# Patient Record
Sex: FEMALE | Race: WHITE | Employment: UNEMPLOYED | URBAN - METROPOLITAN AREA
[De-identification: names, ages, dates, MRNs, and addresses within clinical notes are randomized per-mention and may not be internally consistent; named-entity substitution may affect disease eponyms.]

---

## 2017-01-31 PROBLEM — G89.29 CHRONIC PAIN: Chronic | Status: ACTIVE | Noted: 2017-01-31

## 2017-01-31 PROBLEM — M48.061 NEURAL FORAMINAL STENOSIS OF LUMBAR SPINE: Chronic | Status: ACTIVE | Noted: 2017-01-31

## 2017-01-31 PROBLEM — M96.1 LUMBAR POST-LAMINECTOMY SYNDROME: Chronic | Status: ACTIVE | Noted: 2017-01-31

## 2018-02-16 PROBLEM — Z90.89 S/P PARATHYROIDECTOMY: Status: ACTIVE | Noted: 2018-02-16

## 2018-02-16 PROBLEM — N18.30 STAGE 3 CHRONIC KIDNEY DISEASE (HCC): Status: ACTIVE | Noted: 2018-02-16

## 2018-02-16 PROBLEM — G47.00 INSOMNIA: Status: ACTIVE | Noted: 2018-02-16

## 2018-02-16 PROBLEM — E89.2 S/P PARATHYROIDECTOMY (HCC): Status: ACTIVE | Noted: 2018-02-16

## 2018-02-16 PROBLEM — Z98.890 S/P PARATHYROIDECTOMY: Status: ACTIVE | Noted: 2018-02-16

## 2018-04-12 ENCOUNTER — HOSPITAL ENCOUNTER (OUTPATIENT)
Age: 53
Discharge: HOME | End: 2018-04-14
Payer: COMMERCIAL

## 2018-04-12 PROCEDURE — G0480 DRUG TEST DEF 1-7 CLASSES: HCPCS

## 2018-04-12 PROCEDURE — 80307 DRUG TEST PRSMV CHEM ANLYZR: CPT

## 2018-04-13 LAB
AMPHETAMINE SCREEN, URINE: NOT DETECTED
BARBITURATE SCREEN URINE: NOT DETECTED
BENZODIAZEPINE SCREEN, URINE: NOT DETECTED
CANNABINOID SCREEN URINE: NOT DETECTED
COCAINE METABOLITE SCREEN URINE: NOT DETECTED
METHADONE SCREEN, URINE: NOT DETECTED
OPIATE SCREEN URINE: NOT DETECTED
PHENCYCLIDINE SCREEN URINE: NOT DETECTED
PROPOXYPHENE SCREEN: NOT DETECTED

## 2018-04-16 LAB
6AM URINE: <10 NG/ML
CODEINE, URINE: <20 NG/ML
HYDROCODONE, URINE: <20 NG/ML
HYDROMORPHONE, URINE: <20 NG/ML
MORPHINE URINE: <20 NG/ML
NORHYDROCODONE, URINE: <20 NG/ML
NOROXYCODONE, URINE: <20 NG/ML
NOROXYMORPHONE, URINE: <20 NG/ML
O-DESMETHYLTRAMADOL,UR: POSITIVE
OXYCODONE, URINE CONFIRMATION: <20 NG/ML
OXYMORPHONE, URINE: <20 NG/ML
TRAMADOL, URINE: >5000 NG/ML

## 2018-04-30 ENCOUNTER — HOSPITAL ENCOUNTER (OUTPATIENT)
Age: 53
Discharge: HOME | End: 2018-04-30
Payer: COMMERCIAL

## 2018-04-30 LAB
CALCIUM IONIZED: 1.32 MMOL/L (ref 1.15–1.33)
CALCIUM SERPL-MCNC: 9.5 MG/DL (ref 8.6–10.2)
PARATHYROID HORMONE INTACT: 60 PG/ML (ref 15–65)

## 2018-04-30 PROCEDURE — 36415 COLL VENOUS BLD VENIPUNCTURE: CPT

## 2018-04-30 PROCEDURE — 82330 ASSAY OF CALCIUM: CPT

## 2018-04-30 PROCEDURE — 83970 ASSAY OF PARATHORMONE: CPT

## 2018-04-30 PROCEDURE — 82310 ASSAY OF CALCIUM: CPT

## 2018-06-19 ENCOUNTER — OFFICE VISIT (OUTPATIENT)
Dept: FAMILY MEDICINE CLINIC | Age: 53
End: 2018-06-19
Payer: COMMERCIAL

## 2018-06-19 ENCOUNTER — HOSPITAL ENCOUNTER (OUTPATIENT)
Dept: GENERAL RADIOLOGY | Age: 53
Discharge: HOME | End: 2018-06-21
Payer: COMMERCIAL

## 2018-06-19 ENCOUNTER — HOSPITAL ENCOUNTER (OUTPATIENT)
Age: 53
Discharge: HOME | End: 2018-06-21
Payer: COMMERCIAL

## 2018-06-19 VITALS
HEIGHT: 61 IN | DIASTOLIC BLOOD PRESSURE: 78 MMHG | HEART RATE: 64 BPM | OXYGEN SATURATION: 99 % | SYSTOLIC BLOOD PRESSURE: 122 MMHG | BODY MASS INDEX: 25.86 KG/M2 | WEIGHT: 137 LBS

## 2018-06-19 DIAGNOSIS — S16.1XXA STRAIN OF CERVICAL PORTION OF LEFT TRAPEZIUS MUSCLE: ICD-10-CM

## 2018-06-19 DIAGNOSIS — S69.92XA INJURY OF LEFT MIDDLE FINGER, INITIAL ENCOUNTER: Primary | ICD-10-CM

## 2018-06-19 PROCEDURE — G8417 CALC BMI ABV UP PARAM F/U: HCPCS | Performed by: FAMILY MEDICINE

## 2018-06-19 PROCEDURE — 73000 X-RAY EXAM OF COLLAR BONE: CPT

## 2018-06-19 PROCEDURE — G8427 DOCREV CUR MEDS BY ELIG CLIN: HCPCS | Performed by: FAMILY MEDICINE

## 2018-06-19 PROCEDURE — 3017F COLORECTAL CA SCREEN DOC REV: CPT | Performed by: FAMILY MEDICINE

## 2018-06-19 PROCEDURE — 99214 OFFICE O/P EST MOD 30 MIN: CPT | Performed by: FAMILY MEDICINE

## 2018-06-19 PROCEDURE — 4004F PT TOBACCO SCREEN RCVD TLK: CPT | Performed by: FAMILY MEDICINE

## 2018-06-19 RX ORDER — TRAMADOL HYDROCHLORIDE 50 MG/1
TABLET ORAL
Refills: 5 | COMMUNITY
Start: 2018-05-23 | End: 2019-02-19 | Stop reason: ALTCHOICE

## 2018-06-19 RX ORDER — TIZANIDINE 4 MG/1
4 TABLET ORAL 3 TIMES DAILY
Qty: 30 TABLET | Refills: 0 | Status: SHIPPED | OUTPATIENT
Start: 2018-06-19 | End: 2018-08-16 | Stop reason: SDUPTHER

## 2018-06-19 RX ORDER — GABAPENTIN 600 MG/1
TABLET ORAL
Refills: 2 | COMMUNITY
Start: 2018-03-19 | End: 2018-09-21 | Stop reason: SDUPTHER

## 2018-06-20 ASSESSMENT — ENCOUNTER SYMPTOMS
SORE THROAT: 0
DIARRHEA: 0
ABDOMINAL PAIN: 0
EYE PAIN: 0
SHORTNESS OF BREATH: 0
CONSTIPATION: 0
BACK PAIN: 1
COUGH: 0
SINUS PRESSURE: 0

## 2018-07-20 ENCOUNTER — TELEPHONE (OUTPATIENT)
Dept: ORTHOPEDIC SURGERY | Age: 53
End: 2018-07-20

## 2018-07-20 DIAGNOSIS — R52 PAIN: Primary | ICD-10-CM

## 2018-07-24 ENCOUNTER — OFFICE VISIT (OUTPATIENT)
Dept: ORTHOPEDIC SURGERY | Age: 53
End: 2018-07-24
Payer: COMMERCIAL

## 2018-07-24 VITALS
DIASTOLIC BLOOD PRESSURE: 78 MMHG | SYSTOLIC BLOOD PRESSURE: 116 MMHG | BODY MASS INDEX: 25.52 KG/M2 | WEIGHT: 130 LBS | HEART RATE: 61 BPM | TEMPERATURE: 98 F | RESPIRATION RATE: 18 BRPM | HEIGHT: 60 IN

## 2018-07-24 DIAGNOSIS — S63.413S: Primary | ICD-10-CM

## 2018-07-24 PROCEDURE — 3017F COLORECTAL CA SCREEN DOC REV: CPT | Performed by: ORTHOPAEDIC SURGERY

## 2018-07-24 PROCEDURE — G8417 CALC BMI ABV UP PARAM F/U: HCPCS | Performed by: ORTHOPAEDIC SURGERY

## 2018-07-24 PROCEDURE — 99202 OFFICE O/P NEW SF 15 MIN: CPT | Performed by: ORTHOPAEDIC SURGERY

## 2018-07-24 PROCEDURE — 4004F PT TOBACCO SCREEN RCVD TLK: CPT | Performed by: ORTHOPAEDIC SURGERY

## 2018-07-24 PROCEDURE — G8427 DOCREV CUR MEDS BY ELIG CLIN: HCPCS | Performed by: ORTHOPAEDIC SURGERY

## 2018-07-24 NOTE — PROGRESS NOTES
Department of Orthopedic Surgery  [facility] Shameka Frances MD  History and Physical      CHIEF COMPLAINT:  Left middle finger injury    HISTORY OF PRESENT ILLNESS:                The patient is a 47 y.o. female who presents with Left middle finger injury. Back in December nearly 7 months ago she reports a windowpane came down on her left middle finger. She had immediate pain and deformity. Self treated this. She felt that it may have been dislocated or fractured and tried to self reduce it. She reports she pulled on it and manipulated it. However she still had persistent pain over the radial aspect of the PIP joint and the left middle finger. She is not had any x-rays. She is not had any other treatments. No splinting or bracing and no therapy. No other injuries reported. She is left-hand dominant. The finger does bother her. She is concerned that things are not improving seven months post injury. Past Medical History:        Diagnosis Date    Chronic back pain     Chronic kidney disease      Past Surgical History:        Procedure Laterality Date    CARPAL TUNNEL RELEASE      two carpal tunnel surgeries    CHOLECYSTECTOMY      GALLBLADDER SURGERY      LUMBAR FUSION  2/1/2013    PLIF  L5 - S1    NOSE SURGERY      four nose surgeries    PARATHYROIDECTOMY Right 02/09/2018    ROTATOR CUFF REPAIR      SPINE SURGERY       Current Medications:   No current facility-administered medications for this visit. Allergies:  Pcn [penicillins]    Social History:   TOBACCO:   reports that she has been smoking Cigarettes. She has been smoking about 0.50 packs per day. She has never used smokeless tobacco.  ETOH:   reports that she does not drink alcohol. DRUGS:   reports that she does not use drugs.   ACTIVITIES OF DAILY LIVING:    OCCUPATION:    Family History:   Family History   Problem Relation Age of Onset    Heart Disease Mother     Arthritis Mother     Diabetes Mother     High Blood Pressure Mother     Kidney Disease Mother     Heart Disease Father     High Blood Pressure Father     Arthritis Father     Stroke Father     Diabetes Sister     Arthritis Maternal Uncle     Diabetes Maternal Uncle     Kidney Disease Maternal Uncle     Arthritis Maternal Grandmother        REVIEW OF SYSTEMS:  CONSTITUTIONAL:  negative  EYES:  negative  HEENT:  negative  RESPIRATORY:  negative  CARDIOVASCULAR:  negative  GASTROINTESTINAL:  negative  GENITOURINARY:  CKD  INTEGUMENT/BREAST:  negative  HEMATOLOGIC/LYMPHATIC:  negative  ALLERGIC/IMMUNOLOGIC:  negative  ENDOCRINE:  negative  MUSCULOSKELETAL:  Back pain  NEUROLOGICAL:  negative  BEHAVIOR/PSYCH:  negative    PHYSICAL EXAM:    VITALS:  /78 (Site: Left Arm, Position: Sitting)   Pulse 61   Temp 98 °F (36.7 °C) (Oral)   Resp 18   Ht 5' (1.524 m)   Wt 130 lb (59 kg)   LMP 04/15/2015   BMI 25.39 kg/m²   CONSTITUTIONAL:  awake, alert, cooperative, no apparent distress, and appears stated age  EYES:  Lids and lashes normal, pupils equal, round and reactive to light, extra ocular muscles intact, sclera clear, conjunctiva normal  ENT:  Normocephalic, without obvious abnormality, atraumatic, sinuses nontender on palpation, external ears without lesions, oral pharynx with moist mucus membranes, tonsils without erythema or exudates, gums normal and good dentition. NECK:  Supple, symmetrical, trachea midline, no adenopathy, thyroid symmetric, not enlarged and no tenderness, skin normal  NEUROLOGIC:  Awake, alert, oriented to name, place and time. Cranial nerves II-XII are grossly intact. Motor is 5 out of 5 bilaterally. Sensory is intact.  gait is normal.  MUSCULOSKELETAL:    Left upper extremity: Nontender about the shoulder elbow and wrist region. Negative Tinel's at cubital and carpal tunnels. Negative tenderness or triggering over the thumb index middle ring or small finger A1 pulleys. Radial, ulnar, median nerves are grossly intact to light touch.  Motor testing 5/5 strength extremity. Middle finger with swelling over the radial aspect of PIP joint. Positive tenderness palpation of the radial collateral ligament. Full MCP PIP and DIP joint extension. Full composite flexion to the middle finger and PIP joints. Radial collateral ligament stress testing stable in both full extension and 35° of flexion. No instability appreciated. FDP, FDS, and extensor function is intact. Brisk capillary refill to the digit. DATA:    CBC:   Lab Results   Component Value Date    WBC 5.8 01/17/2018    RBC 5.19 01/17/2018    HGB 13.8 01/17/2018    HCT 43.1 01/17/2018    MCV 83.0 01/17/2018    MCH 26.6 01/17/2018    MCHC 32.0 01/17/2018    RDW 13.8 01/17/2018     01/17/2018    MPV 13.6 01/17/2018     PT/INR:    Lab Results   Component Value Date    PROTIME 10.5 04/14/2015    PROTIME 11.9 12/01/2010    INR 1.0 04/14/2015       Radiology Review:  X-rays left hand focused on the middle finger AP, lateral, obliques were obtained today in the office: Negative for any acute fracture dislocations. The middle finger PIP joint is well reduced and symmetric. Soft tissues within normal limits. Impression and in office x-rays:Left hand and middle finger x-rays negative for any acute fractures dislocations    IMPRESSION:  · Left middle finger radial collateral ligament injury high degree sprain without instability seven months post injury    PLAN:  Today's findings were explained to the patient. X-rays were reviewed with her. Options were explained. She reports the finger is bothersome to her. I recommended giving it more time. If symptoms persist discussed surgical intervention to explore the joint with debridement and radial collateral ligament repair as needed. She reports her symptoms are not severe enough to proceed with surgery at this time.  She may follow up at her discretion

## 2018-08-14 ENCOUNTER — HOSPITAL ENCOUNTER (OUTPATIENT)
Age: 53
Discharge: HOME | End: 2018-08-14
Payer: COMMERCIAL

## 2018-08-14 LAB
ALBUMIN SERPL-MCNC: 4.3 G/DL (ref 3.5–5.2)
ALP BLD-CCNC: 59 U/L (ref 35–104)
ALT SERPL-CCNC: 10 U/L (ref 0–32)
ANION GAP SERPL CALCULATED.3IONS-SCNC: 10 MMOL/L (ref 7–16)
AST SERPL-CCNC: 14 U/L (ref 0–31)
BASOPHILS ABSOLUTE: 0.07 E9/L (ref 0–0.2)
BASOPHILS RELATIVE PERCENT: 1.3 % (ref 0–2)
BILIRUB SERPL-MCNC: 0.7 MG/DL (ref 0–1.2)
BILIRUBIN URINE: NEGATIVE
BLOOD, URINE: NEGATIVE
BUN BLDV-MCNC: 17 MG/DL (ref 6–20)
CALCIUM SERPL-MCNC: 9.4 MG/DL (ref 8.6–10.2)
CHLORIDE BLD-SCNC: 106 MMOL/L (ref 98–107)
CLARITY: CLEAR
CO2: 27 MMOL/L (ref 22–29)
COLOR: YELLOW
CREAT SERPL-MCNC: 1.3 MG/DL (ref 0.5–1)
EOSINOPHILS ABSOLUTE: 0.39 E9/L (ref 0.05–0.5)
EOSINOPHILS RELATIVE PERCENT: 7.1 % (ref 0–6)
GFR AFRICAN AMERICAN: 52
GFR NON-AFRICAN AMERICAN: 43 ML/MIN/1.73
GLUCOSE BLD-MCNC: 107 MG/DL (ref 74–109)
GLUCOSE URINE: NEGATIVE MG/DL
HCT VFR BLD CALC: 42.8 % (ref 34–48)
HEMOGLOBIN: 14.5 G/DL (ref 11.5–15.5)
IMMATURE GRANULOCYTES #: 0.01 E9/L
IMMATURE GRANULOCYTES %: 0.2 % (ref 0–5)
KETONES, URINE: NEGATIVE MG/DL
LEUKOCYTE ESTERASE, URINE: NEGATIVE
LYMPHOCYTES ABSOLUTE: 1.69 E9/L (ref 1.5–4)
LYMPHOCYTES RELATIVE PERCENT: 30.8 % (ref 20–42)
MCH RBC QN AUTO: 28.4 PG (ref 26–35)
MCHC RBC AUTO-ENTMCNC: 33.9 % (ref 32–34.5)
MCV RBC AUTO: 83.9 FL (ref 80–99.9)
MONOCYTES ABSOLUTE: 0.41 E9/L (ref 0.1–0.95)
MONOCYTES RELATIVE PERCENT: 7.5 % (ref 2–12)
NEUTROPHILS ABSOLUTE: 2.92 E9/L (ref 1.8–7.3)
NEUTROPHILS RELATIVE PERCENT: 53.1 % (ref 43–80)
NITRITE, URINE: NEGATIVE
PDW BLD-RTO: 12.7 FL (ref 11.5–15)
PH UA: 6 (ref 5–9)
PLATELET # BLD: 143 E9/L (ref 130–450)
PMV BLD AUTO: 11.8 FL (ref 7–12)
POTASSIUM SERPL-SCNC: 4 MMOL/L (ref 3.5–5)
PROTEIN PROTEIN: 7 MG/DL (ref 0–12)
PROTEIN UA: NEGATIVE MG/DL
RBC # BLD: 5.1 E12/L (ref 3.5–5.5)
SODIUM BLD-SCNC: 143 MMOL/L (ref 132–146)
SPECIFIC GRAVITY UA: 1.02 (ref 1–1.03)
TOTAL PROTEIN: 7.1 G/DL (ref 6.4–8.3)
UROBILINOGEN, URINE: 0.2 E.U./DL
WBC # BLD: 5.5 E9/L (ref 4.5–11.5)

## 2018-08-14 PROCEDURE — 80053 COMPREHEN METABOLIC PANEL: CPT

## 2018-08-14 PROCEDURE — 82570 ASSAY OF URINE CREATININE: CPT

## 2018-08-14 PROCEDURE — 85025 COMPLETE CBC W/AUTO DIFF WBC: CPT

## 2018-08-14 PROCEDURE — 36415 COLL VENOUS BLD VENIPUNCTURE: CPT

## 2018-08-14 PROCEDURE — 84156 ASSAY OF PROTEIN URINE: CPT

## 2018-08-14 PROCEDURE — 81003 URINALYSIS AUTO W/O SCOPE: CPT

## 2018-08-14 PROCEDURE — 82044 UR ALBUMIN SEMIQUANTITATIVE: CPT

## 2018-08-15 LAB
CREATININE URINE: 135 MG/DL (ref 29–226)
MICROALBUMIN UR-MCNC: <12 MG/L
MICROALBUMIN/CREAT UR-RTO: ABNORMAL (ref 0–30)

## 2018-08-16 ENCOUNTER — OFFICE VISIT (OUTPATIENT)
Dept: FAMILY MEDICINE CLINIC | Age: 53
End: 2018-08-16
Payer: COMMERCIAL

## 2018-08-16 ENCOUNTER — HOSPITAL ENCOUNTER (OUTPATIENT)
Age: 53
Discharge: HOME | End: 2018-08-18
Payer: COMMERCIAL

## 2018-08-16 VITALS
OXYGEN SATURATION: 99 % | WEIGHT: 137 LBS | HEIGHT: 61 IN | RESPIRATION RATE: 18 BRPM | DIASTOLIC BLOOD PRESSURE: 79 MMHG | BODY MASS INDEX: 25.86 KG/M2 | SYSTOLIC BLOOD PRESSURE: 113 MMHG | HEART RATE: 61 BPM

## 2018-08-16 DIAGNOSIS — M48.061 NEURAL FORAMINAL STENOSIS OF LUMBAR SPINE: Chronic | ICD-10-CM

## 2018-08-16 DIAGNOSIS — S16.1XXA STRAIN OF CERVICAL PORTION OF LEFT TRAPEZIUS MUSCLE: Primary | ICD-10-CM

## 2018-08-16 DIAGNOSIS — M51.36 BULGING LUMBAR DISC: Chronic | ICD-10-CM

## 2018-08-16 DIAGNOSIS — M96.1 LUMBAR POST-LAMINECTOMY SYNDROME: Chronic | ICD-10-CM

## 2018-08-16 DIAGNOSIS — Z12.31 SCREENING MAMMOGRAM, ENCOUNTER FOR: ICD-10-CM

## 2018-08-16 DIAGNOSIS — Z12.11 SCREENING FOR COLON CANCER: ICD-10-CM

## 2018-08-16 DIAGNOSIS — E78.2 MIXED HYPERLIPIDEMIA: ICD-10-CM

## 2018-08-16 LAB
CHOLESTEROL, TOTAL: 204 MG/DL (ref 0–199)
HDLC SERPL-MCNC: 45 MG/DL
LDL CHOLESTEROL CALCULATED: 136 MG/DL (ref 0–99)
TRIGL SERPL-MCNC: 113 MG/DL (ref 0–149)
VLDLC SERPL CALC-MCNC: 23 MG/DL

## 2018-08-16 PROCEDURE — 4004F PT TOBACCO SCREEN RCVD TLK: CPT | Performed by: FAMILY MEDICINE

## 2018-08-16 PROCEDURE — G8417 CALC BMI ABV UP PARAM F/U: HCPCS | Performed by: FAMILY MEDICINE

## 2018-08-16 PROCEDURE — G8427 DOCREV CUR MEDS BY ELIG CLIN: HCPCS | Performed by: FAMILY MEDICINE

## 2018-08-16 PROCEDURE — 99214 OFFICE O/P EST MOD 30 MIN: CPT | Performed by: FAMILY MEDICINE

## 2018-08-16 PROCEDURE — 80061 LIPID PANEL: CPT

## 2018-08-16 PROCEDURE — 3017F COLORECTAL CA SCREEN DOC REV: CPT | Performed by: FAMILY MEDICINE

## 2018-08-16 RX ORDER — TIZANIDINE 4 MG/1
4 TABLET ORAL 3 TIMES DAILY PRN
Qty: 60 TABLET | Refills: 0 | Status: SHIPPED | OUTPATIENT
Start: 2018-08-16 | End: 2018-10-09 | Stop reason: ALTCHOICE

## 2018-08-16 RX ORDER — OXCARBAZEPINE 150 MG/1
TABLET, FILM COATED ORAL
Qty: 60 TABLET | Refills: 2 | COMMUNITY
Start: 2018-08-16 | End: 2018-10-09 | Stop reason: ALTCHOICE

## 2018-08-16 NOTE — PROGRESS NOTES
Olivia Salcedo  : 1965    Chief Complaint:     Chief Complaint   Patient presents with    Annual Exam       HPI  Olivia Salcedo 47 y.o. presents for   Chief Complaint   Patient presents with    Annual Exam     sprain trapezius/bulging lumbar disc/lumbar postlaminectomy/stenosis of lumbar spine  Patient follows with a different specialist for her pain. She does follow with Dr. Acacia Goldberg who does give her tramadol. However she also follows with a pain specialist located in [place]. She is on multiple different medications for her pain. I did advise her that I think it would be best if she got all her medications from one person. She does wish to eliminate some of her medications and consolidate. She continues to complain of pain though this is somewhat controlled with her current therapy. Lipidemia  She is not on medication currently. She does watch her diet. Her risk score is listed as below. The 10-year ASCVD risk score (Jose Jaime, et al., 2013) is: 3.8%    Values used to calculate the score:      Age: 47 years      Sex: Female      Is Non- : No      Diabetic: No      Tobacco smoker: Yes      Systolic Blood Pressure: 810 mmHg      Is BP treated: No      HDL Cholesterol: 45 mg/dL      Total Cholesterol: 204 mg/dL      All questions were answered to patients satisfaction.     Past Medical History:   Diagnosis Date    Chronic back pain     Chronic kidney disease        Past Surgical History:   Procedure Laterality Date    CARPAL TUNNEL RELEASE      two carpal tunnel surgeries    CHOLECYSTECTOMY      GALLBLADDER SURGERY      LUMBAR FUSION  2013    PLIF  L5 - S1    NOSE SURGERY      four nose surgeries    PARATHYROIDECTOMY Right 2018    ROTATOR CUFF REPAIR      SPINE SURGERY         Social History     Social History    Marital status:      Spouse name: N/A    Number of children: N/A    Years of education: N/A     Social History Main Topics diarrhea. Genitourinary: Negative for dysuria and urgency. Musculoskeletal: Positive for back pain. Negative for myalgias. Finger issue, left shoulder pain   Skin: Negative for rash. Allergic/Immunologic: Negative for food allergies. Neurological: Negative for light-headedness and headaches. Hematological: Does not bruise/bleed easily. Psychiatric/Behavioral: Negative for behavioral problems and sleep disturbance. PHYSICAL EXAM  /79 (Site: Right Arm)   Pulse 61   Resp 18   Ht 5' 1\" (1.549 m)   Wt 137 lb (62.1 kg)   LMP 04/15/2015   SpO2 99%   BMI 25.89 kg/m²   Physical Exam   Constitutional: She is oriented to person, place, and time. She appears well-developed and well-nourished. HENT:   Head: Normocephalic and atraumatic. Right Ear: External ear normal.   Left Ear: External ear normal.   Nose: Nose normal.   Mouth/Throat: Oropharynx is clear and moist.   Eyes: Pupils are equal, round, and reactive to light. Conjunctivae and EOM are normal.   Neck: Normal range of motion. Neck supple. No thyromegaly present. Cardiovascular: Normal rate, regular rhythm and normal heart sounds. Exam reveals no gallop and no friction rub. No murmur heard. Pulmonary/Chest: Effort normal and breath sounds normal. She has no wheezes. Abdominal: Soft. She exhibits no mass. There is no tenderness. There is no rebound and no guarding. Musculoskeletal: Normal range of motion. She exhibits no edema or tenderness. Lymphadenopathy:     She has no cervical adenopathy. Neurological: She is alert and oriented to person, place, and time. She has normal reflexes. Skin: Skin is warm and dry. No rash noted. Incision on anterior neck is clean dry and intact and slightly elevated. Hematoma present. Psychiatric: She has a normal mood and affect. Her behavior is normal.   Nursing note and vitals reviewed. Laboratory:   All laboratory and radiology results have been personally reviewed reviewed today and counseled as appropriate    Call or go to ED immediately if symptoms worsen or persist.  Future Appointments  Date Time Provider Jennifer Cyn   10/9/2018 10:00 AM MD LIOR Hernandez   2/19/2019 10:00 AM DO Rosas Matson [facility]     Or sooner if necessary. Educational materials and/or home exercises printed for patient's review and were included in patient instructions on his/her After Visit Summary and given to patient at the end of visit.       Counseled regarding above diagnosis, including possible risks and complications,  especially if left uncontrolled.     Counseled regarding the possible side effects, risks, benefits and alternatives to treatment; patient and/or guardian verbalizes understanding, agrees, feels comfortable with and wishes to proceed with above treatment plan.     Advised patient to call with any new medication issues, and read all Rx info from pharmacy to assure aware of all possible risks and side effects of medication before taking.     Reviewed age and gender appropriate health screening exams and vaccinations. Advised patient regarding importance of keeping up with recommended health maintenance and to schedule as soon as possible if overdue, as this is important in assessing for undiagnosed pathology, especially cancer, as well as protecting against potentially harmful/life threatening disease.          Patient and/or guardian verbalizes understanding and agrees with above counseling, assessment and plan.     All questions answered. Farooq Valenzuela, [address], DO  8/16/18    NOTE: This report was transcribed using voice recognition software.  Every effort was made to ensure accuracy; however, inadvertent computerized transcription errors may be present

## 2018-08-17 ASSESSMENT — ENCOUNTER SYMPTOMS
SORE THROAT: 0
COUGH: 0
EYE PAIN: 0
SHORTNESS OF BREATH: 0
BACK PAIN: 1
DIARRHEA: 0
ABDOMINAL PAIN: 0
SINUS PRESSURE: 0
CONSTIPATION: 0

## 2018-08-30 ENCOUNTER — HOSPITAL ENCOUNTER (OUTPATIENT)
Dept: GENERAL RADIOLOGY | Age: 53
Discharge: HOME | End: 2018-09-01
Payer: COMMERCIAL

## 2018-08-30 DIAGNOSIS — Z12.31 SCREENING MAMMOGRAM, ENCOUNTER FOR: ICD-10-CM

## 2018-08-30 PROCEDURE — 77067 SCR MAMMO BI INCL CAD: CPT

## 2019-02-19 ENCOUNTER — OFFICE VISIT (OUTPATIENT)
Dept: FAMILY MEDICINE CLINIC | Age: 54
End: 2019-02-19
Payer: COMMERCIAL

## 2019-02-19 VITALS
HEIGHT: 60 IN | DIASTOLIC BLOOD PRESSURE: 70 MMHG | BODY MASS INDEX: 28.27 KG/M2 | TEMPERATURE: 97.4 F | OXYGEN SATURATION: 98 % | HEART RATE: 68 BPM | SYSTOLIC BLOOD PRESSURE: 110 MMHG | WEIGHT: 144 LBS

## 2019-02-19 DIAGNOSIS — Z12.11 SCREENING FOR COLON CANCER: ICD-10-CM

## 2019-02-19 DIAGNOSIS — G47.00 INSOMNIA, UNSPECIFIED TYPE: ICD-10-CM

## 2019-02-19 DIAGNOSIS — E78.5 HYPERLIPIDEMIA, UNSPECIFIED HYPERLIPIDEMIA TYPE: ICD-10-CM

## 2019-02-19 DIAGNOSIS — M96.1 LUMBAR POST-LAMINECTOMY SYNDROME: Chronic | ICD-10-CM

## 2019-02-19 DIAGNOSIS — N18.30 STAGE 3 CHRONIC KIDNEY DISEASE (HCC): Primary | ICD-10-CM

## 2019-02-19 PROCEDURE — G8427 DOCREV CUR MEDS BY ELIG CLIN: HCPCS | Performed by: FAMILY MEDICINE

## 2019-02-19 PROCEDURE — 3017F COLORECTAL CA SCREEN DOC REV: CPT | Performed by: FAMILY MEDICINE

## 2019-02-19 PROCEDURE — 4004F PT TOBACCO SCREEN RCVD TLK: CPT | Performed by: FAMILY MEDICINE

## 2019-02-19 PROCEDURE — 99214 OFFICE O/P EST MOD 30 MIN: CPT | Performed by: FAMILY MEDICINE

## 2019-02-19 PROCEDURE — G8484 FLU IMMUNIZE NO ADMIN: HCPCS | Performed by: FAMILY MEDICINE

## 2019-02-19 PROCEDURE — G8417 CALC BMI ABV UP PARAM F/U: HCPCS | Performed by: FAMILY MEDICINE

## 2019-02-19 RX ORDER — METHADONE HYDROCHLORIDE 5 MG/1
TABLET ORAL
Refills: 0 | COMMUNITY
Start: 2018-11-30 | End: 2020-01-30 | Stop reason: ALTCHOICE

## 2019-02-19 RX ORDER — TRAZODONE HYDROCHLORIDE 150 MG/1
TABLET ORAL
Qty: 90 TABLET | Refills: 1 | Status: SHIPPED | OUTPATIENT
Start: 2019-02-19 | End: 2019-08-14 | Stop reason: SDUPTHER

## 2019-02-19 RX ORDER — GABAPENTIN 600 MG/1
TABLET ORAL
Qty: 360 TABLET | Refills: 1 | Status: SHIPPED | OUTPATIENT
Start: 2019-02-19 | End: 2019-08-14 | Stop reason: SDUPTHER

## 2019-02-19 ASSESSMENT — ENCOUNTER SYMPTOMS
DIARRHEA: 0
EYE PAIN: 0
COUGH: 0
SINUS PRESSURE: 0
SHORTNESS OF BREATH: 0
SORE THROAT: 0
CONSTIPATION: 0
ABDOMINAL PAIN: 0
BACK PAIN: 0

## 2019-02-19 ASSESSMENT — PATIENT HEALTH QUESTIONNAIRE - PHQ9
1. LITTLE INTEREST OR PLEASURE IN DOING THINGS: 0
SUM OF ALL RESPONSES TO PHQ9 QUESTIONS 1 & 2: 0
2. FEELING DOWN, DEPRESSED OR HOPELESS: 0
SUM OF ALL RESPONSES TO PHQ QUESTIONS 1-9: 0
SUM OF ALL RESPONSES TO PHQ QUESTIONS 1-9: 0

## 2019-03-05 ENCOUNTER — TELEPHONE (OUTPATIENT)
Dept: FAMILY MEDICINE CLINIC | Age: 54
End: 2019-03-05

## 2019-03-05 DIAGNOSIS — Z12.11 SCREENING FOR COLON CANCER: ICD-10-CM

## 2019-03-05 LAB
CONTROL: POSITIVE
HEMOCCULT STL QL: NEGATIVE

## 2019-03-05 PROCEDURE — 82274 ASSAY TEST FOR BLOOD FECAL: CPT | Performed by: FAMILY MEDICINE

## 2019-06-12 ENCOUNTER — HOSPITAL ENCOUNTER (OUTPATIENT)
Age: 54
Discharge: HOME | End: 2019-06-12
Payer: COMMERCIAL

## 2019-06-12 LAB
ALBUMIN SERPL-MCNC: 4.5 G/DL (ref 3.5–5.2)
ALP BLD-CCNC: 65 U/L (ref 35–104)
ALT SERPL-CCNC: 9 U/L (ref 0–32)
ANION GAP SERPL CALCULATED.3IONS-SCNC: 9 MMOL/L (ref 7–16)
AST SERPL-CCNC: 16 U/L (ref 0–31)
BILIRUB SERPL-MCNC: 0.6 MG/DL (ref 0–1.2)
BUN BLDV-MCNC: 15 MG/DL (ref 6–20)
CALCIUM SERPL-MCNC: 9.7 MG/DL (ref 8.6–10.2)
CHLORIDE BLD-SCNC: 105 MMOL/L (ref 98–107)
CO2: 28 MMOL/L (ref 22–29)
CREAT SERPL-MCNC: 1.3 MG/DL (ref 0.5–1)
GFR AFRICAN AMERICAN: 52
GFR NON-AFRICAN AMERICAN: 43 ML/MIN/1.73
GLUCOSE FASTING: 101 MG/DL (ref 74–99)
POTASSIUM SERPL-SCNC: 4.2 MMOL/L (ref 3.5–5)
SODIUM BLD-SCNC: 142 MMOL/L (ref 132–146)
TOTAL PROTEIN: 7.3 G/DL (ref 6.4–8.3)

## 2019-06-12 PROCEDURE — 80053 COMPREHEN METABOLIC PANEL: CPT

## 2019-06-12 PROCEDURE — 36415 COLL VENOUS BLD VENIPUNCTURE: CPT

## 2019-08-14 ENCOUNTER — OFFICE VISIT (OUTPATIENT)
Dept: FAMILY MEDICINE CLINIC | Age: 54
End: 2019-08-14
Payer: COMMERCIAL

## 2019-08-14 VITALS
BODY MASS INDEX: 27.41 KG/M2 | HEART RATE: 60 BPM | DIASTOLIC BLOOD PRESSURE: 71 MMHG | OXYGEN SATURATION: 98 % | SYSTOLIC BLOOD PRESSURE: 113 MMHG | RESPIRATION RATE: 18 BRPM | HEIGHT: 60 IN | TEMPERATURE: 98.3 F | WEIGHT: 139.6 LBS

## 2019-08-14 DIAGNOSIS — E78.5 HYPERLIPIDEMIA, UNSPECIFIED HYPERLIPIDEMIA TYPE: ICD-10-CM

## 2019-08-14 DIAGNOSIS — Z12.31 SCREENING MAMMOGRAM, ENCOUNTER FOR: ICD-10-CM

## 2019-08-14 DIAGNOSIS — M48.061 NEURAL FORAMINAL STENOSIS OF LUMBAR SPINE: ICD-10-CM

## 2019-08-14 DIAGNOSIS — N18.30 STAGE 3 CHRONIC KIDNEY DISEASE (HCC): Primary | ICD-10-CM

## 2019-08-14 DIAGNOSIS — G47.00 INSOMNIA, UNSPECIFIED TYPE: ICD-10-CM

## 2019-08-14 PROCEDURE — G8427 DOCREV CUR MEDS BY ELIG CLIN: HCPCS | Performed by: FAMILY MEDICINE

## 2019-08-14 PROCEDURE — 4004F PT TOBACCO SCREEN RCVD TLK: CPT | Performed by: FAMILY MEDICINE

## 2019-08-14 PROCEDURE — G8417 CALC BMI ABV UP PARAM F/U: HCPCS | Performed by: FAMILY MEDICINE

## 2019-08-14 PROCEDURE — 3017F COLORECTAL CA SCREEN DOC REV: CPT | Performed by: FAMILY MEDICINE

## 2019-08-14 PROCEDURE — 99214 OFFICE O/P EST MOD 30 MIN: CPT | Performed by: FAMILY MEDICINE

## 2019-08-14 RX ORDER — TRAZODONE HYDROCHLORIDE 150 MG/1
TABLET ORAL
Qty: 90 TABLET | Refills: 1 | Status: SHIPPED
Start: 2019-08-14 | End: 2020-02-14 | Stop reason: SDUPTHER

## 2019-08-14 RX ORDER — GABAPENTIN 600 MG/1
TABLET ORAL
Qty: 180 TABLET | Refills: 1 | Status: SHIPPED
Start: 2019-08-14 | End: 2020-02-14 | Stop reason: SDUPTHER

## 2019-08-14 ASSESSMENT — ENCOUNTER SYMPTOMS
DIARRHEA: 0
SORE THROAT: 0
SHORTNESS OF BREATH: 0
COUGH: 0
SINUS PRESSURE: 0
EYE PAIN: 0
CONSTIPATION: 0
ABDOMINAL PAIN: 0
BACK PAIN: 0

## 2019-08-14 NOTE — PROGRESS NOTES
DO Rosas Frances [facility]     Or sooner if necessary. Educational materials and/or homeexercises printed for patient's review and were included in patient instructions on his/her After Visit Summary and given to patient at the end of visit.       Counseled regarding above diagnosis, including possible risks and complications,  especially if left uncontrolled.     Counseled regarding the possible side effects, risks, benefits and alternatives to treatment; patient and/or guardian verbalizes understanding, agrees,feels comfortable with and wishes to proceed with above treatment plan.     Advised patient to call [facility] new medication issues, and read all Rx info from pharmacy to assure aware of all possible risks and side effects of medication before taking.     Reviewed age and gender appropriate health screening exams and vaccinations. Advised patient regarding importance of keeping up with recommended health maintenance and toschedule as soon as possible if overdue, as this is important in assessing for undiagnosed pathology, especially cancer, as well as protecting against potentially harmful/life threatening disease.          Patient and/or guardian verbalizes understanding and agrees with above counseling, assessment and plan.     All questions answered. Yamini Villasenor DO  8/14/19    NOTE: This report was transcribed using voice recognition software.  Every effort was made to ensure accuracy; however, inadvertent computerized transcription errors may be present

## 2019-09-30 ENCOUNTER — HOSPITAL ENCOUNTER (OUTPATIENT)
Age: 54
Discharge: HOME | End: 2019-09-30
Payer: COMMERCIAL

## 2019-09-30 LAB
ANION GAP SERPL CALCULATED.3IONS-SCNC: 8 MMOL/L (ref 7–16)
BACTERIA: ABNORMAL /HPF
BILIRUBIN URINE: NEGATIVE
BLOOD, URINE: NEGATIVE
BUN BLDV-MCNC: 18 MG/DL (ref 6–20)
CALCIUM SERPL-MCNC: 9.5 MG/DL (ref 8.6–10.2)
CHLORIDE BLD-SCNC: 107 MMOL/L (ref 98–107)
CLARITY: CLEAR
CO2: 29 MMOL/L (ref 22–29)
COLOR: YELLOW
CREAT SERPL-MCNC: 1.3 MG/DL (ref 0.5–1)
CREATININE URINE: 115 MG/DL (ref 29–226)
EPITHELIAL CELLS, UA: ABNORMAL /HPF
GFR AFRICAN AMERICAN: 52
GFR NON-AFRICAN AMERICAN: 43 ML/MIN/1.73
GLUCOSE BLD-MCNC: 79 MG/DL (ref 74–99)
GLUCOSE URINE: NEGATIVE MG/DL
KETONES, URINE: NEGATIVE MG/DL
LEUKOCYTE ESTERASE, URINE: ABNORMAL
MICROALBUMIN UR-MCNC: <12 MG/L
MICROALBUMIN/CREAT UR-RTO: ABNORMAL (ref 0–30)
NITRITE, URINE: NEGATIVE
PH UA: 5.5 (ref 5–9)
POTASSIUM SERPL-SCNC: 4.2 MMOL/L (ref 3.5–5)
PROTEIN PROTEIN: 6 MG/DL (ref 0–12)
PROTEIN UA: NEGATIVE MG/DL
RBC UA: ABNORMAL /HPF (ref 0–2)
SODIUM BLD-SCNC: 144 MMOL/L (ref 132–146)
SPECIFIC GRAVITY UA: 1.02 (ref 1–1.03)
UROBILINOGEN, URINE: 0.2 E.U./DL
WBC UA: ABNORMAL /HPF (ref 0–5)

## 2019-09-30 PROCEDURE — 81001 URINALYSIS AUTO W/SCOPE: CPT

## 2019-09-30 PROCEDURE — 82570 ASSAY OF URINE CREATININE: CPT

## 2019-09-30 PROCEDURE — 36415 COLL VENOUS BLD VENIPUNCTURE: CPT

## 2019-09-30 PROCEDURE — 84156 ASSAY OF PROTEIN URINE: CPT

## 2019-09-30 PROCEDURE — 80048 BASIC METABOLIC PNL TOTAL CA: CPT

## 2019-09-30 PROCEDURE — 82044 UR ALBUMIN SEMIQUANTITATIVE: CPT

## 2019-11-14 ENCOUNTER — OFFICE VISIT (OUTPATIENT)
Dept: FAMILY MEDICINE CLINIC | Age: 54
End: 2019-11-14
Payer: COMMERCIAL

## 2019-11-14 VITALS
RESPIRATION RATE: 20 BRPM | DIASTOLIC BLOOD PRESSURE: 80 MMHG | HEART RATE: 79 BPM | BODY MASS INDEX: 27.29 KG/M2 | WEIGHT: 139 LBS | TEMPERATURE: 98.4 F | HEIGHT: 60 IN | SYSTOLIC BLOOD PRESSURE: 114 MMHG

## 2019-11-14 DIAGNOSIS — M25.511 CHRONIC RIGHT SHOULDER PAIN: Primary | ICD-10-CM

## 2019-11-14 DIAGNOSIS — G89.29 CHRONIC RIGHT SHOULDER PAIN: Primary | ICD-10-CM

## 2019-11-14 PROCEDURE — G8417 CALC BMI ABV UP PARAM F/U: HCPCS | Performed by: FAMILY MEDICINE

## 2019-11-14 PROCEDURE — 4004F PT TOBACCO SCREEN RCVD TLK: CPT | Performed by: FAMILY MEDICINE

## 2019-11-14 PROCEDURE — G8427 DOCREV CUR MEDS BY ELIG CLIN: HCPCS | Performed by: FAMILY MEDICINE

## 2019-11-14 PROCEDURE — G8484 FLU IMMUNIZE NO ADMIN: HCPCS | Performed by: FAMILY MEDICINE

## 2019-11-14 PROCEDURE — 3017F COLORECTAL CA SCREEN DOC REV: CPT | Performed by: FAMILY MEDICINE

## 2019-11-14 PROCEDURE — 99213 OFFICE O/P EST LOW 20 MIN: CPT | Performed by: FAMILY MEDICINE

## 2019-11-15 ENCOUNTER — HOSPITAL ENCOUNTER (OUTPATIENT)
Age: 54
Discharge: HOME | End: 2019-11-17
Payer: COMMERCIAL

## 2019-11-15 ENCOUNTER — HOSPITAL ENCOUNTER (OUTPATIENT)
Dept: GENERAL RADIOLOGY | Age: 54
Discharge: HOME | End: 2019-11-17
Payer: COMMERCIAL

## 2019-11-15 DIAGNOSIS — G89.29 CHRONIC RIGHT SHOULDER PAIN: ICD-10-CM

## 2019-11-15 DIAGNOSIS — M25.511 CHRONIC RIGHT SHOULDER PAIN: ICD-10-CM

## 2019-11-15 PROCEDURE — 73030 X-RAY EXAM OF SHOULDER: CPT

## 2019-11-15 ASSESSMENT — ENCOUNTER SYMPTOMS
ABDOMINAL PAIN: 0
SINUS PRESSURE: 0
CONSTIPATION: 0
SORE THROAT: 0
SHORTNESS OF BREATH: 0
COUGH: 0
EYE PAIN: 0
DIARRHEA: 0
BACK PAIN: 0

## 2019-11-27 ENCOUNTER — OFFICE VISIT (OUTPATIENT)
Dept: FAMILY MEDICINE CLINIC | Age: 54
End: 2019-11-27
Payer: COMMERCIAL

## 2019-11-27 VITALS
DIASTOLIC BLOOD PRESSURE: 83 MMHG | HEART RATE: 70 BPM | BODY MASS INDEX: 27.34 KG/M2 | SYSTOLIC BLOOD PRESSURE: 129 MMHG | RESPIRATION RATE: 16 BRPM | WEIGHT: 140 LBS | OXYGEN SATURATION: 100 %

## 2019-11-27 DIAGNOSIS — M19.011 OSTEOARTHRITIS OF RIGHT SHOULDER, UNSPECIFIED OSTEOARTHRITIS TYPE: Primary | ICD-10-CM

## 2019-11-27 PROCEDURE — G8484 FLU IMMUNIZE NO ADMIN: HCPCS | Performed by: FAMILY MEDICINE

## 2019-11-27 PROCEDURE — 3017F COLORECTAL CA SCREEN DOC REV: CPT | Performed by: FAMILY MEDICINE

## 2019-11-27 PROCEDURE — G8417 CALC BMI ABV UP PARAM F/U: HCPCS | Performed by: FAMILY MEDICINE

## 2019-11-27 PROCEDURE — G8427 DOCREV CUR MEDS BY ELIG CLIN: HCPCS | Performed by: FAMILY MEDICINE

## 2019-11-27 PROCEDURE — 4004F PT TOBACCO SCREEN RCVD TLK: CPT | Performed by: FAMILY MEDICINE

## 2019-11-27 PROCEDURE — 99213 OFFICE O/P EST LOW 20 MIN: CPT | Performed by: FAMILY MEDICINE

## 2019-12-03 ASSESSMENT — ENCOUNTER SYMPTOMS
DIARRHEA: 0
BACK PAIN: 0
SHORTNESS OF BREATH: 0
COUGH: 0
SORE THROAT: 0
ABDOMINAL PAIN: 0
SINUS PRESSURE: 0
CONSTIPATION: 0
EYE PAIN: 0

## 2020-01-07 ENCOUNTER — OFFICE VISIT (OUTPATIENT)
Dept: ORTHOPEDIC SURGERY | Age: 55
End: 2020-01-07
Payer: COMMERCIAL

## 2020-01-07 VITALS — HEIGHT: 60 IN | WEIGHT: 130 LBS | BODY MASS INDEX: 25.52 KG/M2

## 2020-01-07 PROCEDURE — G8427 DOCREV CUR MEDS BY ELIG CLIN: HCPCS | Performed by: ORTHOPAEDIC SURGERY

## 2020-01-07 PROCEDURE — 4004F PT TOBACCO SCREEN RCVD TLK: CPT | Performed by: ORTHOPAEDIC SURGERY

## 2020-01-07 PROCEDURE — G8417 CALC BMI ABV UP PARAM F/U: HCPCS | Performed by: ORTHOPAEDIC SURGERY

## 2020-01-07 PROCEDURE — 3017F COLORECTAL CA SCREEN DOC REV: CPT | Performed by: ORTHOPAEDIC SURGERY

## 2020-01-07 PROCEDURE — 99213 OFFICE O/P EST LOW 20 MIN: CPT | Performed by: ORTHOPAEDIC SURGERY

## 2020-01-07 PROCEDURE — G8484 FLU IMMUNIZE NO ADMIN: HCPCS | Performed by: ORTHOPAEDIC SURGERY

## 2020-01-07 NOTE — PROGRESS NOTES
 Number of children: Not on file    Years of education: Not on file    Highest education level: Not on file   Occupational History    Not on file   Social Needs    Financial resource strain: Not on file    Food insecurity:     Worry: Not on file     Inability: Not on file    Transportation needs:     Medical: Not on file     Non-medical: Not on file   Tobacco Use    Smoking status: Current Every Day Smoker     Packs/day: 0.50     Years: 10.00     Pack years: 5.00     Types: Cigarettes    Smokeless tobacco: Never Used    Tobacco comment: quit smoking  11/2012   Substance and Sexual Activity    Alcohol use: No    Drug use: No    Sexual activity: Not on file   Lifestyle    Physical activity:     Days per week: Not on file     Minutes per session: Not on file    Stress: Not on file   Relationships    Social connections:     Talks on phone: Not on file     Gets together: Not on file     Attends Holiness service: Not on file     Active member of club or organization: Not on file     Attends meetings of clubs or organizations: Not on file     Relationship status: Not on file    Intimate partner violence:     Fear of current or ex partner: Not on file     Emotionally abused: Not on file     Physically abused: Not on file     Forced sexual activity: Not on file   Other Topics Concern    Not on file   Social History Narrative    Not on file     Family History   Problem Relation Age of Onset    Heart Disease Mother     Arthritis Mother     Diabetes Mother     High Blood Pressure Mother     Kidney Disease Mother     Heart Disease Father     High Blood Pressure Father     Arthritis Father     Stroke Father     Diabetes Sister     Arthritis Maternal Uncle     Diabetes Maternal Uncle     Kidney Disease Maternal Uncle     Arthritis Maternal Grandmother        REVIEW OF SYSTEMS:     General/Constitution:  (-)weight loss, (-)fever, (-)chills, (-)weakness.    Skin: (-) rash,(-) psoriasis,(-) eczema, (-)skin cancer. Musculoskeletal: (-) fractures,  (-) dislocations,(-) collagen vascular disease, (-) fibromyalgia, (-) multiple sclerosis, (-) muscular dystrophy, (-) RSD,(-) joint pain (-)swelling, (-) joint pain,swelling. Neurologic: (-) epilepsy, (-)seizures,(-) brain tumor,(-) TIA, (-)stroke, (-)headaches, (-)Parkinson disease,(-) memory loss, (-) LOC. Cardiovascular: (-) Chest pain, (-) swelling in legs/feet, (-) SOB, (-) cramping in legs/feet with walking. Respiratory: (-) SOB, (-) Coughing, (-) night sweats. GI: (-) nausea, (-) vomiting, (-) diarrhea, (-) blood in stool, (-) gastric ulcer. Psychiatric: (-) Depression, (-) Anxiety, (-) bipolar disease, (-) Alzheimer's Disease  Allergic/Immunologic: (-) allergies latex, (-) allergies metal, (-) skin sensitivity. Hematlogic: (-) anemia, (-) blood transfusion, (-) DVT/PE, (-) Clotting disorders      Subjective:    Constitution:  Ht 5' (1.524 m)   Wt 130 lb (59 kg)   LMP 04/15/2015   BMI 25.39 kg/m²     Psycihatric:  The patient is alert and oriented x 3, appears to be stated age and in no distress. Respiratory:  Respiratory effort is not labored. Patient is not gasping. Palpation of the chest reveals no tactile fremitus. Skin:  Upon inspection: the skin appears warm, dry and intact. There is  a previous scar over the affected area. There is not any cellulitis, lymphedema or cutaneous lesions noted in the lower extremities. Upon palpation there is no induration noted. Neurologic:  Motor exam of the upper extremities show: The reflexes in biceps/triceps/brachioradialis are equal and symmetric. Sensory exam C5-T1 are normal bilaterally. Cardiovascular: The vascular exam is normal and is well perfused to distal extremities. There are 2+ radial pulses bilaterally, and motor and sensation is intact to median, ulnar, and radial, musclocutaneus, and axillary nerve distribution and grossly symmetric bilaterally.   There is cap refill noted less than two seconds in all digits. There is not edema of the bilateral upper extremities. There is not varicosities noted in the distal extremities. Lymph:  Upon palpation,  there is no lymphadenopathy noted in bilateral upper extremities. Musculoskeletal:  Gait: normal; examination of the nails and digits reveal no cyanosis or clubbing. Cervical Exam:  On physical exam, Lily Saldana is well-developed, well-nourished, oriented to person, place and time. her gait is normal.  On evaluation of hercervical spine, She has full range of motion of the cervical spine without pain. There is no cervical tenderness to palpation. Shoulder Exam:  On evaluation of her bilaterally upper extremities, her right shoulder has no deformity. There is tenderness upon palpation of the lateral shoulder. There is not evidence of scapular dyskinesis. There is not muscle atrophy in shoulder girdle. The range of motion for the Right Shoulder is 140/30/t10 and for the Left shoulder is 150/40/t8. Right shoulder Motor strength is 5-/5 in the supraspinatus, 5/5 internal rotation and 5-/5 in external rotation, and Left shoulder motor strength 5/5 in supraspinatus, 5/5 in internal rotation, 5/5 in external rotation. Right shoulder:  positive Impingement , positive Whitley ,negative  Speeds,negative  Apprehension ,negative Dean Load Shift, negative Binta manuver, negative Cross arm test.     Left shoulder:  negative Impingement , negative Whitley ,negative  Speeds,negative  Apprehension ,negative Dean Load Shift, negative Binta manuver, negative Cross arm test.     XRAY:    1. AC joint arthritis and osseous fragment below the clavicle. This is   indeterminate, possibly from a previous injury or loose body. MRI:  n/a    Radiographic findings reviewed with patient    Impression:   Encounter Diagnosis   Name Primary?     Complete tear of right rotator cuff, unspecified whether traumatic Yes

## 2020-01-14 ENCOUNTER — HOSPITAL ENCOUNTER (OUTPATIENT)
Dept: MRI IMAGING | Age: 55
Discharge: HOME | End: 2020-01-16
Payer: COMMERCIAL

## 2020-01-14 PROCEDURE — 73221 MRI JOINT UPR EXTREM W/O DYE: CPT

## 2020-01-30 ENCOUNTER — OFFICE VISIT (OUTPATIENT)
Dept: ORTHOPEDIC SURGERY | Age: 55
End: 2020-01-30
Payer: COMMERCIAL

## 2020-01-30 VITALS — WEIGHT: 135 LBS | HEIGHT: 60 IN | BODY MASS INDEX: 26.5 KG/M2

## 2020-01-30 PROBLEM — M75.42 SHOULDER IMPINGEMENT, LEFT: Status: ACTIVE | Noted: 2020-01-30

## 2020-01-30 PROBLEM — M25.811 SHOULDER IMPINGEMENT, RIGHT: Status: ACTIVE | Noted: 2020-01-30

## 2020-01-30 PROBLEM — M19.011 ARTHRITIS OF RIGHT ACROMIOCLAVICULAR JOINT: Status: ACTIVE | Noted: 2020-01-30

## 2020-01-30 PROBLEM — M75.41 SHOULDER IMPINGEMENT, RIGHT: Status: ACTIVE | Noted: 2020-01-30

## 2020-01-30 PROBLEM — M25.812 SHOULDER IMPINGEMENT, LEFT: Status: ACTIVE | Noted: 2020-01-30

## 2020-01-30 PROCEDURE — G8417 CALC BMI ABV UP PARAM F/U: HCPCS | Performed by: ORTHOPAEDIC SURGERY

## 2020-01-30 PROCEDURE — 99214 OFFICE O/P EST MOD 30 MIN: CPT | Performed by: ORTHOPAEDIC SURGERY

## 2020-01-30 PROCEDURE — 3017F COLORECTAL CA SCREEN DOC REV: CPT | Performed by: ORTHOPAEDIC SURGERY

## 2020-01-30 PROCEDURE — G8427 DOCREV CUR MEDS BY ELIG CLIN: HCPCS | Performed by: ORTHOPAEDIC SURGERY

## 2020-01-30 PROCEDURE — 4004F PT TOBACCO SCREEN RCVD TLK: CPT | Performed by: ORTHOPAEDIC SURGERY

## 2020-01-30 PROCEDURE — G8484 FLU IMMUNIZE NO ADMIN: HCPCS | Performed by: ORTHOPAEDIC SURGERY

## 2020-01-30 NOTE — PROGRESS NOTES
Worry: Not on file     Inability: Not on file    Transportation needs:     Medical: Not on file     Non-medical: Not on file   Tobacco Use    Smoking status: Current Every Day Smoker     Packs/day: 0.50     Years: 10.00     Pack years: 5.00     Types: Cigarettes    Smokeless tobacco: Never Used    Tobacco comment: quit smoking  11/2012   Substance and Sexual Activity    Alcohol use: No    Drug use: No    Sexual activity: Not on file   Lifestyle    Physical activity:     Days per week: Not on file     Minutes per session: Not on file    Stress: Not on file   Relationships    Social connections:     Talks on phone: Not on file     Gets together: Not on file     Attends Am[employer] service: Not on file     Active member of club or organization: Not on file     Attends meetings of clubs or organizations: Not on file     Relationship status: Not on file    Intimate partner violence:     Fear of current or ex partner: Not on file     Emotionally abused: Not on file     Physically abused: Not on file     Forced sexual activity: Not on file   Other Topics Concern    Not on file   Social History Narrative    Not on file     Family History   Problem Relation Age of Onset    Heart Disease Mother     Arthritis Mother     Diabetes Mother     High Blood Pressure Mother     Kidney Disease Mother     Heart Disease Father     High Blood Pressure Father     Arthritis Father     Stroke Father     Diabetes Sister     Arthritis Maternal Uncle     Diabetes Maternal Uncle     Kidney Disease Maternal Uncle     Arthritis Maternal Grandmother        REVIEW OF SYSTEMS:     General/Constitution:  (-)weight loss, (-)fever, (-)chills, (-)weakness. Skin: (-) rash,(-) psoriasis,(-) eczema, (-)skin cancer.    Musculoskeletal: (-) fractures,  (-) dislocations,(-) collagen vascular disease, (-) fibromyalgia, (-) multiple sclerosis, (-) muscular dystrophy, (-) RSD,(-) joint pain (-)swelling, (-) joint

## 2020-02-14 ENCOUNTER — OFFICE VISIT (OUTPATIENT)
Dept: FAMILY MEDICINE CLINIC | Age: 55
End: 2020-02-14
Payer: COMMERCIAL

## 2020-02-14 ENCOUNTER — HOSPITAL ENCOUNTER (OUTPATIENT)
Age: 55
Discharge: HOME | End: 2020-02-16
Payer: COMMERCIAL

## 2020-02-14 ENCOUNTER — TELEPHONE (OUTPATIENT)
Dept: FAMILY MEDICINE CLINIC | Age: 55
End: 2020-02-14

## 2020-02-14 VITALS
HEART RATE: 68 BPM | HEIGHT: 60 IN | DIASTOLIC BLOOD PRESSURE: 78 MMHG | TEMPERATURE: 97.6 F | SYSTOLIC BLOOD PRESSURE: 120 MMHG | WEIGHT: 140 LBS | OXYGEN SATURATION: 98 % | RESPIRATION RATE: 16 BRPM | BODY MASS INDEX: 27.48 KG/M2

## 2020-02-14 PROBLEM — E89.2 POSTPROCEDURAL HYPOPARATHYROIDISM (HCC): Status: ACTIVE | Noted: 2020-02-14

## 2020-02-14 PROBLEM — N18.4 CKD (CHRONIC KIDNEY DISEASE) STAGE 4, GFR 15-29 ML/MIN (HCC): Status: ACTIVE | Noted: 2020-02-14

## 2020-02-14 LAB
ANION GAP SERPL CALCULATED.3IONS-SCNC: 15 MMOL/L (ref 7–16)
BASOPHILS ABSOLUTE: 0.08 E9/L (ref 0–0.2)
BASOPHILS RELATIVE PERCENT: 1.5 % (ref 0–2)
BUN BLDV-MCNC: 16 MG/DL (ref 6–20)
CALCIUM SERPL-MCNC: 9.4 MG/DL (ref 8.6–10.2)
CHLORIDE BLD-SCNC: 103 MMOL/L (ref 98–107)
CHOLESTEROL, TOTAL: 197 MG/DL (ref 0–199)
CO2: 24 MMOL/L (ref 22–29)
CREAT SERPL-MCNC: 1.3 MG/DL (ref 0.5–1)
EOSINOPHILS ABSOLUTE: 0.28 E9/L (ref 0.05–0.5)
EOSINOPHILS RELATIVE PERCENT: 5.4 % (ref 0–6)
GFR AFRICAN AMERICAN: 52
GFR NON-AFRICAN AMERICAN: 43 ML/MIN/1.73
GLUCOSE BLD-MCNC: 111 MG/DL (ref 74–99)
HCT VFR BLD CALC: 47.5 % (ref 34–48)
HDLC SERPL-MCNC: 54 MG/DL
HEMOGLOBIN: 14.9 G/DL (ref 11.5–15.5)
IMMATURE GRANULOCYTES #: 0.01 E9/L
IMMATURE GRANULOCYTES %: 0.2 % (ref 0–5)
LDL CHOLESTEROL CALCULATED: 127 MG/DL (ref 0–99)
LYMPHOCYTES ABSOLUTE: 1.31 E9/L (ref 1.5–4)
LYMPHOCYTES RELATIVE PERCENT: 25.1 % (ref 20–42)
MCH RBC QN AUTO: 27.3 PG (ref 26–35)
MCHC RBC AUTO-ENTMCNC: 31.4 % (ref 32–34.5)
MCV RBC AUTO: 87.2 FL (ref 80–99.9)
MONOCYTES ABSOLUTE: 0.38 E9/L (ref 0.1–0.95)
MONOCYTES RELATIVE PERCENT: 7.3 % (ref 2–12)
NEUTROPHILS ABSOLUTE: 3.16 E9/L (ref 1.8–7.3)
NEUTROPHILS RELATIVE PERCENT: 60.5 % (ref 43–80)
PDW BLD-RTO: 12.7 FL (ref 11.5–15)
PLATELET # BLD: 147 E9/L (ref 130–450)
PMV BLD AUTO: 12.7 FL (ref 7–12)
POTASSIUM SERPL-SCNC: 4.5 MMOL/L (ref 3.5–5)
RBC # BLD: 5.45 E12/L (ref 3.5–5.5)
SODIUM BLD-SCNC: 142 MMOL/L (ref 132–146)
TRIGL SERPL-MCNC: 81 MG/DL (ref 0–149)
VLDLC SERPL CALC-MCNC: 16 MG/DL
WBC # BLD: 5.2 E9/L (ref 4.5–11.5)

## 2020-02-14 PROCEDURE — G8427 DOCREV CUR MEDS BY ELIG CLIN: HCPCS | Performed by: FAMILY MEDICINE

## 2020-02-14 PROCEDURE — 80048 BASIC METABOLIC PNL TOTAL CA: CPT

## 2020-02-14 PROCEDURE — 99214 OFFICE O/P EST MOD 30 MIN: CPT | Performed by: FAMILY MEDICINE

## 2020-02-14 PROCEDURE — G8484 FLU IMMUNIZE NO ADMIN: HCPCS | Performed by: FAMILY MEDICINE

## 2020-02-14 PROCEDURE — 85025 COMPLETE CBC W/AUTO DIFF WBC: CPT

## 2020-02-14 PROCEDURE — 93000 ELECTROCARDIOGRAM COMPLETE: CPT | Performed by: FAMILY MEDICINE

## 2020-02-14 PROCEDURE — G8417 CALC BMI ABV UP PARAM F/U: HCPCS | Performed by: FAMILY MEDICINE

## 2020-02-14 PROCEDURE — 4004F PT TOBACCO SCREEN RCVD TLK: CPT | Performed by: FAMILY MEDICINE

## 2020-02-14 PROCEDURE — 80061 LIPID PANEL: CPT

## 2020-02-14 PROCEDURE — 3017F COLORECTAL CA SCREEN DOC REV: CPT | Performed by: FAMILY MEDICINE

## 2020-02-14 RX ORDER — GABAPENTIN 600 MG/1
TABLET ORAL
Qty: 180 TABLET | Refills: 1 | Status: SHIPPED
Start: 2020-02-14 | End: 2020-08-20 | Stop reason: SDUPTHER

## 2020-02-14 RX ORDER — TRAZODONE HYDROCHLORIDE 150 MG/1
TABLET ORAL
Qty: 90 TABLET | Refills: 1 | Status: SHIPPED
Start: 2020-02-14 | End: 2020-08-04

## 2020-02-14 ASSESSMENT — PATIENT HEALTH QUESTIONNAIRE - PHQ9
2. FEELING DOWN, DEPRESSED OR HOPELESS: 0
SUM OF ALL RESPONSES TO PHQ QUESTIONS 1-9: 0
1. LITTLE INTEREST OR PLEASURE IN DOING THINGS: 0
SUM OF ALL RESPONSES TO PHQ QUESTIONS 1-9: 0
SUM OF ALL RESPONSES TO PHQ9 QUESTIONS 1 & 2: 0

## 2020-02-14 ASSESSMENT — ENCOUNTER SYMPTOMS
SORE THROAT: 0
SINUS PRESSURE: 0
COUGH: 0
CONSTIPATION: 0
BACK PAIN: 0
EYE PAIN: 0
SHORTNESS OF BREATH: 0
ABDOMINAL PAIN: 0
DIARRHEA: 0

## 2020-02-14 NOTE — PROGRESS NOTES
Emotionally abused: None     Physically abused: None     Forced sexual activity: None   Other Topics Concern    None   Social History Narrative    None       Family History   Problem Relation Age of Onset    Heart Disease Mother     Arthritis Mother     Diabetes Mother     High Blood Pressure Mother     Kidney Disease Mother     Heart Disease Father     High Blood Pressure Father     Arthritis Father     Stroke Father     Diabetes Sister     Arthritis Maternal Uncle     Diabetes Maternal Uncle     Kidney Disease Maternal Uncle     Arthritis Maternal Grandmother           Current Outpatient Medications   Medication Sig Dispense Refill    traZODone (DESYREL) 150 MG tablet TAKE 1 TABLET BY MOUTH EVERY NIGHT 90 tablet 1    gabapentin (NEURONTIN) 600 MG tablet TAKE 1 TABLET BY MOUTH 2 TIMES DAILY AS NEEDED FOR PAIN 180 tablet 1     No current facility-administered medications for this visit. Allergies   Allergen Reactions    Pcn [Penicillins]     Penicillin G        Health Maintenance Due   Topic Date Due    Pneumococcal 0-64 years Vaccine (1 of 1 - PPSV23) 12/29/1972    Cervical cancer screen  12/29/1987    Shingles Vaccine (1 of 2) 12/29/2016    Flu vaccine (1) 09/01/2019    Colon Cancer Screen FIT/FOBT  03/05/2020           REVIEW OF SYSTEMS  Review of Systems    PHYSICAL EXAM  /78 (Site: Left Upper Arm, Position: Sitting, Cuff Size: Medium Adult)   Pulse 68   Temp 97.6 °F (36.4 °C)   Resp 16   Ht 5' (1.524 m)   Wt 140 lb (63.5 kg)   LMP 04/15/2015   SpO2 98%   BMI 27.34 kg/m²   Physical Exam         Laboratory:   All laboratory and radiology results have been personally reviewed by myself    Lab Results   Component Value Date     09/30/2019    K 4.2 09/30/2019     09/30/2019    CO2 29 09/30/2019    BUN 18 09/30/2019    CREATININE 1.3 09/30/2019    PROT 7.3 06/12/2019    LABALBU 4.5 06/12/2019    CALCIUM 9.5 09/30/2019    GFRAA 52 09/30/2019    LABGLOM 43

## 2020-02-14 NOTE — PROGRESS NOTES
SUBJECTIVE:  Jacob Paez is a 49 y.o. female who presents for a PRE-OPERATIVE PHYSICAL at the request of Dr. Jimenez Calderon in anticipation of right shoulder arthroscopy which is scheduled on 2/21/20. We discussed the following issues:     1) Denies problems with anesthesia or bleeding. An EKG performed in the office todayrevealed:  EKG shows normal sinus rhythm and no changes compared to previous from 2018. No acute STT changes     I have personally reviewed the nursing note and triage note for this patient. I also reviewed the visit navigator for clinical datarelevant to this visit. Past medical history, social and surgical history, family history, medications and allergies all reviewed and updated in the chart today. Race and ethnicity documented in EHR verified with patient,see demographics for further details.     FUNCTIONAL CAPACITY (Bolded line indicates patient's functional capacity level):  1-3        Watching television  Eating, dressing,cooking, using the toilet  Walking one or two blocks on level ground at 2-3 miles per hour  Doing light housework (ex dusting)    4-10        Climbing a flight of stairs     Walking on level ground at 4miles per hour  Running a short distance  Doing heavy chores around the house (ex scrubbing floors, lifting furniture)  Playing moderately strenuous sports (ex golf, dance, bowling)    >10    Playingstrenuous sports (ex tennis, basketball)    taken from AFP Volume 85, Number 3, p 241    RISK OF MAJOR CARDIAC COMPLICATIONS FOLLOWING NONCARDIAC SURGERY (bolded applies tothis patient)    Assign one point for each of the following six risk factors:  1 point high risk surgical procedure:   (intraperitoneal, intrathoracic, suprainguinal vascular)   1 point history of MI   history of positive stress test  current chest pain due to myocardial ischemia  current nitrate treatment  Q waves   1 point history of CHF  history of pulmonary edema  history of paroxysmal nocturnal dyspnea  current BL rales  current S3 gallop  current CXR with pulmonary congestion   1 point history of cerebrovascular disease (CVA or TIA)   1 point preoperative tx with insulin   1 point preoperative creatinine >2     Score  Risk Index class Risk of major cardiac complications (%)  0  I   0.4  1  II   0.9  2  III   6.6  3 or more IV   11.0    taken from AFP Volume 85, Number3, p 241      SURGICAL RISK CATEGORY (Bolded line indicates patient's category):  High   Cardiac risk >5% Aortic or other major vascular surgery        Peripheral vascular surgery  Intermediate  Cardiac risk 1-5% Carotid enterectomy        Head and neck surgery        Intraperitoneal or intrathoracic surgery        Orthopedic surgery        Prostate surgery  Low   Cardiac risk <1% Superficial procedures        Breast surgery        Cataract surgery        Endoscopic procedures        Most ambulatory surgeries    taken from  AFP Volume 85,Number 3, p 241    ROS   Review of Systems   Constitutional: Negative for fatigue and fever. HENT: Negative for ear pain, sinus pressure, sneezing and sore throat. Eyes: Negative for pain. Respiratory: Negative for cough and shortness of breath. Cardiovascular: Negative for chest pain and leg swelling. Gastrointestinal: Negative for abdominal pain, constipation and diarrhea. Genitourinary: Negative for dysuria and urgency. Musculoskeletal: Negative for back pain and myalgias. Right shoulder pain   Skin: Negative for rash. Allergic/Immunologic: Negative for food allergies. Neurological: Negative for light-headedness and headaches. Hematological: Does not bruise/bleed easily. Psychiatric/Behavioral: Negative for behavioral problems and sleep disturbance.        PAST MEDICAL HISTORY:  Patient Active Problem List   Diagnosis    Displacement of lumbar intervertebral disc without myelopathy    Spinal stenosis, lumbar region, without neurogenic claudication    S/P lumbar fusion Diabetes Maternal Uncle     Kidney Disease Maternal Uncle     Arthritis Maternal Grandmother        IMMUNIZATIONS:    There is no immunization history on file for this patient. OBJECTIVE/PYSICAL EXAMINATION:  /78 (Site: Left Upper Arm, Position: Sitting, Cuff Size: Medium Adult)   Pulse 68   Temp 97.6 °F (36.4 °C)   Resp 16   Ht 5' (1.524 m)   Wt 140 lb (63.5 kg)   LMP 04/15/2015   SpO2 98%   BMI 27.34 kg/m²    Physical Exam  Vitals signs and nursing note reviewed. Constitutional:       Appearance: She is well-developed. HENT:      Head: Normocephalic and atraumatic. Right Ear: External ear normal.      Left Ear: External ear normal.      Nose: Nose normal.   Eyes:      Conjunctiva/sclera: Conjunctivae normal.   Neck:      Musculoskeletal: Normal range of motion and neck supple. Thyroid: No thyromegaly. Cardiovascular:      Rate and Rhythm: Normal rate and regular rhythm. Heart sounds: Normal heart sounds. No murmur. No friction rub. No gallop. Pulmonary:      Effort: Pulmonary effort is normal.      Breath sounds: Normal breath sounds. No wheezing. Abdominal:      Palpations: Abdomen is soft. There is no mass. Tenderness: There is no abdominal tenderness. There is no guarding or rebound. Musculoskeletal: Normal range of motion. General: No tenderness. Lymphadenopathy:      Cervical: No cervical adenopathy. Skin:     General: Skin is warm and dry. Findings: No rash. Neurological:      Mental Status: She is alert and oriented to person, place, and time. Deep Tendon Reflexes: Reflexes are normal and symmetric. Psychiatric:         Behavior: Behavior normal.         ASSESSMENT/PLAN:     Diagnosis Orders   1. Pre-op evaluation  EKG 12 Lead    BASIC METABOLIC PANEL    CBC Auto Differential    EKG 12 Lead   2. CKD (chronic kidney disease) stage 4, GFR 15-29 ml/min ([facility])     3.  Postprocedural hypoparathyroidism ([address]       Patient is at low risk for intermediate risk surgery. Labs to be completed. HM reviewed today and updated as appropriate  Call or go to ED immediately if symptoms worsen or persist.  Future Appointments   Date Time Provider Jennifer Addison   3/6/2020 10:45 AM [facility]     Or sooner if necessary. Counseled regarding above diagnosis, including possible risks and complications,  especially if left uncontrolled. Counseled regarding the possible side effects, risks, benefits and alternatives to treatment; patient and/or guardian verbalizes understanding. Advised patient to call with any new medication issues. All questions answered.     Yamini Villasenor DO  2/14/2020

## 2020-02-19 ENCOUNTER — ANESTHESIA EVENT (OUTPATIENT)
Dept: OPERATING ROOM | Age: 55
End: 2020-02-19
Payer: COMMERCIAL

## 2020-02-20 ASSESSMENT — LIFESTYLE VARIABLES: SMOKING_STATUS: 1

## 2020-02-20 NOTE — ANESTHESIA PRE PROCEDURE
Department of Anesthesiology  Preprocedure Note       Name:  Daiana Wetzel   Age:  49 y.o.  :  1965                                          MRN:  [id number]         Date:  2020      Surgeon: Mis Ambriz): Keiry Cuba DO    Procedure: RIGHT SHOULDER ARTHROSCOPY. SUBACROMIAL DECOMPRESSION WITH VILMA PROCEDURE (Right )    Medications prior to admission:   Prior to Admission medications    Medication Sig Start Date End Date Taking? Authorizing Provider   gabapentin (NEURONTIN) 600 MG tablet TAKE 1 TABLET BY MOUTH 2 TIMES DAILY AS NEEDED FOR PAIN 20  Asha Joy DO   traZODone (DESYREL) 150 MG tablet TAKE 1 TABLET BY MOUTH EVERY NIGHT 20   Asha Joy DO       Current medications:    No current facility-administered medications for this encounter. Current Outpatient Medications   Medication Sig Dispense Refill    gabapentin (NEURONTIN) 600 MG tablet TAKE 1 TABLET BY MOUTH 2 TIMES DAILY AS NEEDED FOR PAIN 180 tablet 1    traZODone (DESYREL) 150 MG tablet TAKE 1 TABLET BY MOUTH EVERY NIGHT 90 tablet 1       Allergies:     Allergies   Allergen Reactions    Pcn [Penicillins]     Penicillin G        Problem List:    Patient Active Problem List   Diagnosis Code    Displacement of lumbar intervertebral disc without myelopathy M51.26    Spinal stenosis, lumbar region, without neurogenic claudication M48.061    S/P lumbar fusion (PLIF) L5-S1 Z98.1    Bilateral leg pain M79.604, M79.605    Bulging lumbar disc L4-5 M51.26    Chronic back pain M54.9, G89.29    Chronic pain G89.29    Lumbar post-laminectomy syndrome M96.1    Neural foraminal stenosis of lumbar spine M99.83    S/P parathyroidectomy Z98.890    Insomnia G47.00    Stage 3 chronic kidney disease (HCC) N18.3    Shoulder impingement, left M75.42    Arthritis of right acromioclavicular joint M19.011    Shoulder impingement, right M75.41    CKD (chronic kidney disease) stage 4, GFR 15-29 ml/min ([facility]) BILITOT 0.6 06/12/2019    ALKPHOS 65 06/12/2019    AST 16 06/12/2019    ALT 9 06/12/2019       POC Tests: No results for input(s): POCGLU, POCNA, POCK, POCCL, POCBUN, POCHEMO, POCHCT in the last 72 hours. Coags:   Lab Results   Component Value Date    PROTIME 10.5 04/14/2015    PROTIME 11.9 12/01/2010    INR 1.0 04/14/2015    APTT 29.3 12/01/2010       HCG (If Applicable):   Lab Results   Component Value Date    PREGTESTUR NEGATIVE 04/30/2014        ABGs: No results found for: PHART, PO2ART, FAO0MTL, NEP4KHV, BEART, F5HGFSOQ     Type & Screen (If Applicable):  No results found for: LABABO, [address]    Anesthesia Evaluation  Patient summary reviewed and Nursing notes reviewed  Airway: Mallampati: II  TM distance: >3 FB   Neck ROM: full  Mouth opening: > = 3 FB Dental: normal exam         Pulmonary: breath sounds clear to auscultation  (+) current smoker (5 pk yrs)                           Cardiovascular:Negative CV ROS          ECG reviewed  Rhythm: regular  Rate: normal                 ROS comment: EKG=NSR     Neuro/Psych:   (+) neuromuscular disease (lumbar fusion):,             GI/Hepatic/Renal:   (+) renal disease: CRI,           Endo/Other:    (+) : arthritis:., .                  ROS comment: parathyroidectomy Abdominal:       Abdomen: soft. Vascular:                                      Anesthesia Plan      general and regional     ASA 3     (Possible ISB)  Induction: intravenous. BIS  MIPS: Postoperative opioids intended and Prophylactic antiemetics administered. Plan discussed with CRNA. Alejandro Webster MD   2/20/2020    Pt seen questions answered H&P reviewed accepts interscalene block for post op pain and general anesthetic. Emily Palmer M.D 02/21/2020.

## 2020-02-21 ENCOUNTER — ANESTHESIA (OUTPATIENT)
Dept: OPERATING ROOM | Age: 55
End: 2020-02-21
Payer: COMMERCIAL

## 2020-02-21 ENCOUNTER — HOSPITAL ENCOUNTER (OUTPATIENT)
Age: 55
Setting detail: OUTPATIENT SURGERY
Discharge: HOME | End: 2020-02-21
Attending: ORTHOPAEDIC SURGERY | Admitting: ORTHOPAEDIC SURGERY
Payer: COMMERCIAL

## 2020-02-21 VITALS
BODY MASS INDEX: 27.48 KG/M2 | HEIGHT: 60 IN | HEART RATE: 76 BPM | TEMPERATURE: 97 F | WEIGHT: 140 LBS | DIASTOLIC BLOOD PRESSURE: 86 MMHG | OXYGEN SATURATION: 96 % | RESPIRATION RATE: 16 BRPM | SYSTOLIC BLOOD PRESSURE: 128 MMHG

## 2020-02-21 VITALS — SYSTOLIC BLOOD PRESSURE: 94 MMHG | OXYGEN SATURATION: 99 % | DIASTOLIC BLOOD PRESSURE: 59 MMHG | TEMPERATURE: 96.8 F

## 2020-02-21 PROBLEM — M75.41 IMPINGEMENT SYNDROME OF RIGHT SHOULDER: Status: ACTIVE | Noted: 2020-02-21

## 2020-02-21 PROBLEM — S43.431A TEAR OF RIGHT GLENOID LABRUM: Status: ACTIVE | Noted: 2020-02-21

## 2020-02-21 PROBLEM — S46.011A TRAUMATIC COMPLETE TEAR OF RIGHT ROTATOR CUFF: Status: ACTIVE | Noted: 2020-02-21

## 2020-02-21 PROCEDURE — C1713 ANCHOR/SCREW BN/BN,TIS/BN: HCPCS | Performed by: ORTHOPAEDIC SURGERY

## 2020-02-21 PROCEDURE — 76942 ECHO GUIDE FOR BIOPSY: CPT | Performed by: ANESTHESIOLOGY

## 2020-02-21 PROCEDURE — 2500000003 HC RX 250 WO HCPCS: Performed by: NURSE ANESTHETIST, CERTIFIED REGISTERED

## 2020-02-21 PROCEDURE — 7100000001 HC PACU RECOVERY - ADDTL 15 MIN: Performed by: ORTHOPAEDIC SURGERY

## 2020-02-21 PROCEDURE — 3700000000 HC ANESTHESIA ATTENDED CARE: Performed by: ORTHOPAEDIC SURGERY

## 2020-02-21 PROCEDURE — 7100000011 HC PHASE II RECOVERY - ADDTL 15 MIN: Performed by: ORTHOPAEDIC SURGERY

## 2020-02-21 PROCEDURE — 29823 SHO ARTHRS SRG XTNSV DBRDMT: CPT | Performed by: ORTHOPAEDIC SURGERY

## 2020-02-21 PROCEDURE — 6360000002 HC RX W HCPCS: Performed by: NURSE ANESTHETIST, CERTIFIED REGISTERED

## 2020-02-21 PROCEDURE — 29827 SHO ARTHRS SRG RT8TR CUF RPR: CPT | Performed by: ORTHOPAEDIC SURGERY

## 2020-02-21 PROCEDURE — 6360000002 HC RX W HCPCS: Performed by: ORTHOPAEDIC SURGERY

## 2020-02-21 PROCEDURE — 3600000014 HC SURGERY LEVEL 4 ADDTL 15MIN: Performed by: ORTHOPAEDIC SURGERY

## 2020-02-21 PROCEDURE — 6360000002 HC RX W HCPCS: Performed by: ANESTHESIOLOGY

## 2020-02-21 PROCEDURE — 2580000003 HC RX 258: Performed by: ANESTHESIOLOGY

## 2020-02-21 PROCEDURE — 2500000003 HC RX 250 WO HCPCS: Performed by: NURSE PRACTITIONER

## 2020-02-21 PROCEDURE — 3600000004 HC SURGERY LEVEL 4 BASE: Performed by: ORTHOPAEDIC SURGERY

## 2020-02-21 PROCEDURE — 3700000001 HC ADD 15 MINUTES (ANESTHESIA): Performed by: ORTHOPAEDIC SURGERY

## 2020-02-21 PROCEDURE — 2580000003 HC RX 258: Performed by: ORTHOPAEDIC SURGERY

## 2020-02-21 PROCEDURE — 7100000000 HC PACU RECOVERY - FIRST 15 MIN: Performed by: ORTHOPAEDIC SURGERY

## 2020-02-21 PROCEDURE — 2709999900 HC NON-CHARGEABLE SUPPLY: Performed by: ORTHOPAEDIC SURGERY

## 2020-02-21 PROCEDURE — 29826 SHO ARTHRS SRG DECOMPRESSION: CPT | Performed by: ORTHOPAEDIC SURGERY

## 2020-02-21 PROCEDURE — 2720000010 HC SURG SUPPLY STERILE: Performed by: ORTHOPAEDIC SURGERY

## 2020-02-21 PROCEDURE — 7100000010 HC PHASE II RECOVERY - FIRST 15 MIN: Performed by: ORTHOPAEDIC SURGERY

## 2020-02-21 DEVICE — ANCHOR SUT L24.5MM DIA4.75MM BIOCOMPOSITE SELF PUNCHING: Type: IMPLANTABLE DEVICE | Status: FUNCTIONAL

## 2020-02-21 RX ORDER — ONDANSETRON 2 MG/ML
INJECTION INTRAMUSCULAR; INTRAVENOUS PRN
Status: DISCONTINUED | OUTPATIENT
Start: 2020-02-21 | End: 2020-02-21 | Stop reason: SDUPTHER

## 2020-02-21 RX ORDER — DEXAMETHASONE SODIUM PHOSPHATE 10 MG/ML
INJECTION, SOLUTION INTRAMUSCULAR; INTRAVENOUS PRN
Status: DISCONTINUED | OUTPATIENT
Start: 2020-02-21 | End: 2020-02-21 | Stop reason: SDUPTHER

## 2020-02-21 RX ORDER — PROPOFOL 10 MG/ML
INJECTION, EMULSION INTRAVENOUS PRN
Status: DISCONTINUED | OUTPATIENT
Start: 2020-02-21 | End: 2020-02-21 | Stop reason: SDUPTHER

## 2020-02-21 RX ORDER — SODIUM CHLORIDE 9 MG/ML
INJECTION, SOLUTION INTRAVENOUS CONTINUOUS
Status: DISCONTINUED | OUTPATIENT
Start: 2020-02-21 | End: 2020-02-21 | Stop reason: HOSPADM

## 2020-02-21 RX ORDER — FENTANYL CITRATE 50 UG/ML
INJECTION, SOLUTION INTRAMUSCULAR; INTRAVENOUS PRN
Status: DISCONTINUED | OUTPATIENT
Start: 2020-02-21 | End: 2020-02-21 | Stop reason: SDUPTHER

## 2020-02-21 RX ORDER — ROPIVACAINE HYDROCHLORIDE 5 MG/ML
INJECTION, SOLUTION EPIDURAL; INFILTRATION; PERINEURAL PRN
Status: DISCONTINUED | OUTPATIENT
Start: 2020-02-21 | End: 2020-02-21 | Stop reason: SDUPTHER

## 2020-02-21 RX ORDER — GLYCOPYRROLATE 1 MG/5 ML
SYRINGE (ML) INTRAVENOUS PRN
Status: DISCONTINUED | OUTPATIENT
Start: 2020-02-21 | End: 2020-02-21 | Stop reason: SDUPTHER

## 2020-02-21 RX ORDER — LIDOCAINE HYDROCHLORIDE 20 MG/ML
INJECTION, SOLUTION INTRAVENOUS PRN
Status: DISCONTINUED | OUTPATIENT
Start: 2020-02-21 | End: 2020-02-21 | Stop reason: SDUPTHER

## 2020-02-21 RX ORDER — CLINDAMYCIN PHOSPHATE 900 MG/50ML
900 INJECTION INTRAVENOUS ONCE
Status: COMPLETED | OUTPATIENT
Start: 2020-02-21 | End: 2020-02-21

## 2020-02-21 RX ORDER — MIDAZOLAM HYDROCHLORIDE 1 MG/ML
INJECTION INTRAMUSCULAR; INTRAVENOUS PRN
Status: DISCONTINUED | OUTPATIENT
Start: 2020-02-21 | End: 2020-02-21 | Stop reason: SDUPTHER

## 2020-02-21 RX ORDER — NEOSTIGMINE METHYLSULFATE 1 MG/ML
INJECTION, SOLUTION INTRAVENOUS PRN
Status: DISCONTINUED | OUTPATIENT
Start: 2020-02-21 | End: 2020-02-21 | Stop reason: SDUPTHER

## 2020-02-21 RX ORDER — ROCURONIUM BROMIDE 10 MG/ML
INJECTION, SOLUTION INTRAVENOUS PRN
Status: DISCONTINUED | OUTPATIENT
Start: 2020-02-21 | End: 2020-02-21 | Stop reason: SDUPTHER

## 2020-02-21 RX ORDER — ROPIVACAINE HYDROCHLORIDE 5 MG/ML
INJECTION, SOLUTION EPIDURAL; INFILTRATION; PERINEURAL
Status: DISCONTINUED | OUTPATIENT
Start: 2020-02-21 | End: 2020-02-21 | Stop reason: SDUPTHER

## 2020-02-21 RX ORDER — ROPIVACAINE HYDROCHLORIDE 5 MG/ML
30 INJECTION, SOLUTION EPIDURAL; INFILTRATION; PERINEURAL ONCE
Status: CANCELLED | OUTPATIENT
Start: 2020-02-21 | End: 2020-02-21

## 2020-02-21 RX ORDER — CLINDAMYCIN HYDROCHLORIDE 300 MG/1
300 CAPSULE ORAL 3 TIMES DAILY
Qty: 10 CAPSULE | Refills: 0 | Status: SHIPPED | OUTPATIENT
Start: 2020-02-21 | End: 2020-02-25

## 2020-02-21 RX ORDER — SODIUM CHLORIDE, SODIUM LACTATE, POTASSIUM CHLORIDE, CALCIUM CHLORIDE 600; 310; 30; 20 MG/100ML; MG/100ML; MG/100ML; MG/100ML
INJECTION, SOLUTION INTRAVENOUS CONTINUOUS
Status: DISCONTINUED | OUTPATIENT
Start: 2020-02-21 | End: 2020-02-21 | Stop reason: HOSPADM

## 2020-02-21 RX ORDER — OXYCODONE AND ACETAMINOPHEN 7.5; 325 MG/1; MG/1
1 TABLET ORAL EVERY 6 HOURS PRN
Qty: 28 TABLET | Refills: 0 | Status: SHIPPED | OUTPATIENT
Start: 2020-02-21 | End: 2020-03-24 | Stop reason: SDUPTHER

## 2020-02-21 RX ADMIN — Medication 3 MG: at 13:08

## 2020-02-21 RX ADMIN — SODIUM CHLORIDE, POTASSIUM CHLORIDE, SODIUM LACTATE AND CALCIUM CHLORIDE: 600; 310; 30; 20 INJECTION, SOLUTION INTRAVENOUS at 10:34

## 2020-02-21 RX ADMIN — SODIUM CHLORIDE: 9 INJECTION, SOLUTION INTRAVENOUS at 11:46

## 2020-02-21 RX ADMIN — Medication 0.6 MG: at 13:08

## 2020-02-21 RX ADMIN — ONDANSETRON HYDROCHLORIDE 4 MG: 2 INJECTION, SOLUTION INTRAMUSCULAR; INTRAVENOUS at 13:00

## 2020-02-21 RX ADMIN — DEXAMETHASONE SODIUM PHOSPHATE 10 MG: 10 INJECTION, SOLUTION INTRAMUSCULAR; INTRAVENOUS at 12:10

## 2020-02-21 RX ADMIN — ROPIVACAINE HYDROCHLORIDE 30 ML: 5 INJECTION, SOLUTION EPIDURAL; INFILTRATION; PERINEURAL at 13:25

## 2020-02-21 RX ADMIN — LIDOCAINE HYDROCHLORIDE 60 MG: 20 INJECTION, SOLUTION INTRAVENOUS at 12:00

## 2020-02-21 RX ADMIN — Medication 30 MG: at 12:00

## 2020-02-21 RX ADMIN — MIDAZOLAM 2 MG: 1 INJECTION INTRAMUSCULAR; INTRAVENOUS at 11:46

## 2020-02-21 RX ADMIN — FENTANYL CITRATE 100 MCG: 50 INJECTION, SOLUTION INTRAMUSCULAR; INTRAVENOUS at 11:46

## 2020-02-21 RX ADMIN — PROPOFOL 200 MG: 10 INJECTION, EMULSION INTRAVENOUS at 12:00

## 2020-02-21 RX ADMIN — ROPIVACAINE HYDROCHLORIDE 30 ML: 5 INJECTION, SOLUTION EPIDURAL; INFILTRATION; PERINEURAL at 11:55

## 2020-02-21 RX ADMIN — CLINDAMYCIN PHOSPHATE 900 MG: 18 INJECTION, SOLUTION INTRAVENOUS at 11:46

## 2020-02-21 ASSESSMENT — PAIN SCALES - GENERAL
PAINLEVEL_OUTOF10: 0

## 2020-02-21 ASSESSMENT — PAIN DESCRIPTION - DESCRIPTORS: DESCRIPTORS: ACHING;DISCOMFORT

## 2020-02-21 ASSESSMENT — PAIN - FUNCTIONAL ASSESSMENT: PAIN_FUNCTIONAL_ASSESSMENT: 0-10

## 2020-02-21 NOTE — H&P
Updated H&P    Chief Complaint   Patient presents with    Follow-up       follow up from right shoulder pain, patient is here for MRi results         Jacob Paez is a 49y.o. year old   female who is seen today  for evaluation of right shoulder pain. She reports the pain has been ongoing for the past 5 months. She does not recall a specific injury which started the pain. .   She reports the pain is worse with activity, better with rest.  The patient does have mechanical symptoms. Shedoes have night pain. She denies a feeling of instability. The prior treatments have been PT topical creams/patches. The patient   has not responded to the treatment. The patient is left hand dominant. The patient is not working. .              Chief Complaint   Patient presents with    Follow-up       follow up from right shoulder pain, patient is here for MRi results      Past Medical History        Past Medical History:   Diagnosis Date    Chronic back pain      Chronic kidney disease           Past Surgical History         Past Surgical History:   Procedure Laterality Date    CARPAL TUNNEL RELEASE         two carpal tunnel surgeries    CHOLECYSTECTOMY        GALLBLADDER SURGERY        LUMBAR FUSION   2/1/2013     PLIF  L5 - S1    NOSE SURGERY         four nose surgeries    PARATHYROIDECTOMY Right 02/09/2018    ROTATOR CUFF REPAIR        SPINE SURGERY               Current Medication      Current Outpatient Medications:     traZODone (DESYREL) 150 MG tablet, TAKE 1 TABLET BY MOUTH EVERY NIGHT, Disp: 90 tablet, Rfl: 1    gabapentin (NEURONTIN) 600 MG tablet, TAKE 1 TABLET BY MOUTH 2 TIMES DAILY AS NEEDED FOR PAIN, Disp: 180 tablet, Rfl: 1          Allergies   Allergen Reactions    Pcn [Penicillins]      Penicillin G        Social History               Socioeconomic History    Marital status:         Spouse name: Not on file    Number of children: Not on file    Years of education: Not on file psoriasis,(-) eczema, (-)skin cancer. Musculoskeletal: (-) fractures,  (-) dislocations,(-) collagen vascular disease, (-) fibromyalgia, (-) multiple sclerosis, (-) muscular dystrophy, (-) RSD,(-) joint pain (-)swelling, (-) joint pain,swelling. Neurologic: (-) epilepsy, (-)seizures,(-) brain tumor,(-) TIA, (-)stroke, (-)headaches, (-)Parkinson disease,(-) memory loss, (-) LOC. Cardiovascular: (-) Chest pain, (-) swelling in legs/feet, (-) SOB, (-) cramping in legs/feet with walking. Respiratory: (-) SOB, (-) Coughing, (-) night sweats. GI: (-) nausea, (-) vomiting, (-) diarrhea, (-) blood in stool, (-) gastric ulcer. Psychiatric: (-) Depression, (-) Anxiety, (-) bipolar disease, (-) Alzheimer's Disease  Allergic/Immunologic: (-) allergies latex, (-) allergies metal, (-) skin sensitivity. Hematlogic: (-) anemia, (-) blood transfusion, (-) DVT/PE, (-) Clotting disorders        Subjective:     Vital signs are stable.  In general, patient is awake, alert and oriented X3, in no apparent distress.  Examination of HENT reveals normocephalic, atraumatic.  PERRLA/EOMI sclera are white.  Conjunctivae are clear.  TM's are intact.  Pharynx is pink and moist.  Uvula and tongue are midline.  Heart: Positive S1 and positive S2 with regular rate and rhythm.  Lungs: Clear to auscultation bilaterally without rales, rhonchi or wheezes.  Abdomen: soft, nontender.  Positive bowel sounds.  No organomegaly.  No guarding or rigidity.        Constitution:  /84   Pulse 70   Temp 98 °F (36.7 °C) (Skin)   Resp 16   Ht 5' (1.524 m)   Wt 140 lb (63.5 kg)   LMP 04/15/2015   SpO2 99%   BMI 27.34 kg/m²        Psycihatric:  The patient is alert and oriented x 3, appears to be stated age and in no distress.       Respiratory:  Respiratory effort is not labored. Patient is not gasping. Palpation of the chest reveals no tactile fremitus.     Skin:  Upon inspection: the skin appears warm, dry and intact.   There is  a previous and benefits of the shoulder arthroscopy with the patient. The risks include but are not limited to: infection, injuries to blood vessels and nerves, non relief of symptoms, intraoperative fracture, need for further operative intervention, blood loss, arthrofibrosis of shoulder, DVT/PE, MI and death. The patient understands these risks and wishes to proceed with surgery. I will perform a Right shoulder arthroscopy, SAD and debridement with rajeev procedure 2/21/2020.

## 2020-02-21 NOTE — ANESTHESIA POSTPROCEDURE EVALUATION
Department of Anesthesiology  Postprocedure Note    Patient: Bam Alcala  MRN: [id number]  YOB: 1965  Date of evaluation: 2/21/2020  Time:  2:30 PM     Procedure Summary     Date:  02/21/20 Room / Location:  [address] [place] [address] 02 / [address] [facility]    Anesthesia Start:  2968 Anesthesia Stop:  5266    Procedure:  RIGHT SHOULDER ARTHROSCOPY. SUBACROMIAL DECOMPRESSION, LABRAL AND BICEP  DEBRIDEMENT, ROTATOR CUFF REPAIR (Right ) Diagnosis:  (RIGHT SHOULDER IMPINGEMENT)    Surgeon:  Jim Scruggs DO Responsible Provider:  Ender Wolfe MD    Anesthesia Type:  general, regional ASA Status:  3          Anesthesia Type: general, regional    Omar Phase I: Omar Score: 10    Omar Phase II: Omar Score: 10    Last vitals: Reviewed and per EMR flowsheets.        Anesthesia Post Evaluation    Patient location during evaluation: PACU  Patient participation: complete - patient participated  Level of consciousness: awake  Pain score: 3  Airway patency: patent  Nausea & Vomiting: no nausea  Complications: no  Cardiovascular status: blood pressure returned to baseline  Respiratory status: acceptable  Hydration status: euvolemic

## 2020-02-21 NOTE — OP NOTE
removing all bursal tissue from the subacromial space. Using an [employer]thro[employer]   electrode, I outlined the acromial borders. I then carried out an anterior inferior   Acromioplasty, using a 5.0 barrel salena, smoothing the hooked acromion to a flat type 1 contour. I then prepared the footprint of the greater tuberosity for implantation of the tendon down to nice bleeding bone. Tendon was debrided and cleaned up. The cuff tear measured about 1 cm in dimension and was full-thickness. I then choose to peform a single row repair. I then went to repair the rotator cuff, I placed a inverted horizontal mattress within the cuff   Tear approximately 17 mm from edge of tendon. I grabbed the stitch out through the superior lateral portal where I made the anchor portal trough. And placed 1 lateral row anchor lateral to the greater tuberosity. This was impacted into position. The cuff was reapproximated to its bed, had great fixation on   the cuff by pulling tension on the stitches and screwed the anchor into position. Thus reapproximating the tendon to its  bed. The cuff was nicely approximated to its bed and had no abnormalities. I did thoroughly irrigate the wound upon closure, I checked the cuff for stability using a hook probe. The tendon repair was snug and well approximated to its footprint. I then removed all fluid from the shoulder joint and closed the incision with 4-0 Prolene in a horizontal mattress-type fashion. Sterile dressing was placed on the wound. Patient placed in a splint. The Patient tolerated the procedure well, woke up in the   recovery room without difficulty.

## 2020-02-21 NOTE — ANESTHESIA PROCEDURE NOTES
Peripheral Block    Patient location during procedure: OR  Start time: 2/21/2020 11:50 AM  End time: 2/21/2020 11:54 AM  Staffing  Anesthesiologist: Chula London MD  Performed: anesthesiologist   Preanesthetic Checklist  Completed: patient identified, site marked, surgical consent, pre-op evaluation, timeout performed, IV checked, risks and benefits discussed, monitors and equipment checked, anesthesia consent given, oxygen available and patient being monitored  Peripheral Block  Patient position: supine  Prep: ChloraPrep  Patient monitoring: cardiac monitor, continuous pulse ox, frequent blood pressure checks and IV access  Block type: Brachial plexus  Laterality: right  Injection technique: single-shot  Procedures: ultrasound guided  Interscalene  Provider prep: mask and sterile gloves  Needle  Needle type: combined needle/nerve stimulator   Needle gauge: 21 G  Needle length: 8 cm  Needle localization: ultrasound guidance  Test dose: negative  Assessment  Injection assessment: negative aspiration for heme and local visualized surrounding nerve on ultrasound  Paresthesia pain: immediately resolved  Slow fractionated injection: yes  Hemodynamics: stable  Reason for block: post-op pain management and at surgeon's request

## 2020-03-06 ENCOUNTER — OFFICE VISIT (OUTPATIENT)
Dept: ORTHOPEDIC SURGERY | Age: 55
End: 2020-03-06

## 2020-03-06 VITALS — TEMPERATURE: 98 F | WEIGHT: 135 LBS | HEIGHT: 60 IN | BODY MASS INDEX: 26.5 KG/M2

## 2020-03-06 PROCEDURE — 99024 POSTOP FOLLOW-UP VISIT: CPT | Performed by: NURSE PRACTITIONER

## 2020-03-06 NOTE — PROGRESS NOTES
Robert Tafoya is here for follow-up after right shoulder arthroscopy. Findings at surgery: rtc tear, impingement, partial bicep tendon tear. Pain is controlled with current analgesics. Medication(s) being used: percocet 7.5 mg at night. The patient denies fever, wound drainage, increasing redness, pus, increasing pain, increasing swelling. Post op problems reported: none. She is ambulating sling. Physical exam:  The wounds are clean, dry, no drainage. She has intact motor and sensory functions, grossly intact in the median, ulnar, radial, musculocutaneous, and axillary nerve distributions. She   has bounding pulses in the wrist.  Capillary refill is less than 2 seconds in all digits. Surgical pictures from the surgery were reveiwed with the patient     Impression:   Encounter Diagnoses   Name Primary?  Shoulder impingement, right Yes    Complete tear of right rotator cuff, unspecified whether traumatic          Plan:    I will remove the surgical sutures. The patient will now D/C the abduction pillow. She  will begin range of motion at the elbow, wrist and hand. She will continue to use analgesics to control the pain and will continue with sling immobilization. I will see them back in 4 weeks for repeat evaluation.

## 2020-03-24 ENCOUNTER — TELEPHONE (OUTPATIENT)
Dept: ORTHOPEDIC SURGERY | Age: 55
End: 2020-03-24

## 2020-03-24 RX ORDER — OXYCODONE AND ACETAMINOPHEN 7.5; 325 MG/1; MG/1
1 TABLET ORAL EVERY 6 HOURS PRN
Qty: 28 TABLET | Refills: 0 | Status: SHIPPED | OUTPATIENT
Start: 2020-03-24 | End: 2020-03-31

## 2020-05-28 ENCOUNTER — OFFICE VISIT (OUTPATIENT)
Dept: ORTHOPEDIC SURGERY | Age: 55
End: 2020-05-28
Payer: COMMERCIAL

## 2020-05-28 VITALS — TEMPERATURE: 98 F | BODY MASS INDEX: 26.5 KG/M2 | HEIGHT: 60 IN | WEIGHT: 135 LBS

## 2020-05-28 PROCEDURE — 99212 OFFICE O/P EST SF 10 MIN: CPT | Performed by: NURSE PRACTITIONER

## 2020-05-28 PROCEDURE — G8417 CALC BMI ABV UP PARAM F/U: HCPCS | Performed by: NURSE PRACTITIONER

## 2020-05-28 PROCEDURE — 4004F PT TOBACCO SCREEN RCVD TLK: CPT | Performed by: NURSE PRACTITIONER

## 2020-05-28 PROCEDURE — 3017F COLORECTAL CA SCREEN DOC REV: CPT | Performed by: NURSE PRACTITIONER

## 2020-05-28 PROCEDURE — G8427 DOCREV CUR MEDS BY ELIG CLIN: HCPCS | Performed by: NURSE PRACTITIONER

## 2020-05-28 NOTE — PROGRESS NOTES
status: Current Every Day Smoker     Packs/day: 0.50     Years: 10.00     Pack years: 5.00     Types: Cigarettes    Smokeless tobacco: Never Used    Tobacco comment: quit smoking  11/2012   Substance and Sexual Activity    Alcohol use: No    Drug use: No    Sexual activity: Not on file   Lifestyle    Physical activity     Days per week: Not on file     Minutes per session: Not on file    Stress: Not on file   Relationships    Social connections     Talks on phone: Not on file     Gets together: Not on file     Attends Evangelical service: Not on file     Active member of club or organization: Not on file     Attends meetings of clubs or organizations: Not on file     Relationship status: Not on file    Intimate partner violence     Fear of current or ex partner: Not on file     Emotionally abused: Not on file     Physically abused: Not on file     Forced sexual activity: Not on file   Other Topics Concern    Not on file   Social History Narrative    Not on file     Family History   Problem Relation Age of Onset    Heart Disease Mother     Arthritis Mother     Diabetes Mother     High Blood Pressure Mother     Kidney Disease Mother     Heart Disease Father     High Blood Pressure Father     Arthritis Father     Stroke Father     Diabetes Sister     Arthritis Maternal Uncle     Diabetes Maternal Uncle     Kidney Disease Maternal Uncle     Arthritis Maternal Grandmother        REVIEW OF SYSTEMS:     General/Constitution:  (-)weight loss, (-)fever, (-)chills, (-)weakness. Skin: (-) rash,(-) psoriasis,(-) eczema, (-)skin cancer. Musculoskeletal: (-) fractures,  (-) dislocations,(-) collagen vascular disease, (-) fibromyalgia, (-) multiple sclerosis, (-) muscular dystrophy, (-) RSD,(-) joint pain (-)swelling, (-) joint pain,swelling. Neurologic: (-) epilepsy, (-)seizures,(-) brain tumor,(-) TIA, (-)stroke, (-)headaches, (-)Parkinson disease,(-) memory loss, (-) LOC.   Cardiovascular: (-) Chest pain, (-) swelling in legs/feet, (-) SOB, (-) cramping in legs/feet with walking. Respiratory: (-) SOB, (-) Coughing, (-) night sweats. GI: (-) nausea, (-) vomiting, (-) diarrhea, (-) blood in stool, (-) gastric ulcer. Psychiatric: (-) Depression, (-) Anxiety, (-) bipolar disease, (-) Alzheimer's Disease  Allergic/Immunologic: (-) allergies latex, (-) allergies metal, (-) skin sensitivity. Hematlogic: (-) anemia, (-) blood transfusion, (-) DVT/PE, (-) Clotting disorders    SUBJECTIVE:    Constitution:  The patient is alert and oriented x 3, appears to be stated age and in no distress. Temp 98 °F (36.7 °C)   Ht 5' (1.524 m)   Wt 135 lb (61.2 kg)   LMP 04/15/2015   BMI 26.37 kg/m²       Skin:  Upon inspection: the skin appears warm, dry and intact. There is  a previous scar over the affected area. There is not any cellulitis, lymphedema or cutaneous lesions noted in the lower extremities. Upon palpation there is no induration noted. Neurologic:  Gait: normal;  Motor exam of the upper extremities show: The reflexes in biceps/triceps/brachioradialis are equal and symmetric. Sensory exam C5-T1 are normal bilaterally. Cardiovascular: The vascular exam is normal and is well perfused to distal extremities. There are 2+ radial pulses bilaterally, and motor and sensation is intact to median, ulnar, and radial, musclocutaneus, and axillary nerve distribution and grossly symmetric bilaterally. There is cap refill noted less than two seconds in all digits. There is not edema of the bilateral upper extremities. There is not varicosities noted in the distal extremities. Lymph:  Upon palpation,  there is no lymphadenopathy noted in bilateral upper extremities. Musculoskeletal:  Gait: normal; examination of the nails and digits reveal no cyanosis or clubbing. Cervical Exam:  On physical exam, Elkin Poplar is well-developed, well-nourished, oriented to person, place and time.   her gait is

## 2020-08-04 RX ORDER — TRAZODONE HYDROCHLORIDE 150 MG/1
TABLET ORAL
Qty: 90 TABLET | Refills: 0 | Status: SHIPPED
Start: 2020-08-04 | End: 2020-10-30

## 2020-08-20 ENCOUNTER — OFFICE VISIT (OUTPATIENT)
Dept: FAMILY MEDICINE CLINIC | Age: 55
End: 2020-08-20
Payer: COMMERCIAL

## 2020-08-20 ENCOUNTER — TELEPHONE (OUTPATIENT)
Dept: FAMILY MEDICINE CLINIC | Age: 55
End: 2020-08-20

## 2020-08-20 VITALS
TEMPERATURE: 98.1 F | SYSTOLIC BLOOD PRESSURE: 108 MMHG | WEIGHT: 149 LBS | HEART RATE: 65 BPM | RESPIRATION RATE: 16 BRPM | OXYGEN SATURATION: 93 % | BODY MASS INDEX: 29.25 KG/M2 | DIASTOLIC BLOOD PRESSURE: 72 MMHG | HEIGHT: 60 IN

## 2020-08-20 PROCEDURE — G8427 DOCREV CUR MEDS BY ELIG CLIN: HCPCS | Performed by: FAMILY MEDICINE

## 2020-08-20 PROCEDURE — G8417 CALC BMI ABV UP PARAM F/U: HCPCS | Performed by: FAMILY MEDICINE

## 2020-08-20 PROCEDURE — 99214 OFFICE O/P EST MOD 30 MIN: CPT | Performed by: FAMILY MEDICINE

## 2020-08-20 PROCEDURE — 3017F COLORECTAL CA SCREEN DOC REV: CPT | Performed by: FAMILY MEDICINE

## 2020-08-20 PROCEDURE — 4004F PT TOBACCO SCREEN RCVD TLK: CPT | Performed by: FAMILY MEDICINE

## 2020-08-20 RX ORDER — GABAPENTIN 600 MG/1
TABLET ORAL
Qty: 180 TABLET | Refills: 1 | Status: SHIPPED
Start: 2020-08-20 | End: 2020-08-20 | Stop reason: SDUPTHER

## 2020-08-20 RX ORDER — GABAPENTIN 600 MG/1
TABLET ORAL
Qty: 180 TABLET | Refills: 1 | Status: SHIPPED
Start: 2020-08-20 | End: 2021-02-11 | Stop reason: SDUPTHER

## 2020-08-20 ASSESSMENT — ENCOUNTER SYMPTOMS
BACK PAIN: 0
ABDOMINAL PAIN: 0
EYE PAIN: 0
DIARRHEA: 0
SINUS PRESSURE: 0
COUGH: 0
SORE THROAT: 0
CONSTIPATION: 0
SHORTNESS OF BREATH: 0

## 2020-08-20 NOTE — PROGRESS NOTES
Italo Yi  : 1965    Chief Complaint:     Chief Complaint   Patient presents with    Pain     refill for gabapent for neuropathy       HPI  Italo Yi 49 y.o. presents for   Chief Complaint   Patient presents with    Pain     refill for gabapent for neuropathy     Patient presents for follow-up today. She states that 2 weeks ago she had an episode of chest pain that lasted for 3 days. She refuses to go to the ED and she did take aspirin which seemed to help the pain. She has never had any issues with her heart before. She states that she has not had any more episodes of chest pain. This comes and goes. It is not with exertion. Sometimes she has a chest pain when she is sitting. There is a family history of heart issues however. She continues to follow with nephrology for CKD. Does need a referral for a mammogram.  Her pain is under good control. She has been sleeping well at night. All questions were answered to patients satisfaction. Past Medical History:   Diagnosis Date    Chronic back pain     Chronic kidney disease     stage 3       Past Surgical History:   Procedure Laterality Date    CARPAL TUNNEL RELEASE      two carpal tunnel surgeries    CHOLECYSTECTOMY      GALLBLADDER SURGERY      LUMBAR FUSION  2013    PLIF  L5 - S1    NOSE SURGERY      four nose surgeries    PARATHYROIDECTOMY Right 2018    ROTATOR CUFF REPAIR      SHOULDER ARTHROSCOPY Right 2020    SHOULDER ARTHROSCOPY Right 2020    RIGHT SHOULDER ARTHROSCOPY. SUBACROMIAL DECOMPRESSION, LABRAL AND BICEP  DEBRIDEMENT, ROTATOR CUFF REPAIR performed by Murphy Olea DO at [address] History     Socioeconomic History    Marital status:       Spouse name: None    Number of children: None    Years of education: None    Highest education level: None   Occupational History    None   Social Needs    Financial resource strain: None  Food insecurity     Worry: None     Inability: None    Transportation needs     Medical: None     Non-medical: None   Tobacco Use    Smoking status: Current Every Day Smoker     Packs/day: 0.50     Years: 10.00     Pack years: 5.00     Types: Cigarettes    Smokeless tobacco: Never Used    Tobacco comment: quit smoking  11/2012   Substance and Sexual Activity    Alcohol use: No    Drug use: No    Sexual activity: None   Lifestyle    Physical activity     Days per week: None     Minutes per session: None    Stress: None   Relationships    Social connections     Talks on phone: None     Gets together: None     Attends Spiritism service: None     Active member of club or organization: None     Attends meetings of clubs or organizations: None     Relationship status: None    Intimate partner violence     Fear of current or ex partner: None     Emotionally abused: None     Physically abused: None     Forced sexual activity: None   Other Topics Concern    None   Social History Narrative    None       Family History   Problem Relation Age of Onset    Heart Disease Mother     Arthritis Mother     Diabetes Mother     High Blood Pressure Mother     Kidney Disease Mother     Heart Disease Father     High Blood Pressure Father     Arthritis Father     Stroke Father     Diabetes Sister     Arthritis Maternal Uncle     Diabetes Maternal Uncle     Kidney Disease Maternal Uncle     Arthritis Maternal Grandmother           Current Outpatient Medications   Medication Sig Dispense Refill    gabapentin (NEURONTIN) 600 MG tablet TAKE 1 TABLET BY MOUTH 2 TIMES DAILY AS NEEDED FOR PAIN 180 tablet 1    traZODone (DESYREL) 150 MG tablet TAKE ONE TABLET BY MOUTH EVERY NIGHT 90 tablet 0     No current facility-administered medications for this visit.         Allergies   Allergen Reactions    Penicillin G        Health Maintenance Due   Topic Date Due    Pneumococcal 0-64 years Vaccine (1 of 3 - PCV13) 12/29/1972    Cervical cancer screen  12/29/1987    Shingles Vaccine (1 of 2) 12/29/2016    Colon Cancer Screen FIT/FOBT  03/05/2020    Breast cancer screen  08/30/2020           REVIEW OF SYSTEMS  Review of Systems   Constitutional: Negative for fatigue and fever. HENT: Negative for ear pain, sinus pressure, sneezing and sore throat. Eyes: Negative for pain. Respiratory: Negative for cough and shortness of breath. Cardiovascular: Positive for chest pain. Negative for leg swelling. Gastrointestinal: Negative for abdominal pain, constipation and diarrhea. Genitourinary: Negative for dysuria and urgency. Musculoskeletal: Positive for arthralgias. Negative for back pain and myalgias. Skin: Negative for rash. Allergic/Immunologic: Negative for food allergies. Neurological: Negative for light-headedness and headaches. Hematological: Does not bruise/bleed easily. Psychiatric/Behavioral: Negative for behavioral problems and sleep disturbance. PHYSICAL EXAM  /72   Pulse 65   Temp 98.1 °F (36.7 °C) (Temporal)   Resp 16   Ht 5' (1.524 m)   Wt 149 lb (67.6 kg)   LMP 04/15/2015   SpO2 93%   BMI 29.10 kg/m²   Physical Exam  Vitals signs and nursing note reviewed. Constitutional:       Appearance: She is well-developed. HENT:      Head: Normocephalic and atraumatic. Right Ear: External ear normal.      Left Ear: External ear normal.      Nose: Nose normal.   Eyes:      Conjunctiva/sclera: Conjunctivae normal.   Neck:      Musculoskeletal: Normal range of motion and neck supple. Thyroid: No thyromegaly. Cardiovascular:      Rate and Rhythm: Normal rate and regular rhythm. Heart sounds: Normal heart sounds. No murmur. No friction rub. No gallop. Pulmonary:      Effort: Pulmonary effort is normal.      Breath sounds: Normal breath sounds. No wheezing. Abdominal:      Palpations: Abdomen is soft. There is no mass. Tenderness:  There is no abdominal tenderness. There is no guarding or rebound. Musculoskeletal: Normal range of motion. General: No tenderness. Lymphadenopathy:      Cervical: No cervical adenopathy. Skin:     General: Skin is warm and dry. Findings: No rash. Neurological:      Mental Status: She is alert and oriented to person, place, and time. Deep Tendon Reflexes: Reflexes are normal and symmetric. Psychiatric:         Behavior: Behavior normal.              Laboratory: All laboratory and radiology results have been personally reviewed by myself    Lab Results   Component Value Date     02/14/2020    K 4.5 02/14/2020     02/14/2020    CO2 24 02/14/2020    BUN 16 02/14/2020    CREATININE 1.3 02/14/2020    PROT 7.3 06/12/2019    LABALBU 4.5 06/12/2019    CALCIUM 9.4 02/14/2020    GFRAA 52 02/14/2020    LABGLOM 43 02/14/2020    GLUCOSE 111 02/14/2020    AST 16 06/12/2019    ALT 9 06/12/2019    ALKPHOS 65 06/12/2019    BILITOT 0.6 06/12/2019    VITD25 38 05/09/2017    CHOL 197 02/14/2020    TRIG 81 02/14/2020    HDL 54 02/14/2020    LDLCALC 127 02/14/2020        Lab Results   Component Value Date    CHOL 197 02/14/2020    CHOL 204 (H) 08/16/2018    CHOL 240 (H) 10/16/2017     Lab Results   Component Value Date    TRIG 81 02/14/2020    TRIG 113 08/16/2018    TRIG 125 10/16/2017     Lab Results   Component Value Date    HDL 54 02/14/2020    HDL 45 08/16/2018    HDL 50 10/16/2017     Lab Results   Component Value Date    LDLCALC 127 (H) 02/14/2020    LDLCALC 136 (H) 08/16/2018    LDLCALC 165 (H) 10/16/2017       No results found for: LABA1C  Lab Results   Component Value Date    GLUF 101 (H) 06/12/2019    LABMICR <12.0 09/30/2019    LDLCALC 127 (H) 02/14/2020    CREATININE 1.3 (H) 02/14/2020       ASSESSMENT/PLAN:     Diagnosis Orders   1. CKD (chronic kidney disease) stage 4, GFR 15-29 ml/min ([facility])     2. Osteoarthritis of right shoulder, unspecified osteoarthritis type     3.  Hyperlipidemia, unspecified hyperlipidemia type     4. Insomnia, unspecified type     5. Neural foraminal stenosis of lumbar spine     6. Chest pain, unspecified type  Cardiac Stress Test Exercise - Treadmill   7. Screening for colon cancer  Cologuard (For External Results Only)   8. Screening for malignant neoplasm of breast  PHILL DIGITAL SCREEN BILATERAL PER PROTOCOL     She does not wish for an EKG at this time. EKG reviewed from February and was within normal limits. Will order stress test for further evaluation. Did highly recommend if chest pain develops again recommend ED. She understands. Mammogram ordered as well. Gabapentin refilled. She thinks she did the Cologuard but she is not sure will contact company    Problem list reviewed andsimplified/updated  HM reviewed today and counseled as appropriate    Call or go to ED immediately if symptoms worsen or persist.  No future appointments. Or sooner if necessary. Educational materials and/or homeexercises printed for patient's review and were included in patient instructions on his/her After Visit Summary and given to patient at the end of visit.       Counseled regarding above diagnosis, including possible risks and complications,  especially if left uncontrolled.     Counseled regarding the possible side effects, risks, benefits and alternatives to treatment; patient and/or guardian verbalizes understanding, agrees,feels comfortable with and wishes to proceed with above treatment plan.     Advised patient to call Siobhan Mccracken new medication issues, and read all Rx info from pharmacy to assure aware of all possible risks and side effects of medication before taking.     Reviewed age and gender appropriate health screening exams and vaccinations.   Advised patient regarding importance of keeping up with recommended health maintenance and toschedule as soon as possible if overdue, as this is important in assessing for undiagnosed pathology, especially cancer, as well as protecting against potentially harmful/life threatening disease.          Patient and/or guardian verbalizes understanding and agrees with above counseling, assessment and plan.     All questions answered. Yamini 5, DO  8/20/20    NOTE: This report was transcribed using voice recognition software.  Every effort was made to ensure accuracy; however, inadvertent computerized transcription errors may be present

## 2020-08-20 NOTE — TELEPHONE ENCOUNTER
Pharmacy called they need verification on directions on Gabapentin sent over today for the pt please call [phone]

## 2020-09-17 ENCOUNTER — HOSPITAL ENCOUNTER (OUTPATIENT)
Age: 55
Discharge: HOME | End: 2020-09-17
Payer: COMMERCIAL

## 2020-09-17 LAB
ALBUMIN SERPL-MCNC: 4.5 G/DL (ref 3.5–5.2)
ALP BLD-CCNC: 70 U/L (ref 35–104)
ALT SERPL-CCNC: 18 U/L (ref 0–32)
ANION GAP SERPL CALCULATED.3IONS-SCNC: 10 MMOL/L (ref 7–16)
AST SERPL-CCNC: 23 U/L (ref 0–31)
BILIRUB SERPL-MCNC: 0.6 MG/DL (ref 0–1.2)
BILIRUBIN URINE: NEGATIVE
BLOOD, URINE: NEGATIVE
BUN BLDV-MCNC: 17 MG/DL (ref 6–20)
CALCIUM SERPL-MCNC: 9.8 MG/DL (ref 8.6–10.2)
CHLORIDE BLD-SCNC: 105 MMOL/L (ref 98–107)
CLARITY: CLEAR
CO2: 26 MMOL/L (ref 22–29)
COLOR: YELLOW
CREAT SERPL-MCNC: 1.3 MG/DL (ref 0.5–1)
CREATININE URINE: 81 MG/DL (ref 29–226)
CREATININE URINE: 81 MG/DL (ref 29–226)
GFR AFRICAN AMERICAN: 52
GFR NON-AFRICAN AMERICAN: 43 ML/MIN/1.73
GLUCOSE BLD-MCNC: 127 MG/DL (ref 74–99)
GLUCOSE URINE: NEGATIVE MG/DL
HCT VFR BLD CALC: 46.1 % (ref 34–48)
HEMOGLOBIN: 15.5 G/DL (ref 11.5–15.5)
KETONES, URINE: NEGATIVE MG/DL
LEUKOCYTE ESTERASE, URINE: NEGATIVE
MCH RBC QN AUTO: 29.3 PG (ref 26–35)
MCHC RBC AUTO-ENTMCNC: 33.6 % (ref 32–34.5)
MCV RBC AUTO: 87.1 FL (ref 80–99.9)
MICROALBUMIN UR-MCNC: <12 MG/L
MICROALBUMIN/CREAT UR-RTO: ABNORMAL (ref 0–30)
NITRITE, URINE: NEGATIVE
PARATHYROID HORMONE INTACT: 48 PG/ML (ref 15–65)
PDW BLD-RTO: 12.5 FL (ref 11.5–15)
PH UA: 6 (ref 5–9)
PHOSPHORUS: 3.5 MG/DL (ref 2.5–4.5)
PLATELET # BLD: 142 E9/L (ref 130–450)
PMV BLD AUTO: 11.5 FL (ref 7–12)
POTASSIUM SERPL-SCNC: 5 MMOL/L (ref 3.5–5)
PROTEIN PROTEIN: 4 MG/DL (ref 0–12)
PROTEIN UA: NEGATIVE MG/DL
PROTEIN/CREAT RATIO: 0
PROTEIN/CREAT RATIO: 0 (ref 0–0.2)
RBC # BLD: 5.29 E12/L (ref 3.5–5.5)
SODIUM BLD-SCNC: 141 MMOL/L (ref 132–146)
SPECIFIC GRAVITY UA: 1.02 (ref 1–1.03)
TOTAL PROTEIN: 7.6 G/DL (ref 6.4–8.3)
UROBILINOGEN, URINE: 0.2 E.U./DL
VITAMIN D 25-HYDROXY: 53 NG/ML (ref 30–100)
WBC # BLD: 4.7 E9/L (ref 4.5–11.5)

## 2020-09-17 PROCEDURE — 82044 UR ALBUMIN SEMIQUANTITATIVE: CPT

## 2020-09-17 PROCEDURE — 36415 COLL VENOUS BLD VENIPUNCTURE: CPT

## 2020-09-17 PROCEDURE — 81003 URINALYSIS AUTO W/O SCOPE: CPT

## 2020-09-17 PROCEDURE — 80053 COMPREHEN METABOLIC PANEL: CPT

## 2020-09-17 PROCEDURE — 85027 COMPLETE CBC AUTOMATED: CPT

## 2020-09-17 PROCEDURE — 84156 ASSAY OF PROTEIN URINE: CPT

## 2020-09-17 PROCEDURE — 84100 ASSAY OF PHOSPHORUS: CPT

## 2020-09-17 PROCEDURE — 82306 VITAMIN D 25 HYDROXY: CPT

## 2020-09-17 PROCEDURE — 82570 ASSAY OF URINE CREATININE: CPT

## 2020-09-17 PROCEDURE — 83970 ASSAY OF PARATHORMONE: CPT

## 2020-10-20 ENCOUNTER — TELEPHONE (OUTPATIENT)
Dept: FAMILY MEDICINE CLINIC | Age: 55
End: 2020-10-20

## 2020-10-20 NOTE — TELEPHONE ENCOUNTER
Melburn Felty from [address] called in regards to a Stress Test that is scheduled for patient on Thursday that is needing a new dx code I 25.9 will not work. Please send a new order for a regular Stress  Test with a new Dx .  Fax to [phone]

## 2020-10-20 NOTE — TELEPHONE ENCOUNTER
Mahsa Ragland from [facility] calling and asking for dx code on stress test be changed. The one on order is not covered.  Please fax new referral to [phone]

## 2020-10-22 ENCOUNTER — HOSPITAL ENCOUNTER (OUTPATIENT)
Dept: CARDIOLOGY | Age: 55
Discharge: HOME | End: 2020-10-22
Payer: COMMERCIAL

## 2020-10-22 VITALS
SYSTOLIC BLOOD PRESSURE: 114 MMHG | DIASTOLIC BLOOD PRESSURE: 60 MMHG | HEIGHT: 60 IN | WEIGHT: 149 LBS | TEMPERATURE: 96.9 F | HEART RATE: 83 BPM | BODY MASS INDEX: 29.25 KG/M2

## 2020-10-22 PROCEDURE — 93017 CV STRESS TEST TRACING ONLY: CPT

## 2020-10-22 NOTE — PROCEDURES
[address] [facility] - [address]  [phone]    Exercise Stress Study (non-nuclear)      Name: [facility] Account Number:  [de-identified]      :  1965          Sex: female         Date of Study:  10/22/2020    Height: 5' (152.4 cm)          Weight: 149 lb (67.6 kg)    Ordering Provider: Rekha Kan DO          PCP: Rekha Kan DO    Cardiologist: none              Interpreting Physician: Sylvie Rodriguez MD    Indication:   Detecting the presence and location of coronary artery disease    Clinical History:   Patient has no known history of coronary artery disease. Resting ECG:    NSR 83 bpm.  Normal axis/intervals. No ST/T changes. Exercise: The patient exercised using a Clarence protocol, completing 9:21 minutes and reaching an estimated work load of 87.9 metabolic equivalents (METS). Resting HR was 83. Peak exercise heart rate was 136 (81% of maximum predicted heart rate for age). Baseline /60. Peak exercise /74. The blood pressure response to exercise was normal      Exercise was terminated due to patient request, low back pain. The patient experienced no chest pain with exercise. Exercise ECG:   The patient demonstrated rare PVC during exercise. With exercise, there were no ST segment changes of significance at the heart rate achieved. Pittman treadmill score was +9.5 implying low risk. Impression:    1. Exercise EKG was negative for ischemia at submaximal workload (81% MPHR). 2. The patient experienced no chest pain with exercise. 3. Pittman treadmill score was +9.5 implying low risk. 4. Exercise capacity was above average. 5. Low risk general exercise treadmill test with the caveat of not achieving 85% MPHR. Thank you for sending your patient to this [employer].     Electronically signed by Sylvie Rodriguez MD on

## 2020-10-30 RX ORDER — TRAZODONE HYDROCHLORIDE 150 MG/1
TABLET ORAL
Qty: 90 TABLET | Refills: 0 | Status: SHIPPED
Start: 2020-10-30 | End: 2021-01-29 | Stop reason: SDUPTHER

## 2021-01-29 RX ORDER — TRAZODONE HYDROCHLORIDE 150 MG/1
TABLET ORAL
Qty: 90 TABLET | Refills: 1 | Status: SHIPPED
Start: 2021-01-29 | End: 2021-07-22 | Stop reason: SDUPTHER

## 2021-02-11 ENCOUNTER — OFFICE VISIT (OUTPATIENT)
Dept: FAMILY MEDICINE CLINIC | Age: 56
End: 2021-02-11
Payer: COMMERCIAL

## 2021-02-11 VITALS
TEMPERATURE: 97.7 F | DIASTOLIC BLOOD PRESSURE: 62 MMHG | WEIGHT: 158.8 LBS | RESPIRATION RATE: 18 BRPM | SYSTOLIC BLOOD PRESSURE: 94 MMHG | HEART RATE: 76 BPM | HEIGHT: 60 IN | BODY MASS INDEX: 31.18 KG/M2 | OXYGEN SATURATION: 96 %

## 2021-02-11 DIAGNOSIS — Z12.31 ENCOUNTER FOR SCREENING MAMMOGRAM FOR MALIGNANT NEOPLASM OF BREAST: ICD-10-CM

## 2021-02-11 DIAGNOSIS — N18.4 CKD (CHRONIC KIDNEY DISEASE) STAGE 4, GFR 15-29 ML/MIN (HCC): ICD-10-CM

## 2021-02-11 DIAGNOSIS — E78.5 HYPERLIPIDEMIA, UNSPECIFIED HYPERLIPIDEMIA TYPE: ICD-10-CM

## 2021-02-11 DIAGNOSIS — E89.2 POSTPROCEDURAL HYPOPARATHYROIDISM (HCC): ICD-10-CM

## 2021-02-11 DIAGNOSIS — M79.602 LEFT ARM PAIN: Primary | ICD-10-CM

## 2021-02-11 DIAGNOSIS — Z12.31 ENCOUNTER FOR MAMMOGRAM TO ESTABLISH BASELINE MAMMOGRAM: ICD-10-CM

## 2021-02-11 PROCEDURE — G8417 CALC BMI ABV UP PARAM F/U: HCPCS | Performed by: FAMILY MEDICINE

## 2021-02-11 PROCEDURE — 3017F COLORECTAL CA SCREEN DOC REV: CPT | Performed by: FAMILY MEDICINE

## 2021-02-11 PROCEDURE — 99214 OFFICE O/P EST MOD 30 MIN: CPT | Performed by: FAMILY MEDICINE

## 2021-02-11 PROCEDURE — G8427 DOCREV CUR MEDS BY ELIG CLIN: HCPCS | Performed by: FAMILY MEDICINE

## 2021-02-11 PROCEDURE — 4004F PT TOBACCO SCREEN RCVD TLK: CPT | Performed by: FAMILY MEDICINE

## 2021-02-11 PROCEDURE — G8484 FLU IMMUNIZE NO ADMIN: HCPCS | Performed by: FAMILY MEDICINE

## 2021-02-11 RX ORDER — GABAPENTIN 600 MG/1
TABLET ORAL
Qty: 180 TABLET | Refills: 1 | Status: SHIPPED
Start: 2021-02-11 | End: 2021-07-22 | Stop reason: SDUPTHER

## 2021-02-11 ASSESSMENT — ENCOUNTER SYMPTOMS
CONSTIPATION: 0
DIARRHEA: 0
SHORTNESS OF BREATH: 0
SINUS PRESSURE: 0
EYE PAIN: 0
COUGH: 0
BACK PAIN: 0
ABDOMINAL PAIN: 0
SORE THROAT: 0

## 2021-02-11 ASSESSMENT — PATIENT HEALTH QUESTIONNAIRE - PHQ9
SUM OF ALL RESPONSES TO PHQ QUESTIONS 1-9: 0
SUM OF ALL RESPONSES TO PHQ QUESTIONS 1-9: 0

## 2021-02-11 NOTE — PROGRESS NOTES
Rimma Thomas  : 1965    Chief Complaint:     Chief Complaint   Patient presents with    Arm Pain     left bicept pain [id number]       HPI  Rimma Thomas 48 y.o. presents for   Chief Complaint   Patient presents with    Arm Pain     left bicept pain [id number]     Patient presents for left arm pain that has been going on for the past 2 months. Patient states she fell and developed the pain. She states she is able to have good range of motion however the pain is mainly located in her lower bicep into her elbow. She has no pain on her shoulder. She denies any weakness or numbness or tingling. She does follow with nephrology for CKD. She does have a history of hyperlipidemia not currently on medication. Gabapentin is controlling her neuropathy for the most part. She is due for a mammogram    All questions were answered to patients satisfaction. Past Medical History:   Diagnosis Date    Chronic back pain     Chronic kidney disease     stage 3       Past Surgical History:   Procedure Laterality Date    CARPAL TUNNEL RELEASE      two carpal tunnel surgeries    CHOLECYSTECTOMY      GALLBLADDER SURGERY      LUMBAR FUSION  2013    PLIF  L5 - S1    NOSE SURGERY      four nose surgeries    PARATHYROIDECTOMY Right 2018    ROTATOR CUFF REPAIR      SHOULDER ARTHROSCOPY Right 2020    SHOULDER ARTHROSCOPY Right 2020    RIGHT SHOULDER ARTHROSCOPY. SUBACROMIAL DECOMPRESSION, LABRAL AND BICEP  DEBRIDEMENT, ROTATOR CUFF REPAIR performed by Tim Newell DO at [address] History     Socioeconomic History    Marital status:       Spouse name: None    Number of children: None    Years of education: None    Highest education level: None   Occupational History    None   Social Needs    Financial resource strain: None    Food insecurity     Worry: None     Inability: None    Transportation needs     Medical: None Non-medical: None   Tobacco Use    Smoking status: Current Every Day Smoker     Packs/day: 0.50     Years: 10.00     Pack years: 5.00     Types: Cigarettes    Smokeless tobacco: Never Used    Tobacco comment: quit smoking  11/2012   Substance and Sexual Activity    Alcohol use: No    Drug use: No    Sexual activity: None   Lifestyle    Physical activity     Days per week: None     Minutes per session: None    Stress: None   Relationships    Social connections     Talks on phone: None     Gets together: None     Attends Shinto service: None     Active member of club or organization: None     Attends meetings of clubs or organizations: None     Relationship status: None    Intimate partner violence     Fear of current or ex partner: None     Emotionally abused: None     Physically abused: None     Forced sexual activity: None   Other Topics Concern    None   Social History Narrative    None       Family History   Problem Relation Age of Onset    Heart Disease Mother     Arthritis Mother     Diabetes Mother     High Blood Pressure Mother     Kidney Disease Mother     Heart Disease Father     High Blood Pressure Father     Arthritis Father     Stroke Father     Diabetes Sister     Arthritis Maternal Uncle     Diabetes Maternal Uncle     Kidney Disease Maternal Uncle     Arthritis Maternal Grandmother           Current Outpatient Medications   Medication Sig Dispense Refill    gabapentin (NEURONTIN) 600 MG tablet TAKE 1 TABLET BY MOUTH 2 TIMES DAILY AS NEEDED FOR PAIN 180 tablet 1    traZODone (DESYREL) 150 MG tablet Take one tab po nightly 90 tablet 1     No current facility-administered medications for this visit.         Allergies   Allergen Reactions    Penicillin G Hives and Nausea And Vomiting       Health Maintenance Due   Topic Date Due    Pneumococcal 0-64 years Vaccine (1 of 3 - PCV13) 12/29/1972    Cervical cancer screen  12/29/1987    Shingles Vaccine (1 of 2) 12/29/2016  Breast cancer screen  08/30/2020    Flu vaccine (1) 09/01/2020           REVIEW OF SYSTEMS  Review of Systems   Constitutional: Negative for fatigue and fever. HENT: Negative for ear pain, sinus pressure, sneezing and sore throat. Eyes: Negative for pain. Respiratory: Negative for cough and shortness of breath. Cardiovascular: Negative for chest pain and leg swelling. Gastrointestinal: Negative for abdominal pain, constipation and diarrhea. Genitourinary: Negative for dysuria and urgency. Musculoskeletal: Positive for arthralgias. Negative for back pain and myalgias. Left arm pain   Skin: Negative for rash. Allergic/Immunologic: Negative for food allergies. Neurological: Negative for light-headedness and headaches. Hematological: Does not bruise/bleed easily. Psychiatric/Behavioral: Negative for behavioral problems and sleep disturbance. PHYSICAL EXAM  BP 94/62   Pulse 76   Temp 97.7 °F (36.5 °C)   Resp 18   Ht 5' (1.524 m)   Wt 158 lb 12.8 oz (72 kg)   LMP 04/15/2015   SpO2 96%   BMI 31.01 kg/m²   Physical Exam  Vitals signs and nursing note reviewed. Constitutional:       Appearance: She is well-developed. HENT:      Head: Normocephalic and atraumatic. Right Ear: External ear normal.      Left Ear: External ear normal.      Nose: Nose normal.   Eyes:      Conjunctiva/sclera: Conjunctivae normal.   Neck:      Musculoskeletal: Normal range of motion and neck supple. Thyroid: No thyromegaly. Cardiovascular:      Rate and Rhythm: Normal rate and regular rhythm. Heart sounds: Normal heart sounds. No murmur. Pulmonary:      Effort: Pulmonary effort is normal.      Breath sounds: Normal breath sounds. No wheezing. Abdominal:      Palpations: Abdomen is soft. Tenderness: There is no abdominal tenderness. Musculoskeletal: Normal range of motion. General: Tenderness (Lower bicep into the left elbow. Range of motion is intact. Neer's is positive. Speed negative) present. Lymphadenopathy:      Cervical: No cervical adenopathy. Skin:     General: Skin is warm and dry. Findings: No rash. Neurological:      General: No focal deficit present. Mental Status: She is alert and oriented to person, place, and time. Mental status is at baseline. Deep Tendon Reflexes: Reflexes are normal and symmetric. Psychiatric:         Behavior: Behavior normal.              Laboratory: All laboratory and radiology results have been personally reviewed by myself    Lab Results   Component Value Date     09/17/2020    K 5.0 09/17/2020     09/17/2020    CO2 26 09/17/2020    BUN 17 09/17/2020    CREATININE 1.3 09/17/2020    PROT 7.6 09/17/2020    LABALBU 4.5 09/17/2020    CALCIUM 9.8 09/17/2020    GFRAA 52 09/17/2020    LABGLOM 43 09/17/2020    GLUCOSE 127 09/17/2020    AST 23 09/17/2020    ALT 18 09/17/2020    ALKPHOS 70 09/17/2020    BILITOT 0.6 09/17/2020    VITD25 53 09/17/2020    CHOL 197 02/14/2020    TRIG 81 02/14/2020    HDL 54 02/14/2020    LDLCALC 127 02/14/2020        Lab Results   Component Value Date    CHOL 197 02/14/2020    CHOL 204 (H) 08/16/2018    CHOL 240 (H) 10/16/2017     Lab Results   Component Value Date    TRIG 81 02/14/2020    TRIG 113 08/16/2018    TRIG 125 10/16/2017     Lab Results   Component Value Date    HDL 54 02/14/2020    HDL 45 08/16/2018    HDL 50 10/16/2017     Lab Results   Component Value Date    LDLCALC 127 (H) 02/14/2020    LDLCALC 136 (H) 08/16/2018    LDLCALC 165 (H) 10/16/2017       No results found for: LABA1C  Lab Results   Component Value Date    GLUF 101 (H) 06/12/2019    LABMICR <12.0 09/17/2020    LDLCALC 127 (H) 02/14/2020    CREATININE 1.3 (H) 09/17/2020       ASSESSMENT/PLAN:     Diagnosis Orders   1.  Left arm pain  [address], Sanaz Perry, DO, Orthopaedics and Sports Medicine, [facility] Reviewed age and gender appropriate health screening exams and vaccinations. Advised patient regarding importance of keeping up with recommended health maintenance and toschedule as soon as possible if overdue, as this is important in assessing for undiagnosed pathology, especially cancer, as well as protecting against potentially harmful/life threatening disease.          Patient and/or guardian verbalizes understanding and agrees with above counseling, assessment and plan.     All questions answered. Yamini 5, DO  2/11/21    NOTE: This report was transcribed using voice recognition software.  Every effort was made to ensure accuracy; however, inadvertent computerized transcription errors may be present

## 2021-02-23 ENCOUNTER — OFFICE VISIT (OUTPATIENT)
Dept: ORTHOPEDIC SURGERY | Age: 56
End: 2021-02-23
Payer: COMMERCIAL

## 2021-02-23 VITALS — WEIGHT: 158 LBS | BODY MASS INDEX: 31.02 KG/M2 | HEIGHT: 60 IN

## 2021-02-23 DIAGNOSIS — S46.212A BICEPS MUSCLE STRAIN, LEFT, INITIAL ENCOUNTER: Primary | ICD-10-CM

## 2021-02-23 PROCEDURE — 4004F PT TOBACCO SCREEN RCVD TLK: CPT | Performed by: ORTHOPAEDIC SURGERY

## 2021-02-23 PROCEDURE — 3017F COLORECTAL CA SCREEN DOC REV: CPT | Performed by: ORTHOPAEDIC SURGERY

## 2021-02-23 PROCEDURE — G8417 CALC BMI ABV UP PARAM F/U: HCPCS | Performed by: ORTHOPAEDIC SURGERY

## 2021-02-23 PROCEDURE — G8484 FLU IMMUNIZE NO ADMIN: HCPCS | Performed by: ORTHOPAEDIC SURGERY

## 2021-02-23 PROCEDURE — G8427 DOCREV CUR MEDS BY ELIG CLIN: HCPCS | Performed by: ORTHOPAEDIC SURGERY

## 2021-02-23 PROCEDURE — 99214 OFFICE O/P EST MOD 30 MIN: CPT | Performed by: ORTHOPAEDIC SURGERY

## 2021-02-23 NOTE — PROGRESS NOTES
children: Not on file    Years of education: Not on file    Highest education level: Not on file   Occupational History    Not on file   Social Needs    Financial resource strain: Not on file    Food insecurity     Worry: Not on file     Inability: Not on file    Transportation needs     Medical: Not on file     Non-medical: Not on file   Tobacco Use    Smoking status: Current Every Day Smoker     Packs/day: 0.50     Years: 10.00     Pack years: 5.00     Types: Cigarettes    Smokeless tobacco: Never Used    Tobacco comment: quit smoking  11/2012   Substance and Sexual Activity    Alcohol use: No    Drug use: No    Sexual activity: Not on file   Lifestyle    Physical activity     Days per week: Not on file     Minutes per session: Not on file    Stress: Not on file   Relationships    Social connections     Talks on phone: Not on file     Gets together: Not on file     Attends Hinduism service: Not on file     Active member of club or organization: Not on file     Attends meetings of clubs or organizations: Not on file     Relationship status: Not on file    Intimate partner violence     Fear of current or ex partner: Not on file     Emotionally abused: Not on file     Physically abused: Not on file     Forced sexual activity: Not on file   Other Topics Concern    Not on file   Social History Narrative    Not on file     Family History   Problem Relation Age of Onset    Heart Disease Mother     Arthritis Mother     Diabetes Mother     High Blood Pressure Mother     Kidney Disease Mother     Heart Disease Father     High Blood Pressure Father     Arthritis Father     Stroke Father     Diabetes Sister     Arthritis Maternal Uncle     Diabetes Maternal Uncle     Kidney Disease Maternal Uncle     Arthritis Maternal Grandmother        REVIEW OF SYSTEMS:     General/Constitution:  (-)weight loss, (-)fever, (-)chills, (-)weakness. Skin: (-) rash,(-) psoriasis,(-) eczema, (-)skin cancer. Musculoskeletal: (-) fractures,  (-) dislocations,(-) collagen vascular disease, (-) fibromyalgia, (-) multiple sclerosis, (-) muscular dystrophy, (-) RSD,(-) joint pain (-)swelling, (-) joint pain,swelling. Neurologic: (-) epilepsy, (-)seizures,(-) brain tumor,(-) TIA, (-)stroke, (-)headaches, (-)Parkinson disease,(-) memory loss, (-) LOC. Cardiovascular: (-) Chest pain, (-) swelling in legs/feet, (-) SOB, (-) cramping in legs/feet with walking. Respiratory: (-) SOB, (-) Coughing, (-) night sweats. GI: (-) nausea, (-) vomiting, (-) diarrhea, (-) blood in stool, (-) gastric ulcer. Psychiatric: (-) Depression, (-) Anxiety, (-) bipolar disease, (-) Alzheimer's Disease  Allergic/Immunologic: (-) allergies latex, (-) allergies metal, (-) skin sensitivity. Hematlogic: (-) anemia, (-) blood transfusion, (-) DVT/PE, (-) Clotting disorders      Subjective:    Constitution:  Ht 5' (1.524 m)   Wt 158 lb (71.7 kg)   LMP 04/15/2015   BMI 30.86 kg/m²     Psycihatric:  The patient is alert and oriented x 3, appears to be stated age and in no distress. Respiratory:  Respiratory effort is not labored. Patient is not gasping. Palpation of the chest reveals no tactile fremitus. Skin:  Upon inspection: the skin appears warm, dry and intact. There is not a previous scar over the affected area. There is not any cellulitis, lymphedema or cutaneous lesions noted in the lower extremities. Upon palpation there is no induration noted. Neurologic:  Motor exam of the upper extremities show: The reflexes in biceps/triceps/brachioradialis are equal and symmetric. Sensory exam C5-T1 are normal bilaterally. .        Cardiovascular: The vascular exam is normal and is well perfused to distal extremities. There are 2+ radial pulses bilaterally, and motor and sensation is intact to median, ulnar, and radial, musclocutaneus, and axillary nerve distribution and grossly symmetric bilaterally.   There is cap refill noted less than two seconds in all digits. There is not edema of the bilateral upper extremities. There is not varicosities noted in the distal extremities. Lymph:  Upon palpation,  there is no lymphadenopathy noted in bilateral upper extremities. Musculoskeletal:  Gait: normal; examination of the nails and digits reveal no cyanosis or clubbing. Cervical Exam:  On physical exam, Naldo Sanchez is well-developed, well-nourished, oriented to person, place and time. her gait is normal.  On evaluation of hercervical spine, She has full range of motion of the cervical spine without pain. There is no cervical tenderness to palpation. Shoulder Exam:  On evaluation of her bilaterally upper extremities, her left shoulder has no deformity. There is tenderness upon palpation of the bicep muscle. There is not evidence of scapular dyskinesis. There is not muscle atrophy in shoulder girdle. The range of motion for the Right Shoulder is 150/50/t8 and for the Left shoulder is 140/45/t10. Right shoulder Motor strength is 5/5 in the supraspinatus, 5/5 internal rotation and 5/5 in external rotation, and Left shoulder motor strength 5/5 in supraspinatus, 5/5 in internal rotation, 5/5 in external rotation. Right shoulder:  negative Impingement , negative Whitley ,negative  Speeds,negative  Apprehension ,negative Dean Load Shift, negative Binta manuver, negative Cross arm test.     Left shoulder:  negative Impingement , negative Whitley ,negative  Speeds,negative  Apprehension ,negative Dean Load Shift, negative Binta manuver, negative Cross arm test.     XRAY:  n/a    MRI:  n/a    Radiographic findings reviewed with patient    Impression:   Encounter Diagnosis   Name Primary?  Biceps muscle strain, left, initial encounter Yes       Plan: Natural history and expected course discussed. Questions answered. Educational material distributed. Reduction in offending activity. Gentle ROM exercises  RICE therapy. Conservative treatment  Josefina pain cream  No nsaids due to stage 3 kidney disease  Light activity 2-3 weeks  HEP in about 2-3 weeks  FU in 4 weeks

## 2021-03-11 ENCOUNTER — HOSPITAL ENCOUNTER (OUTPATIENT)
Age: 56
Discharge: HOME | End: 2021-03-11
Payer: COMMERCIAL

## 2021-03-11 DIAGNOSIS — E78.5 HYPERLIPIDEMIA, UNSPECIFIED HYPERLIPIDEMIA TYPE: ICD-10-CM

## 2021-03-11 LAB
ANION GAP SERPL CALCULATED.3IONS-SCNC: 9 MMOL/L (ref 7–16)
BUN BLDV-MCNC: 17 MG/DL (ref 6–20)
CALCIUM SERPL-MCNC: 9.9 MG/DL (ref 8.6–10.2)
CHLORIDE BLD-SCNC: 104 MMOL/L (ref 98–107)
CHOLESTEROL, TOTAL: 229 MG/DL (ref 0–199)
CO2: 28 MMOL/L (ref 22–29)
CREAT SERPL-MCNC: 1.4 MG/DL (ref 0.5–1)
GFR AFRICAN AMERICAN: 47
GFR NON-AFRICAN AMERICAN: 39 ML/MIN/1.73
GLUCOSE BLD-MCNC: 105 MG/DL (ref 74–99)
HDLC SERPL-MCNC: 48 MG/DL
LDL CHOLESTEROL CALCULATED: 166 MG/DL (ref 0–99)
POTASSIUM SERPL-SCNC: 4.7 MMOL/L (ref 3.5–5)
SODIUM BLD-SCNC: 141 MMOL/L (ref 132–146)
TRIGL SERPL-MCNC: 75 MG/DL (ref 0–149)
VLDLC SERPL CALC-MCNC: 15 MG/DL

## 2021-03-11 PROCEDURE — 36415 COLL VENOUS BLD VENIPUNCTURE: CPT

## 2021-03-11 PROCEDURE — 80061 LIPID PANEL: CPT

## 2021-03-11 PROCEDURE — 80048 BASIC METABOLIC PNL TOTAL CA: CPT

## 2021-03-18 ENCOUNTER — OFFICE VISIT (OUTPATIENT)
Dept: ORTHOPEDIC SURGERY | Age: 56
End: 2021-03-18
Payer: COMMERCIAL

## 2021-03-18 VITALS — HEIGHT: 60 IN | WEIGHT: 150 LBS | BODY MASS INDEX: 29.45 KG/M2

## 2021-03-18 DIAGNOSIS — M75.22 BICEPS TENDINITIS OF LEFT UPPER EXTREMITY: Primary | ICD-10-CM

## 2021-03-18 PROCEDURE — G8427 DOCREV CUR MEDS BY ELIG CLIN: HCPCS | Performed by: NURSE PRACTITIONER

## 2021-03-18 PROCEDURE — 20550 NJX 1 TENDON SHEATH/LIGAMENT: CPT | Performed by: NURSE PRACTITIONER

## 2021-03-18 PROCEDURE — G8484 FLU IMMUNIZE NO ADMIN: HCPCS | Performed by: NURSE PRACTITIONER

## 2021-03-18 PROCEDURE — 99213 OFFICE O/P EST LOW 20 MIN: CPT | Performed by: NURSE PRACTITIONER

## 2021-03-18 PROCEDURE — 76942 ECHO GUIDE FOR BIOPSY: CPT | Performed by: NURSE PRACTITIONER

## 2021-03-18 PROCEDURE — 3017F COLORECTAL CA SCREEN DOC REV: CPT | Performed by: NURSE PRACTITIONER

## 2021-03-18 PROCEDURE — 4004F PT TOBACCO SCREEN RCVD TLK: CPT | Performed by: NURSE PRACTITIONER

## 2021-03-18 PROCEDURE — G8417 CALC BMI ABV UP PARAM F/U: HCPCS | Performed by: NURSE PRACTITIONER

## 2021-03-18 RX ORDER — TRIAMCINOLONE ACETONIDE 40 MG/ML
40 INJECTION, SUSPENSION INTRA-ARTICULAR; INTRAMUSCULAR ONCE
Status: COMPLETED | OUTPATIENT
Start: 2021-03-18 | End: 2021-03-18

## 2021-03-18 RX ADMIN — TRIAMCINOLONE ACETONIDE 40 MG: 40 INJECTION, SUSPENSION INTRA-ARTICULAR; INTRAMUSCULAR at 09:50

## 2021-03-18 NOTE — PROGRESS NOTES
Chief Complaint   Patient presents with    Follow-up     patient here for left bicep pain. Joel Madsen is a 48y.o. year old   female who is seen today  for follow up of left shoulder pain. She reports the pain has been ongoing for the past 3 months. She has been applying vickie pain cream. She is unable to take nsaids due to her kidneys. She reports she is no better today then she was 4 weeks ago. Chief Complaint   Patient presents with    Follow-up     patient here for left bicep pain. Past Medical History:   Diagnosis Date    Chronic back pain     Chronic kidney disease     stage 3     Past Surgical History:   Procedure Laterality Date    CARPAL TUNNEL RELEASE      two carpal tunnel surgeries    CHOLECYSTECTOMY      GALLBLADDER SURGERY      LUMBAR FUSION  2/1/2013    PLIF  L5 - S1    NOSE SURGERY      four nose surgeries    PARATHYROIDECTOMY Right 02/09/2018    ROTATOR CUFF REPAIR      SHOULDER ARTHROSCOPY Right 02/21/2020    SHOULDER ARTHROSCOPY Right 2/21/2020    RIGHT SHOULDER ARTHROSCOPY. SUBACROMIAL DECOMPRESSION, LABRAL AND BICEP  DEBRIDEMENT, ROTATOR CUFF REPAIR performed by Tammy Hartley DO at [address]         Current Outpatient Medications:     gabapentin (NEURONTIN) 600 MG tablet, TAKE 1 TABLET BY MOUTH 2 TIMES DAILY AS NEEDED FOR PAIN, Disp: 180 tablet, Rfl: 1    traZODone (DESYREL) 150 MG tablet, Take one tab po nightly, Disp: 90 tablet, Rfl: 1  Allergies   Allergen Reactions    Penicillin G Hives and Nausea And Vomiting     Social History     Socioeconomic History    Marital status:       Spouse name: Not on file    Number of children: Not on file    Years of education: Not on file    Highest education level: Not on file   Occupational History    Not on file   Social Needs    Financial resource strain: Not on file    Food insecurity     Worry: Not on file     Inability: Not on file   [place] needs Medical: Not on file     Non-medical: Not on file   Tobacco Use    Smoking status: Current Every Day Smoker     Packs/day: 0.50     Years: 10.00     Pack years: 5.00     Types: Cigarettes    Smokeless tobacco: Never Used    Tobacco comment: quit smoking  11/2012   Substance and Sexual Activity    Alcohol use: No    Drug use: No    Sexual activity: Not on file   Lifestyle    Physical activity     Days per week: Not on file     Minutes per session: Not on file    Stress: Not on file   Relationships    Social connections     Talks on phone: Not on file     Gets together: Not on file     Attends Mosque service: Not on file     Active member of club or organization: Not on file     Attends meetings of clubs or organizations: Not on file     Relationship status: Not on file    Intimate partner violence     Fear of current or ex partner: Not on file     Emotionally abused: Not on file     Physically abused: Not on file     Forced sexual activity: Not on file   Other Topics Concern    Not on file   Social History Narrative    Not on file     Family History   Problem Relation Age of Onset    Heart Disease Mother     Arthritis Mother     Diabetes Mother     High Blood Pressure Mother     Kidney Disease Mother     Heart Disease Father     High Blood Pressure Father     Arthritis Father     Stroke Father     Diabetes Sister     Arthritis Maternal Uncle     Diabetes Maternal Uncle     Kidney Disease Maternal Uncle     Arthritis Maternal Grandmother        REVIEW OF SYSTEMS:     General/Constitution:  (-)weight loss, (-)fever, (-)chills, (-)weakness. Skin: (-) rash,(-) psoriasis,(-) eczema, (-)skin cancer. Musculoskeletal: (-) fractures,  (-) dislocations,(-) collagen vascular disease, (-) fibromyalgia, (-) multiple sclerosis, (-) muscular dystrophy, (-) RSD,(-) joint pain (-)swelling, (-) joint pain,swelling.   Neurologic: (-) epilepsy, (-)seizures,(-) brain tumor,(-) TIA, (-)stroke, (-)headaches, (-)Parkinson disease,(-) memory loss, (-) LOC. Cardiovascular: (-) Chest pain, (-) swelling in legs/feet, (-) SOB, (-) cramping in legs/feet with walking. Respiratory: (-) SOB, (-) Coughing, (-) night sweats. GI: (-) nausea, (-) vomiting, (-) diarrhea, (-) blood in stool, (-) gastric ulcer. Psychiatric: (-) Depression, (-) Anxiety, (-) bipolar disease, (-) Alzheimer's Disease  Allergic/Immunologic: (-) allergies latex, (-) allergies metal, (-) skin sensitivity. Hematlogic: (-) anemia, (-) blood transfusion, (-) DVT/PE, (-) Clotting disorders      Subjective:    Constitution:  Ht 5' (1.524 m)   Wt 150 lb (68 kg)   LMP 04/15/2015   BMI 29.29 kg/m²     Psycihatric:  The patient is alert and oriented x 3, appears to be stated age and in no distress. Respiratory:  Respiratory effort is not labored. Patient is not gasping. Palpation of the chest reveals no tactile fremitus. Skin:  Upon inspection: the skin appears warm, dry and intact. There is not a previous scar over the affected area. There is not any cellulitis, lymphedema or cutaneous lesions noted in the lower extremities. Upon palpation there is no induration noted. Neurologic:  Motor exam of the upper extremities show: The reflexes in biceps/triceps/brachioradialis are equal and symmetric. Sensory exam C5-T1 are normal bilaterally. .        Cardiovascular: The vascular exam is normal and is well perfused to distal extremities. There are 2+ radial pulses bilaterally, and motor and sensation is intact to median, ulnar, and radial, musclocutaneus, and axillary nerve distribution and grossly symmetric bilaterally. There is cap refill noted less than two seconds in all digits. There is not edema of the bilateral upper extremities. There is not varicosities noted in the distal extremities. Lymph:  Upon palpation,  there is no lymphadenopathy noted in bilateral upper extremities.       Musculoskeletal:  Gait: normal; examination of the nails and digits reveal no cyanosis or clubbing. Cervical Exam:  On physical exam, Allison Borrero is well-developed, well-nourished, oriented to person, place and time. her gait is normal.  On evaluation of hercervical spine, She has full range of motion of the cervical spine without pain. There is no cervical tenderness to palpation. Shoulder Exam:  On evaluation of her bilaterally upper extremities, her left shoulder has no deformity. There is tenderness upon palpation of the bicep tendon and anterior shoulder. There is not evidence of scapular dyskinesis. There is not muscle atrophy in shoulder girdle. The range of motion for the Right Shoulder is 150/50/t8 and for the Left shoulder is 140/45/t10. Right shoulder Motor strength is 5/5 in the supraspinatus, 5/5 internal rotation and 5/5 in external rotation, and Left shoulder motor strength 5/5 in supraspinatus, 5/5 in internal rotation, 5/5 in external rotation. Right shoulder:  negative Impingement , negative Whitley ,negative  Speeds,negative  Apprehension ,negative Dean Load Shift, negative Binta manuver, negative Cross arm test.     Left shoulder:  negative Impingement , negative Whitley ,positiveSpeeds,negative  Apprehension ,negative Dean Load Shift, negative Binta manuver, negative Cross arm test.     XRAY:  n/a    MRI:  n/a    Radiographic findings reviewed with patient    Impression:   Encounter Diagnosis   Name Primary?  Biceps tendinitis of left upper extremity Yes       Plan: Natural history and expected course discussed. Questions answered. Educational material distributed. Reduction in offending activity. Gentle ROM exercises  RICE therapy. Conservative treatment  Josefina pain cream  No nsaids due to stage 3 kidney disease  Verbal and written consent was obtained for the injection. I will proceed with a cortisone injection in the Left shoulder using ultrasound guidance for accuracy of injection.  A [employer] ultrasound unit with a variable frequency linear transducer was used for this procedure. Ethyl chloride vapocoolant spray was applied. The ultrasound unit was used to identify the bicep tendon. The skin was preped with alcohol, and using ultrasound guidance and a no touch, aseptic technique, a 25 gauge, 1.5\" needle was inserted, confirmation of needle placement was noted on the ultrasound unit. 1 ml of Kenalog 40mg and 1 ml of 0.25% Marcaine was injected into the anterior aspect into the bicep tendon of the Left shoulder. The patient tolerated the injections well. I will see the patient back in 4 weeks. Images are stored and uploaded into the [employer].

## 2021-05-28 ENCOUNTER — TELEPHONE (OUTPATIENT)
Dept: FAMILY MEDICINE CLINIC | Age: 56
End: 2021-05-28

## 2021-05-28 NOTE — TELEPHONE ENCOUNTER
Message left asking the patient to call me to schedule her mammography screening with the [facility] that will be in [place] on 6/25/21 ([phone]).     Ric NEWMAN, RSantaTSanta (R) (T)  [facility], [address] [place] [address]

## 2021-06-01 ENCOUNTER — HOSPITAL ENCOUNTER (OUTPATIENT)
Age: 56
Discharge: HOME | End: 2021-06-01
Payer: COMMERCIAL

## 2021-06-01 LAB
ALBUMIN SERPL-MCNC: 4.6 G/DL (ref 3.5–5.2)
ALP BLD-CCNC: 66 U/L (ref 35–104)
ALT SERPL-CCNC: 13 U/L (ref 0–32)
ANION GAP SERPL CALCULATED.3IONS-SCNC: 10 MMOL/L (ref 7–16)
AST SERPL-CCNC: 23 U/L (ref 0–31)
BILIRUB SERPL-MCNC: 0.8 MG/DL (ref 0–1.2)
BUN BLDV-MCNC: 13 MG/DL (ref 6–20)
CALCIUM SERPL-MCNC: 9.8 MG/DL (ref 8.6–10.2)
CHLORIDE BLD-SCNC: 104 MMOL/L (ref 98–107)
CO2: 27 MMOL/L (ref 22–29)
CREAT SERPL-MCNC: 1.3 MG/DL (ref 0.5–1)
GFR AFRICAN AMERICAN: 52
GFR NON-AFRICAN AMERICAN: 43 ML/MIN/1.73
GLUCOSE BLD-MCNC: 91 MG/DL (ref 74–99)
HCT VFR BLD CALC: 45.2 % (ref 34–48)
HEMOGLOBIN: 15.3 G/DL (ref 11.5–15.5)
MCH RBC QN AUTO: 28.5 PG (ref 26–35)
MCHC RBC AUTO-ENTMCNC: 33.8 % (ref 32–34.5)
MCV RBC AUTO: 84.2 FL (ref 80–99.9)
PDW BLD-RTO: 12.4 FL (ref 11.5–15)
PHOSPHORUS: 3 MG/DL (ref 2.5–4.5)
PLATELET # BLD: 159 E9/L (ref 130–450)
PMV BLD AUTO: 11.4 FL (ref 7–12)
POTASSIUM SERPL-SCNC: 4.2 MMOL/L (ref 3.5–5)
RBC # BLD: 5.37 E12/L (ref 3.5–5.5)
SODIUM BLD-SCNC: 141 MMOL/L (ref 132–146)
TOTAL PROTEIN: 7.4 G/DL (ref 6.4–8.3)
WBC # BLD: 5.9 E9/L (ref 4.5–11.5)

## 2021-06-01 PROCEDURE — 82306 VITAMIN D 25 HYDROXY: CPT

## 2021-06-01 PROCEDURE — 84100 ASSAY OF PHOSPHORUS: CPT

## 2021-06-01 PROCEDURE — 80053 COMPREHEN METABOLIC PANEL: CPT

## 2021-06-01 PROCEDURE — 83970 ASSAY OF PARATHORMONE: CPT

## 2021-06-01 PROCEDURE — 36415 COLL VENOUS BLD VENIPUNCTURE: CPT

## 2021-06-01 PROCEDURE — 85027 COMPLETE CBC AUTOMATED: CPT

## 2021-06-02 LAB
PARATHYROID HORMONE INTACT: 54 PG/ML (ref 15–65)
VITAMIN D 25-HYDROXY: 68 NG/ML (ref 30–100)

## 2021-10-07 ENCOUNTER — OFFICE VISIT (OUTPATIENT)
Dept: ORTHOPEDIC SURGERY | Age: 56
End: 2021-10-07
Payer: COMMERCIAL

## 2021-10-07 VITALS — HEIGHT: 60 IN | BODY MASS INDEX: 30.04 KG/M2 | WEIGHT: 153 LBS | TEMPERATURE: 98 F

## 2021-10-07 DIAGNOSIS — M54.2 CERVICAL PAIN (NECK): ICD-10-CM

## 2021-10-07 DIAGNOSIS — M75.22 BICEPS TENDINITIS OF LEFT UPPER EXTREMITY: Primary | ICD-10-CM

## 2021-10-07 DIAGNOSIS — M48.02 CERVICAL SPINAL STENOSIS: ICD-10-CM

## 2021-10-07 DIAGNOSIS — S46.212A BICEPS MUSCLE STRAIN, LEFT, INITIAL ENCOUNTER: ICD-10-CM

## 2021-10-07 DIAGNOSIS — M75.42 SHOULDER IMPINGEMENT SYNDROME, LEFT: ICD-10-CM

## 2021-10-07 PROCEDURE — G8417 CALC BMI ABV UP PARAM F/U: HCPCS | Performed by: NURSE PRACTITIONER

## 2021-10-07 PROCEDURE — G8484 FLU IMMUNIZE NO ADMIN: HCPCS | Performed by: NURSE PRACTITIONER

## 2021-10-07 PROCEDURE — 4004F PT TOBACCO SCREEN RCVD TLK: CPT | Performed by: NURSE PRACTITIONER

## 2021-10-07 PROCEDURE — G8427 DOCREV CUR MEDS BY ELIG CLIN: HCPCS | Performed by: NURSE PRACTITIONER

## 2021-10-07 PROCEDURE — 3017F COLORECTAL CA SCREEN DOC REV: CPT | Performed by: NURSE PRACTITIONER

## 2021-10-07 PROCEDURE — 99214 OFFICE O/P EST MOD 30 MIN: CPT | Performed by: NURSE PRACTITIONER

## 2021-10-07 NOTE — PROGRESS NOTES
Chief Complaint   Patient presents with    Shoulder Pain     left bicep pain f/u. getting worse. onset of numbness and weakness radiating down arm         Joe Parker is a 48y.o. year old   female who is seen today  for follow up of left shoulder pain. She reports the pain has been ongoing for the past 6 months. She has been applying vickie pain cream. She is unable to take nsaids due to her kidneys. She reports she has pain shooting down her left arm with altered sensation. She received bicep tendon injection with relief for about 4-6 weeks. Chief Complaint   Patient presents with    Shoulder Pain     left bicep pain f/u. getting worse. onset of numbness and weakness radiating down arm      Past Medical History:   Diagnosis Date    Chronic back pain     Chronic kidney disease     stage 3     Past Surgical History:   Procedure Laterality Date    CARPAL TUNNEL RELEASE      two carpal tunnel surgeries    CHOLECYSTECTOMY      GALLBLADDER SURGERY      LUMBAR FUSION  2/1/2013    PLIF  L5 - S1    NOSE SURGERY      four nose surgeries    PARATHYROIDECTOMY Right 02/09/2018    ROTATOR CUFF REPAIR      SHOULDER ARTHROSCOPY Right 02/21/2020    SHOULDER ARTHROSCOPY Right 2/21/2020    RIGHT SHOULDER ARTHROSCOPY. SUBACROMIAL DECOMPRESSION, LABRAL AND BICEP  DEBRIDEMENT, ROTATOR CUFF REPAIR performed by Mik Higginbotham DO at [address]         Current Outpatient Medications:     traZODone (DESYREL) 150 MG tablet, Take one tab po nightly, Disp: 90 tablet, Rfl: 0    gabapentin (NEURONTIN) 600 MG tablet, TAKE 1 TABLET BY MOUTH 2 TIMES DAILY AS NEEDED FOR PAIN, Disp: 60 tablet, Rfl: 0  Allergies   Allergen Reactions    Penicillin G Hives and Nausea And Vomiting     Social History     Socioeconomic History    Marital status:       Spouse name: Not on file    Number of children: Not on file    Years of education: Not on file    Highest education level: Not on file Occupational History    Not on file   Tobacco Use    Smoking status: Current Every Day Smoker     Packs/day: 0.50     Years: 10.00     Pack years: 5.00     Types: Cigarettes    Smokeless tobacco: Never Used    Tobacco comment: quit smoking  11/2012   Vaping Use    Vaping Use: Never used   Substance and Sexual Activity    Alcohol use: No    Drug use: No    Sexual activity: Not on file   Other Topics Concern    Not on file   Social History Narrative    Not on file     Social Determinants of Health     Financial Resource Strain:     Difficulty of Paying Living Expenses:    Food Insecurity:     Worried About Running Out of Food in the Last Year:     [address] in the Last Year:    Transportation Needs:     Lack of Transportation (Medical):  Lack of Transportation (Non-Medical):    Physical Activity:     Days of Exercise per Week:     Minutes of Exercise per Session:    Stress:     Feeling of Stress :    Social Connections:     Frequency of Communication with Friends and Family:     Frequency of Social Gatherings with Friends and Family:     Attends Confucianism Services:     Active Member of Clubs or Organizations:     Attends Club or Organization Meetings:     Marital Status:    Intimate Partner Violence:     Fear of Current or Ex-Partner:     Emotionally Abused:     Physically Abused:     Sexually Abused:      Family History   Problem Relation Age of Onset    Heart Disease Mother     Arthritis Mother     Diabetes Mother     High Blood Pressure Mother     Kidney Disease Mother     Heart Disease Father     High Blood Pressure Father     Arthritis Father     Stroke Father     Diabetes Sister     Arthritis Maternal Uncle     Diabetes Maternal Uncle     Kidney Disease Maternal Uncle     Arthritis Maternal Grandmother        REVIEW OF SYSTEMS:     General/Constitution:  (-)weight loss, (-)fever, (-)chills, (-)weakness.    Skin: (-) rash,(-) psoriasis,(-) eczema, (-)skin cancer. Musculoskeletal: (-) fractures,  (-) dislocations,(-) collagen vascular disease, (-) fibromyalgia, (-) multiple sclerosis, (-) muscular dystrophy, (-) RSD,(-) joint pain (-)swelling, (-) joint pain,swelling. Neurologic: (-) epilepsy, (-)seizures,(-) brain tumor,(-) TIA, (-)stroke, (-)headaches, (-)Parkinson disease,(-) memory loss, (-) LOC. Cardiovascular: (-) Chest pain, (-) swelling in legs/feet, (-) SOB, (-) cramping in legs/feet with walking. Respiratory: (-) SOB, (-) Coughing, (-) night sweats. GI: (-) nausea, (-) vomiting, (-) diarrhea, (-) blood in stool, (-) gastric ulcer. Psychiatric: (-) Depression, (-) Anxiety, (-) bipolar disease, (-) Alzheimer's Disease  Allergic/Immunologic: (-) allergies latex, (-) allergies metal, (-) skin sensitivity. Hematlogic: (-) anemia, (-) blood transfusion, (-) DVT/PE, (-) Clotting disorders      Subjective:    Constitution:  Temp 98 °F (36.7 °C)   Ht 5' (1.524 m)   Wt 153 lb (69.4 kg)   LMP 04/15/2015   BMI 29.88 kg/m²     Psycihatric:  The patient is alert and oriented x 3, appears to be stated age and in no distress. Respiratory:  Respiratory effort is not labored. Patient is not gasping. Palpation of the chest reveals no tactile fremitus. Skin:  Upon inspection: the skin appears warm, dry and intact. There is not a previous scar over the affected area. There is not any cellulitis, lymphedema or cutaneous lesions noted in the lower extremities. Upon palpation there is no induration noted. Neurologic:  Motor exam of the upper extremities show: The reflexes in biceps/triceps/brachioradialis are equal and symmetric. Sensory exam C5-T1 are normal bilaterally. .        Cardiovascular: The vascular exam is normal and is well perfused to distal extremities. There are 2+ radial pulses bilaterally, and motor and sensation is intact to median, ulnar, and radial, musclocutaneus, and axillary nerve distribution and grossly symmetric bilaterally. There is cap refill noted less than two seconds in all digits. There is not edema of the bilateral upper extremities. There is not varicosities noted in the distal extremities. Lymph:  Upon palpation,  there is no lymphadenopathy noted in bilateral upper extremities. Musculoskeletal:  Gait: normal; examination of the nails and digits reveal no cyanosis or clubbing. Cervical Exam:  On physical exam, Ashly Wheat is well-developed, well-nourished, oriented to person, place and time. her gait is normal.  On evaluation of hercervical spine, She has limited range of motion of the cervical spine with pain. There is cervical tenderness to palpation. Shoulder Exam:  On evaluation of her bilaterally upper extremities, her left shoulder has no deformity. There is tenderness upon palpation of the bicep tendon and anterior shoulder. There is not evidence of scapular dyskinesis. There is not muscle atrophy in shoulder girdle. The range of motion for the Right Shoulder is 150/50/t8 and for the Left shoulder is 140/45/t10. Right shoulder Motor strength is 5/5 in the supraspinatus, 5/5 internal rotation and 5/5 in external rotation, and Left shoulder motor strength 5/5 in supraspinatus, 5/5 in internal rotation, 5/5 in external rotation. Right shoulder:  negative Impingement , negative Whitley ,negative  Speeds,negative  Apprehension ,negative Dean Load Shift, negative Binta manuver, negative Cross arm test.     Left shoulder:  negative Impingement , negative Whitley ,positive Speeds,negative  Apprehension ,negative Dean Load Shift, positiveSperling manuver, negative Cross arm test.     XRAY:  n/a    MRI:  n/a    Radiographic findings reviewed with patient    Impression:   Encounter Diagnoses   Name Primary?  Biceps tendinitis of left upper extremity Yes    Biceps muscle strain, left, initial encounter        Plan: Natural history and expected course discussed.  Questions answered. Educational material distributed. Reduction in offending activity. Gentle ROM exercises  RICE therapy. I had a lengthy discussion with the patient regarding their diagnosis. I explained treatment options including surgical vs non surgical treatment. I reviewed in detail the risks and benefits and outlined the procedure in detail with expected outcomes and possible complications. I also discussed non surgical treatment such as injections (CSI), physical therapy, topical creams and NSAID's. They have elected for conservative management at this time.    Continue vickie pain cream  MRI left shoulder  Refer to dr ashley for cervical stenosis

## 2021-10-13 ENCOUNTER — OFFICE VISIT (OUTPATIENT)
Dept: PAIN MANAGEMENT | Age: 56
End: 2021-10-13
Payer: COMMERCIAL

## 2021-10-13 VITALS
WEIGHT: 153 LBS | HEIGHT: 60 IN | DIASTOLIC BLOOD PRESSURE: 82 MMHG | BODY MASS INDEX: 30.04 KG/M2 | TEMPERATURE: 97.3 F | RESPIRATION RATE: 16 BRPM | OXYGEN SATURATION: 98 % | HEART RATE: 75 BPM | SYSTOLIC BLOOD PRESSURE: 124 MMHG

## 2021-10-13 DIAGNOSIS — M50.30 DDD (DEGENERATIVE DISC DISEASE), CERVICAL: Primary | ICD-10-CM

## 2021-10-13 DIAGNOSIS — M25.512 CHRONIC LEFT SHOULDER PAIN: ICD-10-CM

## 2021-10-13 DIAGNOSIS — M54.12 CERVICAL RADICULOPATHY: ICD-10-CM

## 2021-10-13 DIAGNOSIS — M47.812 CERVICAL SPONDYLOSIS: ICD-10-CM

## 2021-10-13 DIAGNOSIS — M96.1 POSTLAMINECTOMY SYNDROME, LUMBAR: ICD-10-CM

## 2021-10-13 DIAGNOSIS — F17.200 SMOKING: ICD-10-CM

## 2021-10-13 DIAGNOSIS — M53.3 SACROILIAC DYSFUNCTION: ICD-10-CM

## 2021-10-13 DIAGNOSIS — M51.36 DDD (DEGENERATIVE DISC DISEASE), LUMBAR: ICD-10-CM

## 2021-10-13 DIAGNOSIS — G89.29 CHRONIC LEFT SHOULDER PAIN: ICD-10-CM

## 2021-10-13 PROCEDURE — G8417 CALC BMI ABV UP PARAM F/U: HCPCS | Performed by: ANESTHESIOLOGY

## 2021-10-13 PROCEDURE — G8427 DOCREV CUR MEDS BY ELIG CLIN: HCPCS | Performed by: ANESTHESIOLOGY

## 2021-10-13 PROCEDURE — 3017F COLORECTAL CA SCREEN DOC REV: CPT | Performed by: ANESTHESIOLOGY

## 2021-10-13 PROCEDURE — 99204 OFFICE O/P NEW MOD 45 MIN: CPT | Performed by: ANESTHESIOLOGY

## 2021-10-13 PROCEDURE — G8484 FLU IMMUNIZE NO ADMIN: HCPCS | Performed by: ANESTHESIOLOGY

## 2021-10-13 PROCEDURE — 4004F PT TOBACCO SCREEN RCVD TLK: CPT | Performed by: ANESTHESIOLOGY

## 2021-10-13 PROCEDURE — 99205 OFFICE O/P NEW HI 60 MIN: CPT | Performed by: ANESTHESIOLOGY

## 2021-10-13 RX ORDER — METHYLPREDNISOLONE 4 MG/1
TABLET ORAL
Qty: 1 KIT | Refills: 0 | Status: SHIPPED | OUTPATIENT
Start: 2021-10-13 | End: 2021-10-19

## 2021-10-13 RX ORDER — IBUPROFEN 200 MG
200 TABLET ORAL EVERY 6 HOURS PRN
COMMUNITY
End: 2022-03-30 | Stop reason: ALTCHOICE

## 2021-10-13 RX ORDER — COVID-19 ANTIGEN TEST
KIT MISCELLANEOUS
COMMUNITY
End: 2022-03-30

## 2021-10-13 NOTE — PROGRESS NOTES
[address]        [address] [facility], [address] [facility]      [phone]                  Consult Note      Patient:  Reanna Solitario,  1965    Date of Service:  10/13/21     Requesting Physician:  SHARATH Kunz*    Reason for Consult:      Patient presents with complaints of neck pain, low back pain and left shoulder pain    HISTORY OF PRESENT ILLNESS:      Ms. Reanna Solitario is a 48 y.o. female [facility] of  chronic neck pain and left UE pain. Has been followed by ortho for left shoulder pain. S/P Left biceps tendon injection at shoulder which had helped for few weeks. She is supposed to get MRI of left shoulder  And follow up with ortho. Was referred to us due to complains of neck pain intermittently and left UE pain and tingling / numbness and weakness. Pain is constant and is described as aching and throbbing. Patient does not have bladder or bowel dysfunction. Alleviating factors include: rest.  Aggravating factors include: movement, lifting. Pain causes functional limitations/ limits Adl's : Yes    Nursing notes and details of the pain history reviewed. Please see intake notes for details. Chronic low back pain for years- prior lumbar surgery in  by Dr. Saba Shelley. Continues to have low back and LE pain. Has been treated at the pain center and [facility], [facility]. in [facility]. At some point she was on chronic pain medications. She did not like being on opioids and she discontinued going there. NOTE:  H/o CRI- does not want NSAID's BUN- 13, creatinine-1.3 on 2021.     Previous treatments:   Physical Therapy : yes, continues HEP    Medications: - NSAID's : H/o CRI- avoid NSAID's            - Membrane stabilizers : yes -            - Opioids : yes, in the past             - Adjuvants or Others : yes,    Surgeries: yes, L5-S1 PLIF in     She has not been on anticoagulation medications     She has not been on herbal supplements. She is not diabetic. H/O Smoking: yes  H/O alcohol abuse : no  H/O Illicit drug use : denies    Employment: stay at home    Imaging:     MRI of left shoulder: pending    CT lumbar spine: 3/2014:     Findings- Postsurgical changes are noted. Status post laminectomy at   L4-L5 and L5-S1. Status post fusion of L5 and S1.       At the level of L5-S1, there is disc bulging, no focal disc herniation,   and no evidence for recurrent disc herniation. Significant arthritic   changes in facet joint.        At the level of L4-L5, there is disc bulging, no focal disc herniation   noted. Arthritic changes are noted in the facet joint. No significant   central canal stenosis is noted. No evidence for recurrent disc   herniation by this study. There is no abscess or hematoma. Surgical   bed.        At the level of L3-L4, there is no focal disc herniation or central   canal stenosis noted.       Other disc spaces demonstrate spondylotic changes to a lesser. Paraspinal soft tissues are grossly unremarkable. There is no fracture   seen in the sacrum. SI joints are symmetric. Stone seen in the right   kidney. There is a stone seen in the left kidney. No hydronephrosis.       Impression-    1. Postsurgical changes. Status post laminectomy at L4-L5 and L5-S1. There is fusion of L5-S1.   2. Arthritic changes in the facet joint. 3. No abscess or hematoma in the surgical bed.        Past Medical History:   Diagnosis Date    Chronic back pain     Chronic kidney disease     stage 3       Past Surgical History:   Procedure Laterality Date    CARPAL TUNNEL RELEASE      two carpal tunnel surgeries    CHOLECYSTECTOMY      GALLBLADDER SURGERY      LUMBAR FUSION  2/1/2013    PLIF  L5 - S1    NOSE SURGERY      four nose surgeries    PARATHYROIDECTOMY Right 02/09/2018    ROTATOR CUFF REPAIR      SHOULDER ARTHROSCOPY Right 02/21/2020    SHOULDER ARTHROSCOPY Right 2/21/2020    RIGHT SHOULDER ARTHROSCOPY. SUBACROMIAL DECOMPRESSION, LABRAL AND BICEP  DEBRIDEMENT, ROTATOR CUFF REPAIR performed by Jeremy Ariza DO at [address]         Prior to Admission medications    Medication Sig Start Date End Date Taking? Authorizing Provider   Naproxen Sodium (ALEVE) 220 MG CAPS Take by mouth 1 tablet PRN   Yes Historical Provider, MD   ibuprofen (ADVIL;MOTRIN) 200 MG tablet Take 200 mg by mouth every 6 hours as needed for Pain PRN   Yes Historical Provider, MD   traZODone (DESYREL) 150 MG tablet Take one tab po nightly 7/23/21  Yes Ginger SADIE Patino-TR   gabapentin (NEURONTIN) 600 MG tablet TAKE 1 TABLET BY MOUTH 2 TIMES DAILY AS NEEDED FOR PAIN 7/23/21 1/26/22 Yes Ginger Cordia PA-C       Allergies   Allergen Reactions    Penicillin G Hives and Nausea And Vomiting       Social History     Socioeconomic History    Marital status:      Spouse name: Not on file    Number of children: Not on file    Years of education: Not on file    Highest education level: Not on file   Occupational History    Not on file   Tobacco Use    Smoking status: Current Every Day Smoker     Packs/day: 0.50     Years: 10.00     Pack years: 5.00     Types: Cigarettes    Smokeless tobacco: Never Used    Tobacco comment: quit smoking  11/2012   Vaping Use    Vaping Use: Never used   Substance and Sexual Activity    Alcohol use: No    Drug use: No    Sexual activity: Not on file   Other Topics Concern    Not on file   Social History Narrative    Not on file     Social Determinants of Health     Financial Resource Strain:     Difficulty of Paying Living Expenses:    Food Insecurity:     Worried About Running Out of Food in the Last Year:     [address] in the Last Year:    Transportation Needs:     Lack of Transportation (Medical):      Lack of Transportation (Non-Medical):    Physical Activity:     Days of Exercise per Week:     Minutes of Exercise per Session:    Stress:     Feeling of Stress :    Social Connections:     Frequency of Communication with Friends and Family:     Frequency of Social Gatherings with Friends and Family:     Attends Hinduism Services:     Active Member of Clubs or Organizations:     Attends Club or Organization Meetings:     Marital Status:    Intimate Partner Violence:     Fear of Current or Ex-Partner:     Emotionally Abused:     Physically Abused:     Sexually Abused:        Family History   Problem Relation Age of Onset    Heart Disease Mother     Arthritis Mother     Diabetes Mother     High Blood Pressure Mother     Kidney Disease Mother     Heart Disease Father     High Blood Pressure Father     Arthritis Father     Stroke Father     Diabetes Sister     Arthritis Maternal Uncle     Diabetes Maternal Uncle     Kidney Disease Maternal Uncle     Arthritis Maternal Grandmother        REVIEW OF SYSTEMS:     Patient specifically denies fever/chills, chest pain, shortness of breath, new bowel or bladder complaints. All other review of systems was negative. Review of Systems - documented reviewed. PHYSICAL EXAMINATION:      /82   Pulse 75   Temp 97.3 °F (36.3 °C) (Infrared)   Resp 16   Ht 5' (1.524 m)   Wt 153 lb (69.4 kg)   LMP 04/15/2015   SpO2 98%   BMI 29.88 kg/m²     General:      General appearance:  Pleasant and well-hydrated, in no distress and A & O x 3  Build:Overweight  Function: Rises from seated position easily and Moves about room without difficulty    HEENT:    Head:normocephalic, atraumatic    Lungs:    Breathing:normal breathing pattern     CVS:     RRR    Abdomen:    Shape:non-distended and normal    Cervical spine:    Inspection:normal  Palpation:tenderness paravertebral muscles, tenderness trapezium, left, right and positive.   Range of motion:some reduction for extension and rotation  Spurling's: negative bilaterally    Thoracic spine:     Spine inspection:normal   Palpation:No tenderness over the midline and paraspinal area, bilaterally  Range of motion:normal in flexion, extension rotation bilateral and is not painful. Lumbar spine:    Spine inspection: Scar from the prior surgery- healed well  Palpation: Tenderness paravertebral muscles Yes bilaterally  Range of motion: Decreased,  Sacroiliac joint tenderness Yes bilaterally  SLR : negative bilaterally    Musculoskeletal:    Trigger points no    Extremities:    Tremors:None bilaterally upper and lower  Edema:none x all 4 extremities    Left shoulder:  ROM reduced- for abduction, tenderness over he anterior shoulder +    Neurological:    Sensory: Normal to light touch     Motor:   Right  5/5              Left  5/5               Right Bicep 5/5           Left Bicep 5/5              Right Triceps 5/5       Left Triceps 5/5          Right Deltoid 5/5     Left Deltoid 5/5                  Right Quadriceps 5/5          Left Quadriceps 5/5           Right Gastrocnemius 5/5    Left Gastrocnemius 5/5  Right Ant Tibialis 5/5  Left Ant Tibialis 5/5    Reflexes:    B/l equal - diminished. Gait:normal Yes    Dermatology:    Skin:no rashes or lesions noted    Assessment/Plan:     Diagnosis Orders   1. DDD (degenerative disc disease), cervical     2. Cervical spondylosis     3. Cervical radiculopathy     4. Chronic left shoulder pain     5. Postlaminectomy syndrome, lumbar     6. DDD (degenerative disc disease), lumbar     7. Sacroiliac dysfunction     8. Smoking         48 y.o. female with H/o neck pain and left UE pain. Neck pain: and left UE pain: features of cervical DDD/ radicular symptoms. Will get  Xray C spine    Physical therapy    TENS unit-trial.  If helps recommend long-term use    Medrol dose pack. Use instructions reviewed    If continued neck/ UE pain, will do MRI of C spine and spine interventions. Left shoulder pain: and reduced ROM.  Followed by ortho- MRI of Lt shoulder pending. Recommend to continue follow-up with Ortho regarding left shoulder pain. Chronic low back pain: Prior H/o L5-S1 PLIF in 2013 by Dr Angela Brownlee. Has low back / LE pain. Prior interventions and pain meds at different pain center. Does not like pain meds and has discontinued. On Gabapentin 600 mg daily. Will get x- ray LS spine. Consider MRI/CT LS spine in future. May be a candidate for SCS trial for low back pain in future. Informational video given. Can increase gabapentin. H/o Smoking +    Counseling : Patient encouraged to stay active, watch weight and to continue Regular home exercise program as tolerated - stretching / strengthening. Smoking cessation counseling : yes -stressed importance of smoking cessation on spine health and general health. Treatment plan discussed with the patient including medication and procedure side effects. Controlled Substances Monitoring:   [place]ARRS reviewed    Sandra Whitaker MD    Dear Dr. Baldo Sylvester,   Thank you for referring Ms. Edith Cohn and allowing us to participate in her care. Please do not hesitate to contact me if you have any questions regarding her care.     Santhosh Mon MD    CC:    Anthony Buckner, APRN - CNP  [address] [place] [address] Drive [address],  [address] [place] [address]  [address], DO  [facility] [address]

## 2021-10-20 ENCOUNTER — HOSPITAL ENCOUNTER (OUTPATIENT)
Dept: GENERAL RADIOLOGY | Age: 56
Discharge: HOME | End: 2021-10-22
Payer: COMMERCIAL

## 2021-10-20 ENCOUNTER — HOSPITAL ENCOUNTER (OUTPATIENT)
Age: 56
Discharge: HOME | End: 2021-10-22
Payer: COMMERCIAL

## 2021-10-20 DIAGNOSIS — M96.1 POSTLAMINECTOMY SYNDROME, LUMBAR: ICD-10-CM

## 2021-10-20 DIAGNOSIS — M47.812 CERVICAL SPONDYLOSIS: ICD-10-CM

## 2021-10-20 DIAGNOSIS — M50.30 DDD (DEGENERATIVE DISC DISEASE), CERVICAL: ICD-10-CM

## 2021-10-20 DIAGNOSIS — M51.36 DDD (DEGENERATIVE DISC DISEASE), LUMBAR: ICD-10-CM

## 2021-10-20 PROCEDURE — 72110 X-RAY EXAM L-2 SPINE 4/>VWS: CPT

## 2021-10-20 PROCEDURE — 72050 X-RAY EXAM NECK SPINE 4/5VWS: CPT

## 2021-11-01 ENCOUNTER — OFFICE VISIT (OUTPATIENT)
Dept: ORTHOPEDIC SURGERY | Age: 56
End: 2021-11-01
Payer: COMMERCIAL

## 2021-11-01 VITALS — WEIGHT: 153 LBS | TEMPERATURE: 98 F | HEIGHT: 60 IN | BODY MASS INDEX: 30.04 KG/M2

## 2021-11-01 DIAGNOSIS — S46.012A TRAUMATIC COMPLETE TEAR OF LEFT ROTATOR CUFF, INITIAL ENCOUNTER: ICD-10-CM

## 2021-11-01 DIAGNOSIS — M75.42 SHOULDER IMPINGEMENT SYNDROME, LEFT: Primary | ICD-10-CM

## 2021-11-01 PROCEDURE — 4004F PT TOBACCO SCREEN RCVD TLK: CPT | Performed by: ORTHOPAEDIC SURGERY

## 2021-11-01 PROCEDURE — G8428 CUR MEDS NOT DOCUMENT: HCPCS | Performed by: ORTHOPAEDIC SURGERY

## 2021-11-01 PROCEDURE — 99214 OFFICE O/P EST MOD 30 MIN: CPT | Performed by: ORTHOPAEDIC SURGERY

## 2021-11-01 PROCEDURE — G8417 CALC BMI ABV UP PARAM F/U: HCPCS | Performed by: ORTHOPAEDIC SURGERY

## 2021-11-01 PROCEDURE — G8484 FLU IMMUNIZE NO ADMIN: HCPCS | Performed by: ORTHOPAEDIC SURGERY

## 2021-11-01 PROCEDURE — 3017F COLORECTAL CA SCREEN DOC REV: CPT | Performed by: ORTHOPAEDIC SURGERY

## 2021-11-01 NOTE — PROGRESS NOTES
Chief Complaint   Patient presents with    Shoulder Pain     Left shoulder F/U MRI results. Phoebe Lacey is here today for her MRI f/u. She continues to have left shoulder pain that is effecting her ADL's and the ability to perform any work. Chief Complaint   Patient presents with    Shoulder Pain     Left shoulder F/U MRI results. Past Medical History:   Diagnosis Date    Chronic back pain     Chronic kidney disease     stage 3     Past Surgical History:   Procedure Laterality Date    CARPAL TUNNEL RELEASE      two carpal tunnel surgeries    CHOLECYSTECTOMY      GALLBLADDER SURGERY      LUMBAR FUSION  2/1/2013    PLIF  L5 - S1    NOSE SURGERY      four nose surgeries    PARATHYROIDECTOMY Right 02/09/2018    ROTATOR CUFF REPAIR      SHOULDER ARTHROSCOPY Right 02/21/2020    SHOULDER ARTHROSCOPY Right 2/21/2020    RIGHT SHOULDER ARTHROSCOPY. SUBACROMIAL DECOMPRESSION, LABRAL AND BICEP  DEBRIDEMENT, ROTATOR CUFF REPAIR performed by Alex Mcginnis DO at [address]         Current Outpatient Medications:     Naproxen Sodium (ALEVE) 220 MG CAPS, Take by mouth 1 tablet PRN, Disp: , Rfl:     ibuprofen (ADVIL;MOTRIN) 200 MG tablet, Take 200 mg by mouth every 6 hours as needed for Pain PRN, Disp: , Rfl:     traZODone (DESYREL) 150 MG tablet, Take one tab po nightly, Disp: 90 tablet, Rfl: 0    gabapentin (NEURONTIN) 600 MG tablet, TAKE 1 TABLET BY MOUTH 2 TIMES DAILY AS NEEDED FOR PAIN, Disp: 60 tablet, Rfl: 0  Allergies   Allergen Reactions    Penicillin G Hives and Nausea And Vomiting     Social History     Socioeconomic History    Marital status:       Spouse name: Not on file    Number of children: Not on file    Years of education: Not on file    Highest education level: Not on file   Occupational History    Not on file   Tobacco Use    Smoking status: Current Every Day Smoker     Packs/day: 0.50     Years: 10.00     Pack years: 5.00     Types: Cigarettes    Smokeless tobacco: Never Used    Tobacco comment: quit smoking  11/2012   Vaping Use    Vaping Use: Never used   Substance and Sexual Activity    Alcohol use: No    Drug use: No    Sexual activity: Not on file   Other Topics Concern    Not on file   Social History Narrative    Not on file     Social Determinants of Health     Financial Resource Strain:     Difficulty of Paying Living Expenses:    Food Insecurity:     Worried About Running Out of Food in the Last Year:     [address] in the Last Year:    Transportation Needs:     Lack of Transportation (Medical):  Lack of Transportation (Non-Medical):    Physical Activity:     Days of Exercise per Week:     Minutes of Exercise per Session:    Stress:     Feeling of Stress :    Social Connections:     Frequency of Communication with Friends and Family:     Frequency of Social Gatherings with Friends and Family:     Attends Mormonism Services:     Active Member of Clubs or Organizations:     Attends Club or Organization Meetings:     Marital Status:    Intimate Partner Violence:     Fear of Current or Ex-Partner:     Emotionally Abused:     Physically Abused:     Sexually Abused:      Family History   Problem Relation Age of Onset    Heart Disease Mother     Arthritis Mother     Diabetes Mother     High Blood Pressure Mother     Kidney Disease Mother     Heart Disease Father     High Blood Pressure Father     Arthritis Father     Stroke Father     Diabetes Sister     Arthritis Maternal Uncle     Diabetes Maternal Uncle     Kidney Disease Maternal Uncle     Arthritis Maternal Grandmother        REVIEW OF SYSTEMS:     General/Constitution:  (-)weight loss, (-)fever, (-)chills, (-)weakness. Skin: (-) rash,(-) psoriasis,(-) eczema, (-)skin cancer.    Musculoskeletal: (-) fractures,  (-) dislocations,(-) collagen vascular disease, (-) fibromyalgia, (-) multiple sclerosis, (-) muscular dystrophy, (-) RSD,(-) joint pain (-)swelling, (-) joint pain,swelling. Neurologic: (-) epilepsy, (-)seizures,(-) brain tumor,(-) TIA, (-)stroke, (-)headaches, (-)Parkinson disease,(-) memory loss, (-) LOC. Cardiovascular: (-) Chest pain, (-) swelling in legs/feet, (-) SOB, (-) cramping in legs/feet with walking. Respiratory: (-) SOB, (-) Coughing, (-) night sweats. GI: (-) nausea, (-) vomiting, (-) diarrhea, (-) blood in stool, (-) gastric ulcer. Psychiatric: (-) Depression, (-) Anxiety, (-) bipolar disease, (-) Alzheimer's Disease  Allergic/Immunologic: (-) allergies latex, (-) allergies metal, (-) skin sensitivity. Hematlogic: (-) anemia, (-) blood transfusion, (-) DVT/PE, (-) Clotting disorders      Subjective:    Constitution:  Temp 98 °F (36.7 °C)   Ht 5' (1.524 m)   Wt 153 lb (69.4 kg)   LMP 04/15/2015   BMI 29.88 kg/m²      In general, patient is awake, alert and oriented X3, in no apparent distress. Examination of HENT reveals normocephalic, atraumatic. PERRLA/EOMI sclera are white. Conjunctivae are clear. TM's are intact. Pharynx is pink and moist.  Uvula and tongue are midline. Heart: Positive S1 and positive S2 with regular rate and rhythm. Lungs: Clear to auscultation bilaterally without rales, rhonchi or wheezes. Abdomen: soft, nontender. Positive bowel sounds. No organomegaly. No guarding or rigidity. Psycihatric:  The patient is alert and oriented x 3, appears to be stated age and in no distress. Respiratory:  Respiratory effort is not labored. Patient is not gasping. Palpation of the chest reveals no tactile fremitus. Skin:  Upon inspection: the skin appears warm, dry and intact. There is not a previous scar over the affected area. There is not any cellulitis, lymphedema or cutaneous lesions noted in the lower extremities. Upon palpation there is no induration noted. Neurologic:  Motor exam of the upper extremities show:  The reflexes in biceps/triceps/brachioradialis are equal and symmetric. Sensory exam C5-T1 are normal bilaterally. .        Cardiovascular: The vascular exam is normal and is well perfused to distal extremities. There are 2+ radial pulses bilaterally, and motor and sensation is intact to median, ulnar, and radial, musclocutaneus, and axillary nerve distribution and grossly symmetric bilaterally. There is cap refill noted less than two seconds in all digits. There is not edema of the bilateral upper extremities. There is not varicosities noted in the distal extremities. Lymph:  Upon palpation,  there is no lymphadenopathy noted in bilateral upper extremities. Musculoskeletal:  Gait: normal; examination of the nails and digits reveal no cyanosis or clubbing. Cervical Exam:  On physical exam, Javad Lerma is well-developed, well-nourished, oriented to person, place and time. her gait is normal.  On evaluation of hercervical spine, She has limited range of motion of the cervical spine with pain. There is cervical tenderness to palpation. Shoulder Exam:  On evaluation of her bilaterally upper extremities, her left shoulder has no deformity. There is tenderness upon palpation of the bicep tendon and anterior shoulder. There is not evidence of scapular dyskinesis. There is not muscle atrophy in shoulder girdle. The range of motion for the Right Shoulder is 150/50/t8 and for the Left shoulder is 140/45/t10. Right shoulder Motor strength is 5/5 in the supraspinatus, 5/5 internal rotation and 5/5 in external rotation, and Left shoulder motor strength 5/5 in supraspinatus, 5/5 in internal rotation, 5/5 in external rotation.         Right shoulder:  negative Impingement , negative Whitley ,negative  Speeds,negative  Apprehension ,negative Dean Load Shift, negative Binta manuver, negative Cross arm test.     Left shoulder:  negative Impingement , negative Whitley ,positive Speeds,negative  Apprehension ,negative Dean Load Shift, positiveSperling manuver, negative Cross arm test.     XRAY:  n/a    MRI:    1. Retracted full-thickness tearing of mid and posterior supraspinatus with   retraction of the torn fibers from the footplate measuring up to 1.9 cm.   2. Less than 50% multifocal partial-thickness articular-surface and   interstitial tearing of infraspinatus between musculotendinous junction and   footplate.  Moderate underlying infraspinatus tendinopathy. 3. Mild tendinosis of the intra-articular long head of the biceps tendon. 4. Mild diffuse labral degeneration.  Mild glenohumeral chondromalacia. Moderate glenohumeral joint effusion. 5. Mild degenerative change of the left AC joint. Radiographic findings reviewed with patient    Impression:   Encounter Diagnoses   Name Primary?  Shoulder impingement syndrome, left Yes    Traumatic complete tear of left rotator cuff, initial encounter        Plan: Natural history and expected course discussed. Questions answered. Educational material distributed. Reduction in offending activity. Gentle ROM exercises  RICE therapy. I had a lengthy discussion with the patient regarding their diagnosis. I explained treatment options including surgical vs non surgical treatment. I reviewed in detail the risks and benefits and outlined the procedure in detail with expected outcomes and possible complications. I also discussed non surgical treatment such as injections (CSI), physical therapy, topical creams and NSAID's. They have elected for surgical management at this time. I discussed the risks and benefits of the shoulder arthroscopy with the patient. The risks include but are not limited to: infection, injuries to blood vessels and nerves, non relief of symptoms, intraoperative fracture, need for further operative intervention, blood loss, arthrofibrosis of shoulder, DVT/PE, MI and death. The patient understands these risks and wishes to proceed with surgery.  I will perform a Left shoulder arthroscopy,RTC, possible bicep tenodesis, SAD and debridement 11/12/2021. The patient was counseled at length about the risks of shruti Covid-19 during their perioperative period and any recovery window from their procedure. The patient was made aware that shruti Covid-19  may worsen their prognosis for recovering from their procedure  and lend to a higher morbidity and/or mortality risk. All material risks, benefits, and reasonable alternatives including postponing the procedure were discussed. The patient does wish to proceed with the procedure at this time. At least 30 minutes was spent discussing the diagnosis and treatment options with the patient with at least 50% of the time was spent with decision making and counseling the patient.

## 2021-11-03 ENCOUNTER — OFFICE VISIT (OUTPATIENT)
Dept: FAMILY MEDICINE CLINIC | Age: 56
End: 2021-11-03
Payer: COMMERCIAL

## 2021-11-03 VITALS
TEMPERATURE: 97.8 F | HEIGHT: 60 IN | RESPIRATION RATE: 18 BRPM | WEIGHT: 153.8 LBS | SYSTOLIC BLOOD PRESSURE: 113 MMHG | DIASTOLIC BLOOD PRESSURE: 76 MMHG | OXYGEN SATURATION: 99 % | HEART RATE: 69 BPM | BODY MASS INDEX: 30.19 KG/M2

## 2021-11-03 DIAGNOSIS — Z01.818 PRE-OP EXAMINATION: Primary | ICD-10-CM

## 2021-11-03 PROCEDURE — G8427 DOCREV CUR MEDS BY ELIG CLIN: HCPCS | Performed by: FAMILY MEDICINE

## 2021-11-03 PROCEDURE — G8417 CALC BMI ABV UP PARAM F/U: HCPCS | Performed by: FAMILY MEDICINE

## 2021-11-03 PROCEDURE — 4004F PT TOBACCO SCREEN RCVD TLK: CPT | Performed by: FAMILY MEDICINE

## 2021-11-03 PROCEDURE — 3017F COLORECTAL CA SCREEN DOC REV: CPT | Performed by: FAMILY MEDICINE

## 2021-11-03 PROCEDURE — G8484 FLU IMMUNIZE NO ADMIN: HCPCS | Performed by: FAMILY MEDICINE

## 2021-11-03 PROCEDURE — 99214 OFFICE O/P EST MOD 30 MIN: CPT | Performed by: FAMILY MEDICINE

## 2021-11-03 RX ORDER — TRAZODONE HYDROCHLORIDE 150 MG/1
TABLET ORAL
Qty: 90 TABLET | Refills: 1 | Status: SHIPPED
Start: 2021-11-03 | End: 2022-05-01 | Stop reason: SDUPTHER

## 2021-11-03 RX ORDER — GABAPENTIN 600 MG/1
TABLET ORAL
Qty: 60 TABLET | Refills: 5 | Status: SHIPPED | OUTPATIENT
Start: 2021-11-03 | End: 2022-07-07 | Stop reason: SDUPTHER

## 2021-11-03 SDOH — ECONOMIC STABILITY: FOOD INSECURITY: WITHIN THE PAST 12 MONTHS, THE FOOD YOU BOUGHT JUST DIDN'T LAST AND YOU DIDN'T HAVE MONEY TO GET MORE.: NEVER TRUE

## 2021-11-03 SDOH — ECONOMIC STABILITY: FOOD INSECURITY: WITHIN THE PAST 12 MONTHS, YOU WORRIED THAT YOUR FOOD WOULD RUN OUT BEFORE YOU GOT MONEY TO BUY MORE.: NEVER TRUE

## 2021-11-03 ASSESSMENT — ENCOUNTER SYMPTOMS
CONSTIPATION: 0
EYE PAIN: 0
SINUS PRESSURE: 0
SHORTNESS OF BREATH: 0
COUGH: 0
DIARRHEA: 0
SORE THROAT: 0
ABDOMINAL PAIN: 0
BACK PAIN: 0

## 2021-11-03 ASSESSMENT — SOCIAL DETERMINANTS OF HEALTH (SDOH): HOW HARD IS IT FOR YOU TO PAY FOR THE VERY BASICS LIKE FOOD, HOUSING, MEDICAL CARE, AND HEATING?: NOT HARD AT ALL

## 2021-11-03 NOTE — PROGRESS NOTES
SUBJECTIVE:  Rani Harden is a 48 y.o. female who presents for a PRE-OPERATIVE PHYSICAL at the request of Dr. Agustín Luevano in anticipation of left shoulder RTC repair which is scheduled on 12/2/21. We discussed the following issues:     1) Denies problems with anesthesia or bleeding. I have personally reviewed the nursing note and triage note for this patient. I also reviewed the visit navigator for clinical datarelevant to this visit. Past medical history, social and surgical history, family history, medications and allergies all reviewed and updated in the chart today. Race and ethnicity documented in EHR verified with patient,see demographics for further details.     FUNCTIONAL CAPACITY (Bolded line indicates patient's functional capacity level):  1-3        Watching television  Eating, dressing,cooking, using the toilet  Walking one or two blocks on level ground at 2-3 miles per hour  Doing light housework (ex dusting)    4-10        Climbing a flight of stairs     Walking on level ground at 4miles per hour  Running a short distance  Doing heavy chores around the house (ex scrubbing floors, lifting furniture)  Playing moderately strenuous sports (ex golf, dance, bowling)    >10    Playingstrenuous sports (ex tennis, basketball)    taken from [facility] Volume 85, Number 3, p 241    RISK OF MAJOR CARDIAC COMPLICATIONS FOLLOWING NONCARDIAC SURGERY (bolded applies tothis patient)    Assign one point for each of the following six risk factors:  1 point high risk surgical procedure:   (intraperitoneal, intrathoracic, suprainguinal vascular)   1 point history of MI   history of positive stress test  current chest pain due to myocardial ischemia  current nitrate treatment  Q waves   1 point history of CHF  history of pulmonary edema  history of paroxysmal nocturnal dyspnea  current BL rales  current S3 gallop  current CXR with pulmonary congestion   1 point history of cerebrovascular disease (CVA or TIA)   1 point preoperative tx with insulin   1 point preoperative creatinine >2     Score  Risk Index class Risk of major cardiac complications (%)  0  I   0.4  1  II   0.9  2  III   6.6  3 or more IV   11.0    taken from [facility] Volume 85, Number3, p 241      SURGICAL RISK CATEGORY (Bolded line indicates patient's category):  High   Cardiac risk >5% Aortic or other major vascular surgery        Peripheral vascular surgery  Intermediate  Cardiac risk 1-5% Carotid enterectomy        Head and neck surgery        Intraperitoneal or intrathoracic surgery        Orthopedic surgery        Prostate surgery  Low   Cardiac risk <1% Superficial procedures        Breast surgery        Cataract surgery        Endoscopic procedures        Most ambulatory surgeries    taken from  [facility] Volume 85,Number 3, p 241    ROS   Review of Systems   Constitutional: Negative for fatigue and fever. HENT: Negative for ear pain, sinus pressure, sneezing and sore throat. Eyes: Negative for pain. Respiratory: Negative for cough and shortness of breath. Cardiovascular: Negative for chest pain and leg swelling. Gastrointestinal: Negative for abdominal pain, constipation and diarrhea. Genitourinary: Negative for dysuria and urgency. Musculoskeletal: Negative for back pain and myalgias. Skin: Negative for rash. Allergic/Immunologic: Negative for food allergies. Neurological: Negative for light-headedness and headaches. Hematological: Does not bruise/bleed easily. Psychiatric/Behavioral: Negative for behavioral problems and sleep disturbance.        PAST MEDICAL HISTORY:  Patient Active Problem List   Diagnosis    Displacement of lumbar intervertebral disc without myelopathy    Spinal stenosis, lumbar region, without neurogenic claudication    S/P lumbar fusion (PLIF) L5-S1    Bilateral leg pain    Bulging lumbar disc L4-5    Chronic back pain    Chronic pain    Lumbar post-laminectomy syndrome    Neural foraminal stenosis of lumbar spine    [facility])    Insomnia    Stage 3 chronic kidney disease ([facility])    Shoulder impingement, left    Arthritis of right acromioclavicular joint    Shoulder impingement, right    CKD (chronic kidney disease) stage 4, GFR 15-29 ml/min ([facility])    Postprocedural hypoparathyroidism ([facility])    Traumatic complete tear of right rotator cuff    Impingement syndrome of right shoulder    Tear of right glenoid labrum       PAST SURGICAL HISTORY:  Past Surgical History:   Procedure Laterality Date    CARPAL TUNNEL RELEASE      two carpal tunnel surgeries    CHOLECYSTECTOMY      GALLBLADDER SURGERY      LUMBAR FUSION  2/1/2013    PLIF  L5 - S1    NOSE SURGERY      four nose surgeries    PARATHYROIDECTOMY Right 02/09/2018    ROTATOR CUFF REPAIR      SHOULDER ARTHROSCOPY Right 02/21/2020    SHOULDER ARTHROSCOPY Right 2/21/2020    RIGHT SHOULDER ARTHROSCOPY.  SUBACROMIAL DECOMPRESSION, LABRAL AND BICEP  DEBRIDEMENT, ROTATOR CUFF REPAIR performed by Dominick Kam DO at [address]:  Outpatient Medications Marked as Taking for the 11/3/21 encounter (Office Visit) with Master Parnell DO   Medication Sig Dispense Refill    Naproxen Sodium (ALEVE) 220 MG CAPS Take by mouth 1 tablet PRN      ibuprofen (ADVIL;MOTRIN) 200 MG tablet Take 200 mg by mouth every 6 hours as needed for Pain PRN      traZODone (DESYREL) 150 MG tablet Take one tab po nightly 90 tablet 0    gabapentin (NEURONTIN) 600 MG tablet TAKE 1 TABLET BY MOUTH 2 TIMES DAILY AS NEEDED FOR PAIN 60 tablet 0       ALLERGIES:  Allergies   Allergen Reactions    Penicillin G Hives and Nausea And Vomiting       SOCIAL HISTORY:  Social History     Tobacco Use    Smoking status: Current Every Day Smoker     Packs/day: 0.50     Years: 10.00     Pack years: 5.00     Types: Cigarettes    Smokeless tobacco: Never Used    Tobacco comment: quit smoking  11/2012   Substance Use Topics    Alcohol use: No       FAMILY HISTORY:  Family History   Problem Relation Age of Onset    Heart Disease Mother     Arthritis Mother     Diabetes Mother     High Blood Pressure Mother     Kidney Disease Mother     Heart Disease Father     High Blood Pressure Father     Arthritis Father     Stroke Father     Diabetes Sister     Arthritis Maternal Uncle     Diabetes Maternal Uncle     Kidney Disease Maternal Uncle     Arthritis Maternal Grandmother        IMMUNIZATIONS:  There is no immunization history for the selected administration types on file for this patient. OBJECTIVE/PYSICAL EXAMINATION:  /76 (Site: Left Upper Arm, Position: Sitting, Cuff Size: Medium Adult)   Pulse 69   Temp 97.8 °F (36.6 °C) (Temporal)   Resp 18   Ht 5' (1.524 m)   Wt 153 lb 12.8 oz (69.8 kg)   LMP 04/15/2015   SpO2 99%   BMI 30.04 kg/m²    Physical Exam  Vitals and nursing note reviewed. Constitutional:       Appearance: She is well-developed. HENT:      Head: Normocephalic and atraumatic. Right Ear: External ear normal.      Left Ear: External ear normal.      Nose: Nose normal.   Eyes:      Conjunctiva/sclera: Conjunctivae normal.   Neck:      Thyroid: No thyromegaly. Cardiovascular:      Rate and Rhythm: Normal rate and regular rhythm. Heart sounds: Normal heart sounds. No murmur heard. No friction rub. No gallop. Pulmonary:      Effort: Pulmonary effort is normal.      Breath sounds: Normal breath sounds. No wheezing. Abdominal:      Palpations: Abdomen is soft. There is no mass. Tenderness: There is no abdominal tenderness. There is no guarding or rebound. Musculoskeletal:         General: No tenderness. Normal range of motion. Cervical back: Normal range of motion and neck supple. Lymphadenopathy:      Cervical: No cervical adenopathy. Skin:     General: Skin is warm and dry. Findings: No rash.    Neurological:      Mental Status: She is alert and oriented to person, place, and time. Deep Tendon Reflexes: Reflexes are normal and symmetric. Psychiatric:         Behavior: Behavior normal.         ASSESSMENT/PLAN:     Diagnosis Orders   1. Pre-op examination  BASIC METABOLIC PANEL    CBC Auto Differential     Patient is at low risk for intermediate risk procedure and may proceed    HM reviewed today and updated as appropriate  Call or go to ED immediately if symptoms worsen or persist.  Future Appointments   Date Time Provider Jennifer Addison   11/24/2021 11:45 AM Brooke Marks MD [facility] [facility]   11/29/2021 10:20 AM DO [facility]     Or sooner if necessary. Counseled regarding above diagnosis, including possible risks and complications,  especially if left uncontrolled. Counseled regarding the possible side effects, risks, benefits and alternatives to treatment; patient and/or guardian verbalizes understanding. Advised patient to call with any new medication issues. All questions answered.     Yamini 5,   11/3/2021

## 2021-11-08 ENCOUNTER — HOSPITAL ENCOUNTER (OUTPATIENT)
Age: 56
Discharge: HOME | End: 2021-11-08
Payer: COMMERCIAL

## 2021-11-08 LAB
ANION GAP SERPL CALCULATED.3IONS-SCNC: 9 MMOL/L (ref 7–16)
BASOPHILS ABSOLUTE: 0.07 E9/L (ref 0–0.2)
BASOPHILS RELATIVE PERCENT: 1.2 % (ref 0–2)
BUN BLDV-MCNC: 14 MG/DL (ref 6–20)
CALCIUM SERPL-MCNC: 9.6 MG/DL (ref 8.6–10.2)
CHLORIDE BLD-SCNC: 105 MMOL/L (ref 98–107)
CO2: 28 MMOL/L (ref 22–29)
CREAT SERPL-MCNC: 1.3 MG/DL (ref 0.5–1)
EOSINOPHILS ABSOLUTE: 0.47 E9/L (ref 0.05–0.5)
EOSINOPHILS RELATIVE PERCENT: 8 % (ref 0–6)
GFR AFRICAN AMERICAN: 51
GFR NON-AFRICAN AMERICAN: 43 ML/MIN/1.73
GLUCOSE BLD-MCNC: 94 MG/DL (ref 74–99)
HCT VFR BLD CALC: 44.3 % (ref 34–48)
HEMOGLOBIN: 14.8 G/DL (ref 11.5–15.5)
IMMATURE GRANULOCYTES #: 0.01 E9/L
IMMATURE GRANULOCYTES %: 0.2 % (ref 0–5)
LYMPHOCYTES ABSOLUTE: 1.75 E9/L (ref 1.5–4)
LYMPHOCYTES RELATIVE PERCENT: 29.8 % (ref 20–42)
MCH RBC QN AUTO: 28.3 PG (ref 26–35)
MCHC RBC AUTO-ENTMCNC: 33.4 % (ref 32–34.5)
MCV RBC AUTO: 84.7 FL (ref 80–99.9)
MONOCYTES ABSOLUTE: 0.36 E9/L (ref 0.1–0.95)
MONOCYTES RELATIVE PERCENT: 6.1 % (ref 2–12)
NEUTROPHILS ABSOLUTE: 3.22 E9/L (ref 1.8–7.3)
NEUTROPHILS RELATIVE PERCENT: 54.7 % (ref 43–80)
PDW BLD-RTO: 12.4 FL (ref 11.5–15)
PLATELET # BLD: 142 E9/L (ref 130–450)
PMV BLD AUTO: 11.1 FL (ref 7–12)
POTASSIUM SERPL-SCNC: 4.4 MMOL/L (ref 3.5–5)
RBC # BLD: 5.23 E12/L (ref 3.5–5.5)
SODIUM BLD-SCNC: 142 MMOL/L (ref 132–146)
WBC # BLD: 5.9 E9/L (ref 4.5–11.5)

## 2021-11-08 PROCEDURE — 36415 COLL VENOUS BLD VENIPUNCTURE: CPT

## 2021-11-08 PROCEDURE — 85025 COMPLETE CBC W/AUTO DIFF WBC: CPT

## 2021-11-08 PROCEDURE — 80048 BASIC METABOLIC PNL TOTAL CA: CPT

## 2021-11-10 ENCOUNTER — ANESTHESIA EVENT (OUTPATIENT)
Dept: OPERATING ROOM | Age: 56
End: 2021-11-10
Payer: COMMERCIAL

## 2021-11-11 ASSESSMENT — LIFESTYLE VARIABLES: SMOKING_STATUS: 1

## 2021-11-11 NOTE — ANESTHESIA PRE PROCEDURE
Department of Anesthesiology  Preprocedure Note       Name:  Miller Benavidez   Age:  48 y.o.  :  1965                                          MRN:  [id number]         Date:  2021      Surgeon: Leena Gama): Tristian Merino DO    Procedure: Procedure(s):  LEFT SHOULDER ARTHROSCOPY, SUBACROMIAL DECOMPRESSION,  ROTATOR CUFF REPAIR WITH POSSIBLE BICEPS TENODESIS (CPT 16720, 92210) ([employer])    Medications prior to admission:   Prior to Admission medications    Medication Sig Start Date End Date Taking? Authorizing Provider   traZODone (DESYREL) 150 MG tablet Take one tab po nightly 11/3/21  Yes Ina Joy,    gabapentin (NEURONTIN) 600 MG tablet TAKE 1 TABLET BY MOUTH 2 TIMES DAILY AS NEEDED FOR PAIN  Patient taking differently: Take 600 mg by mouth daily. 11/3/21 5/9/22 Yes Anoop Simms, DO   Naproxen Sodium (ALEVE) 220 MG CAPS Take by mouth 1 tablet PRN    Historical Provider, MD   ibuprofen (ADVIL;MOTRIN) 200 MG tablet Take 200 mg by mouth every 6 hours as needed for Pain PRN    Historical Provider, MD       Current medications:    No current facility-administered medications for this encounter. Current Outpatient Medications   Medication Sig Dispense Refill    traZODone (DESYREL) 150 MG tablet Take one tab po nightly 90 tablet 1    gabapentin (NEURONTIN) 600 MG tablet TAKE 1 TABLET BY MOUTH 2 TIMES DAILY AS NEEDED FOR PAIN (Patient taking differently: Take 600 mg by mouth daily. ) 60 tablet 5    Naproxen Sodium (ALEVE) 220 MG CAPS Take by mouth 1 tablet PRN      ibuprofen (ADVIL;MOTRIN) 200 MG tablet Take 200 mg by mouth every 6 hours as needed for Pain PRN         Allergies:     Allergies   Allergen Reactions    Penicillin G Hives and Nausea And Vomiting       Problem List:    Patient Active Problem List   Diagnosis Code    Displacement of lumbar intervertebral disc without myelopathy M51.26    Spinal stenosis, lumbar region, without neurogenic claudication M48.061    S/P lumbar fusion (PLIF) L5-S1 Z98.1    Bilateral leg pain M79.604, M79.605    Bulging lumbar disc L4-5 M51.26    Chronic back pain M54.9, G89.29    Chronic pain G89.29    Lumbar post-laminectomy syndrome M96.1    Neural foraminal stenosis of lumbar spine M48.061    S/P par[facility]roidectomy ([facility]) E89.2    Insomnia G47.00    Stage 3 chronic kidney disease ([facility]) N18.30    Shoulder impingement, left M75.42    Arthritis of right acromioclavicular joint M19.011    Shoulder impingement, right M75.41    CKD (chronic kidney disease) stage 4, GFR 15-29 ml/min ([facility]) N18.4    Postprocedural hypoparathyroidism ([facility]) E89.2    Traumatic complete tear of right rotator cuff S46.011A    Impingement syndrome of right shoulder M75.41    Tear of right glenoid labrum S43.431A       Past Medical History:        Diagnosis Date    Chronic back pain     Chronic kidney disease     stage 3  follows with nephrology       Past Surgical History:        Procedure Laterality Date    CARPAL TUNNEL RELEASE Bilateral     CHOLECYSTECTOMY      GALLBLADDER SURGERY      LUMBAR FUSION  2/1/2013    PLIF  L5 - S1    NOSE SURGERY      four nose surgeries   ( d/t a fractures)    PARATHYROIDECTOMY Right 02/09/2018    ROTATOR CUFF REPAIR Right     total of 2  surgeries    SHOULDER ARTHROSCOPY Right 02/21/2020    SHOULDER ARTHROSCOPY Right 2/21/2020    RIGHT SHOULDER ARTHROSCOPY. SUBACROMIAL DECOMPRESSION, LABRAL AND BICEP  DEBRIDEMENT, ROTATOR CUFF REPAIR performed by Casey Lieberman DO at [address] History:    Social History     Tobacco Use    Smoking status: Current Every Day Smoker     Packs/day: 0.50     Years: 10.00     Pack years: 5.00     Types: Cigarettes    Smokeless tobacco: Never Used   Substance Use Topics    Alcohol use:  No                                Ready to quit: Not Answered  Counseling given: Not Answered      Vital Signs (Current):   Vitals:    11/05/21 1446   Weight: 153 lb (69.4 kg)   Height: 5' (1.524 m)                                              BP Readings from Last 3 Encounters:   11/03/21 113/76   10/13/21 124/82   02/11/21 94/62       NPO Status:  >8.H                                                                               BMI:   Wt Readings from Last 3 Encounters:   11/03/21 153 lb 12.8 oz (69.8 kg)   11/01/21 153 lb (69.4 kg)   10/13/21 153 lb (69.4 kg)     Body mass index is 29.88 kg/m². CBC:   Lab Results   Component Value Date    WBC 5.9 11/08/2021    RBC 5.23 11/08/2021    HGB 14.8 11/08/2021    HCT 44.3 11/08/2021    MCV 84.7 11/08/2021    RDW 12.4 11/08/2021     11/08/2021       CMP:   Lab Results   Component Value Date     11/08/2021    K 4.4 11/08/2021     11/08/2021    CO2 28 11/08/2021    BUN 14 11/08/2021    CREATININE 1.3 11/08/2021    GFRAA 51 11/08/2021    LABGLOM 43 11/08/2021    GLUCOSE 94 11/08/2021    PROT 7.4 06/01/2021    CALCIUM 9.6 11/08/2021    BILITOT 0.8 06/01/2021    ALKPHOS 66 06/01/2021    AST 23 06/01/2021    ALT 13 06/01/2021       POC Tests: No results for input(s): POCGLU, POCNA, POCK, POCCL, POCBUN, POCHEMO, POCHCT in the last 72 hours.     Coags:   Lab Results   Component Value Date    PROTIME 10.5 04/14/2015    PROTIME 11.9 12/01/2010    INR 1.0 04/14/2015    APTT 29.3 12/01/2010       HCG (If Applicable):   Lab Results   Component Value Date    PREGTESTUR NEGATIVE 04/30/2014        ABGs: No results found for: PHART, PO2ART, XCL8QHF, PVM1RVK, BEART, O1CJDVZY     Type & Screen (If Applicable):  No results found for: [facility]    Drug/Infectious Status (If Applicable):  Lab Results   Component Value Date    HEPCAB NON REACT 11/27/2012       COVID-19 Screening (If Applicable): No results found for: COVID19        Anesthesia Evaluation  Patient summary reviewed and Nursing notes reviewed no history of anesthetic complications:   Airway: Mallampati: II  TM distance: >3 FB   Neck ROM: full  Mouth opening: > = 3 FB Dental: Pulmonary: breath sounds clear to auscultation  (+) current smoker (5 pk yrs)          Patient did not smoke on day of surgery. Cardiovascular:  Exercise tolerance: good (>4 METS),           Rhythm: regular  Rate: normal                 ROS comment: 2020 EKG and Stress tests WNL    Cleared by PCP     Neuro/Psych:   (+) neuromuscular disease (lumbar fusion):,             GI/Hepatic/Renal:   (+) renal disease: CRI,           Endo/Other:                      ROS comment: parathyroidectomy Abdominal:         (-) obese       Vascular: Other Findings:           Anesthesia Plan      general and regional     ASA 3     (PCP clearance on chart  Possible IScB--requests; consents)  Induction: intravenous. BIS  MIPS: Postoperative opioids intended and Prophylactic antiemetics administered. Anesthetic plan and risks discussed with patient. Plan discussed with CRNA.                 Rosalva Veronica MD   11/11/2021

## 2021-11-12 ENCOUNTER — HOSPITAL ENCOUNTER (OUTPATIENT)
Age: 56
Setting detail: OUTPATIENT SURGERY
Discharge: HOME | End: 2021-11-12
Attending: ORTHOPAEDIC SURGERY | Admitting: ORTHOPAEDIC SURGERY
Payer: COMMERCIAL

## 2021-11-12 ENCOUNTER — ANESTHESIA (OUTPATIENT)
Dept: OPERATING ROOM | Age: 56
End: 2021-11-12
Payer: COMMERCIAL

## 2021-11-12 VITALS
RESPIRATION RATE: 13 BRPM | SYSTOLIC BLOOD PRESSURE: 105 MMHG | DIASTOLIC BLOOD PRESSURE: 72 MMHG | OXYGEN SATURATION: 100 % | TEMPERATURE: 95.4 F

## 2021-11-12 VITALS
HEIGHT: 60 IN | TEMPERATURE: 97.7 F | SYSTOLIC BLOOD PRESSURE: 130 MMHG | HEART RATE: 80 BPM | BODY MASS INDEX: 30.82 KG/M2 | WEIGHT: 157 LBS | DIASTOLIC BLOOD PRESSURE: 76 MMHG | RESPIRATION RATE: 16 BRPM | OXYGEN SATURATION: 96 %

## 2021-11-12 DIAGNOSIS — S46.219A BICEPS TENDON TEAR: Primary | ICD-10-CM

## 2021-11-12 PROBLEM — S46.012A TRAUMATIC COMPLETE TEAR OF LEFT ROTATOR CUFF: Status: ACTIVE | Noted: 2021-11-12

## 2021-11-12 PROBLEM — S43.432A TEAR OF LEFT GLENOID LABRUM: Status: ACTIVE | Noted: 2021-11-12

## 2021-11-12 PROBLEM — M75.42 IMPINGEMENT SYNDROME OF LEFT SHOULDER: Status: ACTIVE | Noted: 2021-11-12

## 2021-11-12 PROCEDURE — 2720000010 HC SURG SUPPLY STERILE: Performed by: ORTHOPAEDIC SURGERY

## 2021-11-12 PROCEDURE — 6360000002 HC RX W HCPCS: Performed by: ANESTHESIOLOGY

## 2021-11-12 PROCEDURE — 2580000003 HC RX 258: Performed by: ANESTHESIOLOGY

## 2021-11-12 PROCEDURE — 6360000002 HC RX W HCPCS: Performed by: ORTHOPAEDIC SURGERY

## 2021-11-12 PROCEDURE — 2709999900 HC NON-CHARGEABLE SUPPLY: Performed by: ORTHOPAEDIC SURGERY

## 2021-11-12 PROCEDURE — 2580000003 HC RX 258: Performed by: ORTHOPAEDIC SURGERY

## 2021-11-12 PROCEDURE — 64415 NJX AA&/STRD BRCH PLXS IMG: CPT | Performed by: ANESTHESIOLOGY

## 2021-11-12 PROCEDURE — C1713 ANCHOR/SCREW BN/BN,TIS/BN: HCPCS | Performed by: ORTHOPAEDIC SURGERY

## 2021-11-12 PROCEDURE — 3700000001 HC ADD 15 MINUTES (ANESTHESIA): Performed by: ORTHOPAEDIC SURGERY

## 2021-11-12 PROCEDURE — 29826 SHO ARTHRS SRG DECOMPRESSION: CPT | Performed by: ORTHOPAEDIC SURGERY

## 2021-11-12 PROCEDURE — 6370000000 HC RX 637 (ALT 250 FOR IP): Performed by: ANESTHESIOLOGY

## 2021-11-12 PROCEDURE — 2580000003 HC RX 258

## 2021-11-12 PROCEDURE — 29823 SHO ARTHRS SRG XTNSV DBRDMT: CPT | Performed by: ORTHOPAEDIC SURGERY

## 2021-11-12 PROCEDURE — 2500000003 HC RX 250 WO HCPCS: Performed by: NURSE PRACTITIONER

## 2021-11-12 PROCEDURE — 7100000011 HC PHASE II RECOVERY - ADDTL 15 MIN: Performed by: ORTHOPAEDIC SURGERY

## 2021-11-12 PROCEDURE — 29827 SHO ARTHRS SRG RT8TR CUF RPR: CPT | Performed by: ORTHOPAEDIC SURGERY

## 2021-11-12 PROCEDURE — 7100000000 HC PACU RECOVERY - FIRST 15 MIN: Performed by: ORTHOPAEDIC SURGERY

## 2021-11-12 PROCEDURE — 3600000014 HC SURGERY LEVEL 4 ADDTL 15MIN: Performed by: ORTHOPAEDIC SURGERY

## 2021-11-12 PROCEDURE — 3700000000 HC ANESTHESIA ATTENDED CARE: Performed by: ORTHOPAEDIC SURGERY

## 2021-11-12 PROCEDURE — 7100000010 HC PHASE II RECOVERY - FIRST 15 MIN: Performed by: ORTHOPAEDIC SURGERY

## 2021-11-12 PROCEDURE — 6360000002 HC RX W HCPCS

## 2021-11-12 PROCEDURE — 7100000001 HC PACU RECOVERY - ADDTL 15 MIN: Performed by: ORTHOPAEDIC SURGERY

## 2021-11-12 PROCEDURE — 3600000004 HC SURGERY LEVEL 4 BASE: Performed by: ORTHOPAEDIC SURGERY

## 2021-11-12 PROCEDURE — 2500000003 HC RX 250 WO HCPCS

## 2021-11-12 DEVICE — ANCHOR SUT L24.5MM DIA4.75MM BIOCOMPOSITE SELF PUNCHING: Type: IMPLANTABLE DEVICE | Site: SHOULDER | Status: FUNCTIONAL

## 2021-11-12 DEVICE — ANCHOR SUT L19.1MM DIA4.75MM BIOCOMPOSITE FULL THRD: Type: IMPLANTABLE DEVICE | Status: FUNCTIONAL

## 2021-11-12 RX ORDER — PROMETHAZINE HYDROCHLORIDE 25 MG/ML
25 INJECTION, SOLUTION INTRAMUSCULAR; INTRAVENOUS
Status: DISCONTINUED | OUTPATIENT
Start: 2021-11-12 | End: 2021-11-12 | Stop reason: HOSPADM

## 2021-11-12 RX ORDER — DEXAMETHASONE SODIUM PHOSPHATE 10 MG/ML
INJECTION, SOLUTION INTRAMUSCULAR; INTRAVENOUS PRN
Status: DISCONTINUED | OUTPATIENT
Start: 2021-11-12 | End: 2021-11-12 | Stop reason: SDUPTHER

## 2021-11-12 RX ORDER — HYDRALAZINE HYDROCHLORIDE 20 MG/ML
5 INJECTION INTRAMUSCULAR; INTRAVENOUS EVERY 10 MIN PRN
Status: DISCONTINUED | OUTPATIENT
Start: 2021-11-12 | End: 2021-11-12 | Stop reason: HOSPADM

## 2021-11-12 RX ORDER — FENTANYL CITRATE 50 UG/ML
50 INJECTION, SOLUTION INTRAMUSCULAR; INTRAVENOUS EVERY 5 MIN PRN
Status: DISCONTINUED | OUTPATIENT
Start: 2021-11-12 | End: 2021-11-12 | Stop reason: HOSPADM

## 2021-11-12 RX ORDER — SODIUM CHLORIDE, SODIUM LACTATE, POTASSIUM CHLORIDE, CALCIUM CHLORIDE 600; 310; 30; 20 MG/100ML; MG/100ML; MG/100ML; MG/100ML
INJECTION, SOLUTION INTRAVENOUS CONTINUOUS PRN
Status: DISCONTINUED | OUTPATIENT
Start: 2021-11-12 | End: 2021-11-12 | Stop reason: SDUPTHER

## 2021-11-12 RX ORDER — ROPIVACAINE HYDROCHLORIDE 5 MG/ML
INJECTION, SOLUTION EPIDURAL; INFILTRATION; PERINEURAL PRN
Status: DISCONTINUED | OUTPATIENT
Start: 2021-11-12 | End: 2021-11-12 | Stop reason: SDUPTHER

## 2021-11-12 RX ORDER — ROCURONIUM BROMIDE 10 MG/ML
INJECTION, SOLUTION INTRAVENOUS PRN
Status: DISCONTINUED | OUTPATIENT
Start: 2021-11-12 | End: 2021-11-12 | Stop reason: SDUPTHER

## 2021-11-12 RX ORDER — MORPHINE SULFATE 2 MG/ML
1 INJECTION, SOLUTION INTRAMUSCULAR; INTRAVENOUS EVERY 5 MIN PRN
Status: DISCONTINUED | OUTPATIENT
Start: 2021-11-12 | End: 2021-11-12 | Stop reason: HOSPADM

## 2021-11-12 RX ORDER — MIDAZOLAM HYDROCHLORIDE 1 MG/ML
INJECTION INTRAMUSCULAR; INTRAVENOUS PRN
Status: DISCONTINUED | OUTPATIENT
Start: 2021-11-12 | End: 2021-11-12 | Stop reason: SDUPTHER

## 2021-11-12 RX ORDER — HYDROCODONE BITARTRATE AND ACETAMINOPHEN 5; 325 MG/1; MG/1
1 TABLET ORAL PRN
Status: COMPLETED | OUTPATIENT
Start: 2021-11-12 | End: 2021-11-12

## 2021-11-12 RX ORDER — OXYCODONE HYDROCHLORIDE AND ACETAMINOPHEN 5; 325 MG/1; MG/1
2 TABLET ORAL EVERY 4 HOURS PRN
Status: DISCONTINUED | OUTPATIENT
Start: 2021-11-12 | End: 2021-11-12 | Stop reason: HOSPADM

## 2021-11-12 RX ORDER — CLINDAMYCIN PHOSPHATE 900 MG/50ML
900 INJECTION INTRAVENOUS ONCE
Status: COMPLETED | OUTPATIENT
Start: 2021-11-12 | End: 2021-11-12

## 2021-11-12 RX ORDER — NEOSTIGMINE METHYLSULFATE 1 MG/ML
INJECTION, SOLUTION INTRAVENOUS PRN
Status: DISCONTINUED | OUTPATIENT
Start: 2021-11-12 | End: 2021-11-12 | Stop reason: SDUPTHER

## 2021-11-12 RX ORDER — HYDROCODONE BITARTRATE AND ACETAMINOPHEN 5; 325 MG/1; MG/1
2 TABLET ORAL PRN
Status: COMPLETED | OUTPATIENT
Start: 2021-11-12 | End: 2021-11-12

## 2021-11-12 RX ORDER — DIPHENHYDRAMINE HYDROCHLORIDE 50 MG/ML
12.5 INJECTION INTRAMUSCULAR; INTRAVENOUS
Status: COMPLETED | OUTPATIENT
Start: 2021-11-12 | End: 2021-11-12

## 2021-11-12 RX ORDER — CLINDAMYCIN HYDROCHLORIDE 300 MG/1
300 CAPSULE ORAL 3 TIMES DAILY
Qty: 10 CAPSULE | Refills: 0 | Status: SHIPPED | OUTPATIENT
Start: 2021-11-12 | End: 2021-11-16

## 2021-11-12 RX ORDER — SODIUM CHLORIDE, SODIUM LACTATE, POTASSIUM CHLORIDE, CALCIUM CHLORIDE 600; 310; 30; 20 MG/100ML; MG/100ML; MG/100ML; MG/100ML
INJECTION, SOLUTION INTRAVENOUS CONTINUOUS
Status: DISCONTINUED | OUTPATIENT
Start: 2021-11-12 | End: 2021-11-12 | Stop reason: HOSPADM

## 2021-11-12 RX ORDER — OXYCODONE AND ACETAMINOPHEN 7.5; 325 MG/1; MG/1
1 TABLET ORAL EVERY 6 HOURS PRN
Qty: 28 TABLET | Refills: 0 | Status: SHIPPED | OUTPATIENT
Start: 2021-11-12 | End: 2021-11-19

## 2021-11-12 RX ORDER — GLYCOPYRROLATE 1 MG/5 ML
SYRINGE (ML) INTRAVENOUS PRN
Status: DISCONTINUED | OUTPATIENT
Start: 2021-11-12 | End: 2021-11-12 | Stop reason: SDUPTHER

## 2021-11-12 RX ORDER — FENTANYL CITRATE 50 UG/ML
INJECTION, SOLUTION INTRAMUSCULAR; INTRAVENOUS PRN
Status: DISCONTINUED | OUTPATIENT
Start: 2021-11-12 | End: 2021-11-12 | Stop reason: SDUPTHER

## 2021-11-12 RX ORDER — LABETALOL HYDROCHLORIDE 5 MG/ML
5 INJECTION, SOLUTION INTRAVENOUS EVERY 10 MIN PRN
Status: DISCONTINUED | OUTPATIENT
Start: 2021-11-12 | End: 2021-11-12 | Stop reason: HOSPADM

## 2021-11-12 RX ORDER — ONDANSETRON 2 MG/ML
INJECTION INTRAMUSCULAR; INTRAVENOUS PRN
Status: DISCONTINUED | OUTPATIENT
Start: 2021-11-12 | End: 2021-11-12 | Stop reason: SDUPTHER

## 2021-11-12 RX ORDER — MEPERIDINE HYDROCHLORIDE 25 MG/ML
12.5 INJECTION INTRAMUSCULAR; INTRAVENOUS; SUBCUTANEOUS EVERY 5 MIN PRN
Status: DISCONTINUED | OUTPATIENT
Start: 2021-11-12 | End: 2021-11-12 | Stop reason: HOSPADM

## 2021-11-12 RX ORDER — KETAMINE HYDROCHLORIDE 50 MG/ML
INJECTION, SOLUTION, CONCENTRATE INTRAMUSCULAR; INTRAVENOUS PRN
Status: DISCONTINUED | OUTPATIENT
Start: 2021-11-12 | End: 2021-11-12 | Stop reason: SDUPTHER

## 2021-11-12 RX ORDER — PROPOFOL 10 MG/ML
INJECTION, EMULSION INTRAVENOUS PRN
Status: DISCONTINUED | OUTPATIENT
Start: 2021-11-12 | End: 2021-11-12 | Stop reason: SDUPTHER

## 2021-11-12 RX ADMIN — KETAMINE HYDROCHLORIDE 10 MG: 50 INJECTION INTRAMUSCULAR; INTRAVENOUS at 11:12

## 2021-11-12 RX ADMIN — Medication 0.1 MG: at 11:19

## 2021-11-12 RX ADMIN — ONDANSETRON 4 MG: 2 INJECTION INTRAMUSCULAR; INTRAVENOUS at 12:16

## 2021-11-12 RX ADMIN — PROPOFOL 150 MG: 10 INJECTION, EMULSION INTRAVENOUS at 11:12

## 2021-11-12 RX ADMIN — DEXAMETHASONE SODIUM PHOSPHATE 10 MG: 10 INJECTION, SOLUTION INTRAMUSCULAR; INTRAVENOUS at 11:27

## 2021-11-12 RX ADMIN — HYDROCODONE BITARTRATE AND ACETAMINOPHEN 2 TABLET: 5; 325 TABLET ORAL at 13:30

## 2021-11-12 RX ADMIN — FENTANYL CITRATE 50 MCG: 50 INJECTION, SOLUTION INTRAMUSCULAR; INTRAVENOUS at 10:55

## 2021-11-12 RX ADMIN — FENTANYL CITRATE 50 MCG: 50 INJECTION, SOLUTION INTRAMUSCULAR; INTRAVENOUS at 12:34

## 2021-11-12 RX ADMIN — FENTANYL CITRATE 50 MCG: 50 INJECTION, SOLUTION INTRAMUSCULAR; INTRAVENOUS at 11:05

## 2021-11-12 RX ADMIN — ROPIVACAINE HYDROCHLORIDE 30 ML: 5 INJECTION, SOLUTION EPIDURAL; INFILTRATION; PERINEURAL at 11:10

## 2021-11-12 RX ADMIN — CLINDAMYCIN PHOSPHATE 900 MG: 18 INJECTION, SOLUTION INTRAVENOUS at 10:59

## 2021-11-12 RX ADMIN — SODIUM CHLORIDE, POTASSIUM CHLORIDE, SODIUM LACTATE AND CALCIUM CHLORIDE: 600; 310; 30; 20 INJECTION, SOLUTION INTRAVENOUS at 10:02

## 2021-11-12 RX ADMIN — ROCURONIUM BROMIDE 40 MG: 10 SOLUTION INTRAVENOUS at 11:12

## 2021-11-12 RX ADMIN — PROPOFOL 40 MG: 10 INJECTION, EMULSION INTRAVENOUS at 12:20

## 2021-11-12 RX ADMIN — MIDAZOLAM 2 MG: 1 INJECTION INTRAMUSCULAR; INTRAVENOUS at 10:55

## 2021-11-12 RX ADMIN — FENTANYL CITRATE 50 MCG: 50 INJECTION, SOLUTION INTRAMUSCULAR; INTRAVENOUS at 10:59

## 2021-11-12 RX ADMIN — SODIUM CHLORIDE, POTASSIUM CHLORIDE, SODIUM LACTATE AND CALCIUM CHLORIDE: 600; 310; 30; 20 INJECTION, SOLUTION INTRAVENOUS at 10:55

## 2021-11-12 RX ADMIN — Medication 0.6 MG: at 12:20

## 2021-11-12 RX ADMIN — Medication 3 MG: at 12:20

## 2021-11-12 RX ADMIN — FENTANYL CITRATE 50 MCG: 50 INJECTION, SOLUTION INTRAMUSCULAR; INTRAVENOUS at 11:12

## 2021-11-12 RX ADMIN — SODIUM CHLORIDE, POTASSIUM CHLORIDE, SODIUM LACTATE AND CALCIUM CHLORIDE: 600; 310; 30; 20 INJECTION, SOLUTION INTRAVENOUS at 12:37

## 2021-11-12 RX ADMIN — DIPHENHYDRAMINE HYDROCHLORIDE 12.5 MG: 50 INJECTION, SOLUTION INTRAMUSCULAR; INTRAVENOUS at 12:16

## 2021-11-12 ASSESSMENT — PULMONARY FUNCTION TESTS
PIF_VALUE: 16
PIF_VALUE: 0
PIF_VALUE: 21
PIF_VALUE: 17
PIF_VALUE: 2
PIF_VALUE: 0
PIF_VALUE: 0
PIF_VALUE: 23
PIF_VALUE: 18
PIF_VALUE: 17
PIF_VALUE: 16
PIF_VALUE: 17
PIF_VALUE: 14
PIF_VALUE: 17
PIF_VALUE: 2
PIF_VALUE: 16
PIF_VALUE: 17
PIF_VALUE: 0
PIF_VALUE: 2
PIF_VALUE: 16
PIF_VALUE: 17
PIF_VALUE: 16
PIF_VALUE: 17
PIF_VALUE: 17
PIF_VALUE: 16
PIF_VALUE: 17
PIF_VALUE: 0
PIF_VALUE: 17
PIF_VALUE: 17
PIF_VALUE: 16
PIF_VALUE: 19
PIF_VALUE: 0
PIF_VALUE: 17
PIF_VALUE: 16
PIF_VALUE: 15
PIF_VALUE: 0
PIF_VALUE: 5
PIF_VALUE: 17
PIF_VALUE: 17
PIF_VALUE: 0
PIF_VALUE: 1
PIF_VALUE: 17
PIF_VALUE: 0
PIF_VALUE: 17
PIF_VALUE: 16
PIF_VALUE: 17
PIF_VALUE: 15
PIF_VALUE: 2
PIF_VALUE: 17
PIF_VALUE: 1
PIF_VALUE: 0
PIF_VALUE: 18
PIF_VALUE: 14
PIF_VALUE: 17
PIF_VALUE: 28
PIF_VALUE: 0
PIF_VALUE: 17
PIF_VALUE: 17
PIF_VALUE: 0
PIF_VALUE: 17
PIF_VALUE: 16
PIF_VALUE: 17
PIF_VALUE: 18
PIF_VALUE: 17
PIF_VALUE: 17
PIF_VALUE: 0
PIF_VALUE: 0
PIF_VALUE: 11
PIF_VALUE: 17
PIF_VALUE: 2
PIF_VALUE: 16
PIF_VALUE: 0
PIF_VALUE: 17
PIF_VALUE: 17
PIF_VALUE: 18
PIF_VALUE: 21
PIF_VALUE: 17
PIF_VALUE: 16
PIF_VALUE: 12
PIF_VALUE: 16
PIF_VALUE: 17
PIF_VALUE: 2
PIF_VALUE: 17
PIF_VALUE: 16
PIF_VALUE: 17
PIF_VALUE: 20
PIF_VALUE: 0
PIF_VALUE: 17

## 2021-11-12 ASSESSMENT — PAIN SCALES - GENERAL
PAINLEVEL_OUTOF10: 5
PAINLEVEL_OUTOF10: 0
PAINLEVEL_OUTOF10: 5
PAINLEVEL_OUTOF10: 0
PAINLEVEL_OUTOF10: 0

## 2021-11-12 ASSESSMENT — PAIN - FUNCTIONAL ASSESSMENT: PAIN_FUNCTIONAL_ASSESSMENT: 0-10

## 2021-11-12 NOTE — OP NOTE
Operative Note      Patient: Miller Benavidez  YOB: 1965  MRN: [id number]    Date of Procedure: 11/12/2021    Pre-Op Diagnosis: LEFT SHOULDER ROTATOR CUFF TEAR, IMPINGEMENT    Post-Op Diagnosis: Same       Procedure(s):  LEFT SHOULDER ARTHROSCOPY, SUBACROMIAL DECOMPRESSION,  ROTATOR CUFF REPAIR  (CPT G417029, 75661) Queta Downey)    Surgeon(s): Tristian Merino DO    Assistant:   Resident: Tavo Juan DO    Anesthesia: General    Estimated Blood Loss (mL): Minimal    Complications: None    Specimens:   * No specimens in log *    Implants:  Implant Name Type Inv. Item Serial No.  Lot No. LRB No. Used Action   ANCHOR SUT L19.1MM DIA4. 75MM BIOCOMPOSITE FULL THRD  ANCHOR SUT L19.1MM DIA4. 75MM BIOCOMPOSITE FULL THRD  [id number] Left 1 Implanted   ANCHOR SUT L24.5MM DIA4. 75MM BIOCOMPOSITE SELF PUNCHING  ANCHOR SUT L24.5MM DIA4. 75MM BIOCOMPOSITE SELF PUNCHING  [id number] Left 1 Implanted         Drains: * No LDAs found *    Findings: as above    Detailed Description of Procedure:   Below    PREOPERATIVE DIAGNOSES: (1) left shoulder rotator cuff tear. (2)   Subacromial impingement syndrome . (3) labral/biceps tear  POSTOPERATIVE DIAGNOSES: (1) left shoulder rotator cuff tear. (2)   Subacromial impingement. (3) labral/biceps tear  OPERATION: (1)left shoulder arthroscopy with arthroscopic rotator cuff   repair. (2) Subacromial arch decompression. (3) labral/biceps debridement  ANESTHESIA: General   Surgeon: bharat   Assistant:as above  ESTIMATED BLOOD LOSS: Minimal  COMPLICATIONS: None. OPERATIVE IMPLANTS:2 swivel lock anchors    Brief Hospital Course: Miller Benavidez is a patient known to Gene Wu DO's practice with persistent complaints of shoulder pain. shoulder pain has failed to be relieved by non-operative conservative measures, and has began affecting daily activities of living.  After examination of the patient, review of the radiologic studies, and appropriate pre-operative risk assessment, Sachin Puckett,  recommended shoulder arthroscopy, which the patient was agreeable towards. Operative Procedure:   I positioned the patient in the lateral decubitus position on a   beanbag, hung 10 pounds traction from the  arm, prepped and draped the   arm in sterile fashion. I outlined an incision along the posterior, lateral, and anterior   acromion. I made an incision in the posterior side   of the shoulder, placed a blunt trocar within the glenohumeral joint and   carried out a diagnostic arthroscopy. Patient had a normal  glenohumeral   surface, glenoid and humeral head were in good. Patient didhave a tear involving the biceps, and did have a labral tear. The labrum and biceps were debrided. There was evidence of a full-thickness tear   from the undersurface looking up into the subacromial space. There was a partial thickness rotator cuff tear. I did debride the rotator cuff tendon. I then went to the subacromial space, I completed a complete subacromial   bursectomy removing all bursal tissue from the subacromial space. Using an [employer]   electrode, I outlined the acromial borders. I then carried out an anterior inferior   Acromioplasty, using a 5.0 barrel salena, smoothing the hooked acromion to a flat type 1 contour. I then prepared the footprint of the greater tuberosity for implantation of the tendon down to nice bleeding bone. Tendon was debrided and cleaned up. The cuff tear measured about 1-2 cm in dimension and was full-thickness. I then choose to peform a double row repair. I then went to repair the rotator cuff, I placed 1 medial row anchors at the articular margin. Using an Aisle50 suture passer, I passed stitches within the cuff   Tear approximately 17 mm from edge of tendon. I grabbed the stitch out through the superior lateral portal where I made the anchor portal trough.  I then cut our fused stitches, and placed 1 lateral row anchor lateral to the greater tuberosity. This was impacted into position. The cuff was reapproximated to its bed, had great fixation on   the cuff by pulling tension on the stitches and screwed the anchor into position. Thus reapproximating the tendon to its  bed. The cuff was nicely approximated to its bed and had no abnormalities. I did thoroughly irrigate the wound upon closure, I checked the cuff for stability using a hook probe. The tendon repair was snug and well approximated to its footprint. I then removed all fluid from the shoulder joint and closed the incision with 4-0 Prolene in a horizontal mattress-type fashion. Sterile dressing was placed on the wound. Patient placed in a splint. The Patient tolerated the procedure well, woke up in the   recovery room without difficulty.        Electronically signed by Facundo Duron DO on 11/12/2021 at 2:21 PM

## 2021-11-12 NOTE — H&P
Updated H&P    Chief Complaint   Patient presents with    Shoulder Pain       Left shoulder F/U MRI results.          Radha Grimm is here today for her MRI f/u. She continues to have left shoulder pain that is effecting her ADL's and the ability to perform any work.          Chief Complaint   Patient presents with    Shoulder Pain       Left shoulder F/U MRI results.       Past Medical History        Past Medical History:   Diagnosis Date    Chronic back pain      Chronic kidney disease       stage 3         Past Surgical History         Past Surgical History:   Procedure Laterality Date    CARPAL TUNNEL RELEASE         two carpal tunnel surgeries    CHOLECYSTECTOMY        GALLBLADDER SURGERY        LUMBAR FUSION   2/1/2013     PLIF  L5 - S1    NOSE SURGERY         four nose surgeries    PARATHYROIDECTOMY Right 02/09/2018    ROTATOR CUFF REPAIR        SHOULDER ARTHROSCOPY Right 02/21/2020    SHOULDER ARTHROSCOPY Right 2/21/2020     RIGHT SHOULDER ARTHROSCOPY. SUBACROMIAL DECOMPRESSION, LABRAL AND BICEP  DEBRIDEMENT, ROTATOR CUFF REPAIR performed by Dominick Kam DO at [address]               Current Medication      Current Outpatient Medications:     Naproxen Sodium (ALEVE) 220 MG CAPS, Take by mouth 1 tablet PRN, Disp: , Rfl:     ibuprofen (ADVIL;MOTRIN) 200 MG tablet, Take 200 mg by mouth every 6 hours as needed for Pain PRN, Disp: , Rfl:     traZODone (DESYREL) 150 MG tablet, Take one tab po nightly, Disp: 90 tablet, Rfl: 0    gabapentin (NEURONTIN) 600 MG tablet, TAKE 1 TABLET BY MOUTH 2 TIMES DAILY AS NEEDED FOR PAIN, Disp: 60 tablet, Rfl: 0          Allergies   Allergen Reactions    Penicillin G Hives and Nausea And Vomiting      Social History               Socioeconomic History    Marital status:         Spouse name: Not on file    Number of children: Not on file    Years of education: Not on file    Highest education level: Not on file Occupational History    Not on file   Tobacco Use    Smoking status: Current Every Day Smoker       Packs/day: 0.50       Years: 10.00       Pack years: 5.00       Types: Cigarettes    Smokeless tobacco: Never Used    Tobacco comment: quit smoking  11/2012   Vaping Use    Vaping Use: Never used   Substance and Sexual Activity    Alcohol use: No    Drug use: No    Sexual activity: Not on file   Other Topics Concern    Not on file   Social History Narrative    Not on file      Social Determinants of Health          Financial Resource Strain:     Difficulty of Paying Living Expenses:    Food Insecurity:     Worried About Running Out of Food in the Last Year:     [address] in the Last Year:    Transportation Needs:     Lack of Transportation (Medical):      Lack of Transportation (Non-Medical):    Physical Activity:     Days of Exercise per Week:     Minutes of Exercise per Session:    Stress:     Feeling of Stress :    Social Connections:     Frequency of Communication with Friends and Family:     Frequency of Social Gatherings with Friends and Family:     Attends Rastafari Services:     Active Member of Clubs or Organizations:     Attends Club or Organization Meetings:     Marital Status:    Intimate Partner Violence:     Fear of Current or Ex-Partner:     Emotionally Abused:     Physically Abused:     Sexually Abused:          Family History         Family History   Problem Relation Age of Onset    Heart Disease Mother      Arthritis Mother      Diabetes Mother      High Blood Pressure Mother      Kidney Disease Mother      Heart Disease Father      High Blood Pressure Father      Arthritis Father      Stroke Father      Diabetes Sister      Arthritis Maternal Uncle      Diabetes Maternal Uncle      Kidney Disease Maternal Uncle      Arthritis Maternal Grandmother              REVIEW OF SYSTEMS:      General/Constitution:  (-)weight loss, (-)fever, (-)chills, (-)weakness. Skin: (-) rash,(-) psoriasis,(-) eczema, (-)skin cancer. Musculoskeletal: (-) fractures,  (-) dislocations,(-) collagen vascular disease, (-) fibromyalgia, (-) multiple sclerosis, (-) muscular dystrophy, (-) RSD,(-) joint pain (-)swelling, (-) joint pain,swelling. Neurologic: (-) epilepsy, (-)seizures,(-) brain tumor,(-) TIA, (-)stroke, (-)headaches, (-)Parkinson disease,(-) memory loss, (-) LOC. Cardiovascular: (-) Chest pain, (-) swelling in legs/feet, (-) SOB, (-) cramping in legs/feet with walking. Respiratory: (-) SOB, (-) Coughing, (-) night sweats. GI: (-) nausea, (-) vomiting, (-) diarrhea, (-) blood in stool, (-) gastric ulcer. Psychiatric: (-) Depression, (-) Anxiety, (-) bipolar disease, (-) Alzheimer's Disease  Allergic/Immunologic: (-) allergies latex, (-) allergies metal, (-) skin sensitivity. Hematlogic: (-) anemia, (-) blood transfusion, (-) DVT/PE, (-) Clotting disorders        Subjective:     Constitution:      In general, patient is awake, alert and oriented X3, in no apparent distress.  Examination of HENT reveals normocephalic, atraumatic.  PERRLA/EOMI sclera are white.  Conjunctivae are clear.  TM's are intact.  Pharynx is pink and moist.  Uvula and tongue are midline.  Heart: Positive S1 and positive S2 with regular rate and rhythm.  Lungs: Clear to auscultation bilaterally without rales, rhonchi or wheezes.  Abdomen: soft, nontender.  Positive bowel sounds.  No organomegaly.  No guarding or rigidity.        Psycihatric:  The patient is alert and oriented x 3, appears to be stated age and in no distress. /77   Pulse 63   Temp 97.2 °F (36.2 °C) (Skin)   Resp 18   Ht 5' (1.524 m)   Wt 157 lb (71.2 kg)   LMP 04/15/2015   SpO2 100%   BMI 30.66 kg/m²        Respiratory:  Respiratory effort is not labored. Patient is not gasping. Palpation of the chest reveals no tactile fremitus.     Skin:  Upon inspection: the skin appears warm, dry and intact.   There is not a previous scar over the affected area. There is not any cellulitis, lymphedema or cutaneous lesions noted in the lower extremities. Upon palpation there is no induration noted.       Neurologic:  Motor exam of the upper extremities show: The reflexes in biceps/triceps/brachioradialis are equal and symmetric. Sensory exam C5-T1 are normal bilaterally. .         Cardiovascular: The vascular exam is normal and is well perfused to distal extremities. There are 2+ radial pulses bilaterally, and motor and sensation is intact to median, ulnar, and radial, musclocutaneus, and axillary nerve distribution and grossly symmetric bilaterally. There is cap refill noted less than two seconds in all digits. There is not edema of the bilateral upper extremities. There is not varicosities noted in the distal extremities.       Lymph:  Upon palpation,  there is no lymphadenopathy noted in bilateral upper extremities.       Musculoskeletal:  Gait: normal; examination of the nails and digits reveal no cyanosis or clubbing.        Cervical Exam:  On physical exam, Rudy Gavin is well-developed, well-nourished, oriented to person, place and time. her gait is normal.  On evaluation of hercervical spine, She has limited range of motion of the cervical spine with pain. There is cervical tenderness to palpation.      Shoulder Exam:  On evaluation of her bilaterally upper extremities, her left shoulder has no deformity. There is tenderness upon palpation of the bicep tendon and anterior shoulder. There is not evidence of scapular dyskinesis. There is not muscle atrophy in shoulder girdle. The range of motion for the Right Shoulder is 150/50/t8 and for the Left shoulder is 140/45/t10.   Right shoulder Motor strength is 5/5 in the supraspinatus, 5/5 internal rotation and 5/5 in external rotation, and Left shoulder motor strength 5/5 in supraspinatus, 5/5 in internal rotation, 5/5 in external rotation.         Right shoulder:  negative Impingement , negative Whitley ,negative  Speeds,negative  Apprehension ,negative Dean Load Shift, negative Binta manuver, negative Cross arm test.      Left shoulder:  negative Impingement , negative Whitley ,positive Speeds,negative  Apprehension ,negative Dean Load Shift, positiveSperling manuver, negative Cross arm test.      XRAY:  n/a     MRI:    1. Retracted full-thickness tearing of mid and posterior supraspinatus with   retraction of the torn fibers from the footplate measuring up to 1.9 cm.   2. Less than 50% multifocal partial-thickness articular-surface and   interstitial tearing of infraspinatus between musculotendinous junction and   footplate.  Moderate underlying infraspinatus tendinopathy. 3. Mild tendinosis of the intra-articular long head of the biceps tendon. 4. Mild diffuse labral degeneration.  Mild glenohumeral chondromalacia. Moderate glenohumeral joint effusion. 5. Mild degenerative change of the left AC joint.            Radiographic findings reviewed with patient     Impression:        Encounter Diagnoses   Name Primary?  Shoulder impingement syndrome, left Yes    Traumatic complete tear of left rotator cuff, initial encounter           Plan: Natural history and expected course discussed. Questions answered. Educational material distributed. Reduction in offending activity. Gentle ROM exercises  RICE therapy. I had a lengthy discussion with the patient regarding their diagnosis. I explained treatment options including surgical vs non surgical treatment. I reviewed in detail the risks and benefits and outlined the procedure in detail with expected outcomes and possible complications. I also discussed non surgical treatment such as injections (CSI), physical therapy, topical creams and NSAID's. They have elected for surgical management at this time.      I discussed the risks and benefits of the shoulder arthroscopy with the patient.   The risks include but are not limited to: infection, injuries to blood vessels and nerves, non relief of symptoms, intraoperative fracture, need for further operative intervention, blood loss, arthrofibrosis of shoulder, DVT/PE, MI and death. The patient understands these risks and wishes to proceed with surgery. I will perform a Left shoulder arthroscopy,RTC, possible bicep tenodesis, SAD and debridement 11/12/2021.     The patient was counseled at length about the risks of shruti Covid-19 during their perioperative period and any recovery window from their procedure.  The patient was made aware that shruti Covid-19  may worsen their prognosis for recovering from their procedure  and lend to a higher morbidity and/or mortality risk.  All material risks, benefits, and reasonable alternatives including postponing the procedure were discussed. The patient does wish to proceed with the procedure at this time.

## 2021-11-12 NOTE — ANESTHESIA POSTPROCEDURE EVALUATION
Department of Anesthesiology  Postprocedure Note    Patient: Cristy King  MRN: [id number]  YOB: 1965  Date of evaluation: 11/12/2021  Time:  2:08 PM     Procedure Summary     Date: 11/12/21 Room / Location: [address] [place] [address] 01 / [facility]    Anesthesia Start: 9652 Anesthesia Stop: 4026    Procedure: LEFT SHOULDER ARTHROSCOPY, SUBACROMIAL DECOMPRESSION,  ROTATOR CUFF REPAIR  (CPT 71765, 41139) ([address]) (Left ) Diagnosis: (LEFT SHOULDER ROTATOR CUFF TEAR, IMPINGEMENT)    Surgeons: mAara Strickland DO Responsible Provider: Maria Luz Verma MD    Anesthesia Type: general, regional ASA Status: 3          Anesthesia Type: general, regional    Omar Phase I: Omar Score: 10    Omar Phase II: Omar Score: 10    Last vitals: Reviewed and per EMR flowsheets. Anesthesia Post Evaluation    Patient location during evaluation: PACU  Patient participation: complete - patient participated  Level of consciousness: awake and alert  Airway patency: patent  Nausea & Vomiting: no nausea and no vomiting  Complications: no  Cardiovascular status: blood pressure returned to baseline  Respiratory status: room air and spontaneous ventilation  Hydration status: stable  Comments: Good analgesia from block. Post block precautions given.

## 2021-11-12 NOTE — ANESTHESIA PROCEDURE NOTES
Peripheral Block    Patient location during procedure: pre-op  Staffing  Performed: other anesthesia staff   Anesthesiologist: Jim Shields MD  Resident/CRNA: Erica Gauthier, [address]  Other anesthesia staff: Guerita Andersen RN  Preanesthetic Checklist  Completed: patient identified, IV checked, site marked, risks and benefits discussed, surgical consent, monitors and equipment checked, pre-op evaluation, timeout performed, anesthesia consent given, oxygen available and patient being monitored  Peripheral Block  Patient position: supine  Prep: ChloraPrep  Patient monitoring: cardiac monitor, continuous pulse ox, frequent blood pressure checks and IV access  Block type: Brachial plexus  Laterality: left  Injection technique: single-shot  Guidance: ultrasound guided  Local infiltration: bupivacaine  Infiltration strength: 0.5 %  Dose: 30 mL  Interscalene  Provider prep: mask and sterile gloves  Local infiltration: bupivacaine  Needle  Needle gauge: 20 G  Needle length: 5 cm  Needle localization: ultrasound guidance  Assessment  Injection assessment: negative aspiration for heme, no paresthesia on injection and local visualized surrounding nerve on ultrasound  Paresthesia pain: none  Slow fractionated injection: yes  Hemodynamics: stable  Additional Notes   (1) Under ultrasound guidance, a  gauge needle was inserted and placed in close proximity to C5, 6, & 7  nerve.  (2) Ultrasound was also used to visualize the spread of the anesthetic in close proximity to the nerve being blocked. (3) The nerve appeared anatomically normal, and (4 there were no apparent abnormal pathological findings on the image that were readily visible and related to the nerve being blocked. (5) A permanent ultrasound image was saved in the patient's record.             Reason for block: post-op pain management and at surgeon's request

## 2021-11-24 ENCOUNTER — OFFICE VISIT (OUTPATIENT)
Dept: PAIN MANAGEMENT | Age: 56
End: 2021-11-24
Payer: COMMERCIAL

## 2021-11-24 VITALS
BODY MASS INDEX: 29.84 KG/M2 | HEART RATE: 60 BPM | DIASTOLIC BLOOD PRESSURE: 70 MMHG | HEIGHT: 60 IN | WEIGHT: 152 LBS | TEMPERATURE: 96.9 F | RESPIRATION RATE: 16 BRPM | SYSTOLIC BLOOD PRESSURE: 104 MMHG

## 2021-11-24 DIAGNOSIS — M96.1 POSTLAMINECTOMY SYNDROME, LUMBAR: ICD-10-CM

## 2021-11-24 DIAGNOSIS — F17.200 SMOKING: ICD-10-CM

## 2021-11-24 DIAGNOSIS — M54.16 LUMBAR RADICULOPATHY: ICD-10-CM

## 2021-11-24 DIAGNOSIS — S46.012A TRAUMATIC COMPLETE TEAR OF LEFT ROTATOR CUFF, INITIAL ENCOUNTER: Primary | ICD-10-CM

## 2021-11-24 DIAGNOSIS — M54.2 CERVICALGIA: ICD-10-CM

## 2021-11-24 DIAGNOSIS — M51.36 DDD (DEGENERATIVE DISC DISEASE), LUMBAR: Primary | ICD-10-CM

## 2021-11-24 DIAGNOSIS — M53.3 SACROILIAC DYSFUNCTION: ICD-10-CM

## 2021-11-24 DIAGNOSIS — M47.817 LUMBOSACRAL SPONDYLOSIS WITHOUT MYELOPATHY: ICD-10-CM

## 2021-11-24 PROCEDURE — 3017F COLORECTAL CA SCREEN DOC REV: CPT | Performed by: ANESTHESIOLOGY

## 2021-11-24 PROCEDURE — 4004F PT TOBACCO SCREEN RCVD TLK: CPT | Performed by: ANESTHESIOLOGY

## 2021-11-24 PROCEDURE — 99214 OFFICE O/P EST MOD 30 MIN: CPT | Performed by: ANESTHESIOLOGY

## 2021-11-24 PROCEDURE — G8417 CALC BMI ABV UP PARAM F/U: HCPCS | Performed by: ANESTHESIOLOGY

## 2021-11-24 PROCEDURE — 99213 OFFICE O/P EST LOW 20 MIN: CPT | Performed by: ANESTHESIOLOGY

## 2021-11-24 PROCEDURE — G8428 CUR MEDS NOT DOCUMENT: HCPCS | Performed by: ANESTHESIOLOGY

## 2021-11-24 PROCEDURE — G8484 FLU IMMUNIZE NO ADMIN: HCPCS | Performed by: ANESTHESIOLOGY

## 2021-11-24 RX ORDER — OXYCODONE AND ACETAMINOPHEN 7.5; 325 MG/1; MG/1
1 TABLET ORAL 2 TIMES DAILY PRN
COMMUNITY
End: 2021-11-29 | Stop reason: SDUPTHER

## 2021-11-24 NOTE — PROGRESS NOTES
[address]  [facility], [address] [place] [address] Patrick  [phone]    Follow up Note      Dilip Andino     Date of Visit:  11/24/2021    CC:  Patient presents for follow up   Chief Complaint   Patient presents with    Follow-up    Back Pain     low back pain    Leg Pain     both legs, back and front       HPI:    Neck pain and left shoulder pain- S/P Left shoulder arthroscopy/ rotator cuff repair on 11/12/2021- has helped. Neck pain and shoulder pain has improved. Chronic low back pain for years- prior lumbar surgery in 2013 by Dr. Bobbi Conway. Continues to have low back and LE pain. Has been treated in the past at different pain centers. At some point she was on chronic pain medications. She did not like being on opioids and she discontinued going there.     NOTE:  H/o CRI- does not want NSAID's BUN- 13, creatinine-1.3 on 6/1/2021. Previous treatments:   Physical Therapy : yes, continues HEP     Medications: - NSAID's : H/o CRI- avoid NSAID's                       - Membrane stabilizers : yes -                       - Opioids : yes, in the past                        - Adjuvants or Others : yes,     Surgeries: yes, L5-S1 PLIF in 2013     She has not been on anticoagulation medications      She has not been on herbal supplements.       She is not diabetic.     H/O Smoking: yes  H/O alcohol abuse : no  H/O Illicit drug use : denies     Employment: stay at home       Imaging studies:  Xray Cervical spine: 10/20/2021:  FINDINGS:   All 7 cervical vertebrae are visualized and appear normal in height and   alignment.   There is no evidence of prevertebral soft tissue edema or   fracture.  The base of the odontoid appears intact.           Impression   No acute abnormality of the cervical spine.      Xray LS spine: 10/20/2021:  Impression   1. 1 cm anterolisthesis of L4 on L5 which is new when compared with the prior   CT scan of 04/14/2015. Robertha James is likely associated spinal canal stenosis   secondary to the listhesis. 2. Previous transpedicular fusion of L5 and S1.  There is no hardware   complication.           MRI of left shoulder: 10/29/2021:( has undergone surgery since this MRI)  Impression   1. Retracted full-thickness tearing of mid and posterior supraspinatus with   retraction of the torn fibers from the footplate measuring up to 1.9 cm.   2. Less than 50% multifocal partial-thickness articular-surface and   interstitial tearing of infraspinatus between musculotendinous junction and   footplate.  Moderate underlying infraspinatus tendinopathy. 3. Mild tendinosis of the intra-articular long head of the biceps tendon. 4. Mild diffuse labral degeneration.  Mild glenohumeral chondromalacia. Moderate glenohumeral joint effusion. 5. Mild degenerative change of the left AC joint.         CT Lumbar spine: 3/2014:  Impression-    1. Postsurgical changes. Status post laminectomy at L4-L5 and L5-S1. There is fusion of L5-S1.   2. Arthritic changes in the facet joint. 3. No abscess or hematoma in the surgical bed.        OARRS report[de-identified] Reviewed today    Past Medical History:   Diagnosis Date    Chronic back pain     Chronic kidney disease     stage 3  follows with nephrology    Low back pain        Past Surgical History:   Procedure Laterality Date    CARPAL TUNNEL RELEASE Bilateral     CHOLECYSTECTOMY      GALLBLADDER SURGERY      LUMBAR FUSION  2/1/2013    PLIF  L5 - S1    NOSE SURGERY      four nose surgeries   ( d/t a fractures)    PARATHYROIDECTOMY Right 02/09/2018    ROTATOR CUFF REPAIR Right     total of 2  surgeries    SHOULDER ARTHROSCOPY Right 02/21/2020    SHOULDER ARTHROSCOPY Right 2/21/2020    RIGHT SHOULDER ARTHROSCOPY.  SUBACROMIAL DECOMPRESSION, LABRAL AND BICEP  DEBRIDEMENT, ROTATOR CUFF REPAIR performed by Jeremy Ariza DO at [facility] ARTHROSCOPY Left 11/12/2021    LEFT SHOULDER ARTHROSCOPY, SUBACROMIAL DECOMPRESSION, ROTATOR CUFF REPAIR  (CPT [id number], [id number]) [place]) performed by La Jones, DO at [address] [place] [address] Osteopathy       Prior to Admission medications    Medication Sig Start Date End Date Taking? Authorizing Provider   oxyCODONE-acetaminophen (PERCOCET) 7.5-325 MG per tablet Take 1 tablet by mouth 2 times daily as needed for Pain (for post op pain prescribed by Dr Felicia Kent). Yes Historical Provider, MD   traZODone (DESYREL) 150 MG tablet Take one tab po nightly 11/3/21  Yes Ina Joy,    gabapentin (NEURONTIN) 600 MG tablet TAKE 1 TABLET BY MOUTH 2 TIMES DAILY AS NEEDED FOR PAIN  Patient taking differently: Take 600 mg by mouth daily. 11/3/21 5/9/22 Yes [address], DO   Naproxen Sodium (ALEVE) 220 MG CAPS Take by mouth 1 tablet PRN   Yes Historical Provider, MD   ibuprofen (ADVIL;MOTRIN) 200 MG tablet Take 200 mg by mouth every 6 hours as needed for Pain PRN   Yes Historical Provider, MD       Allergies   Allergen Reactions    Penicillin G Hives and Nausea And Vomiting       Social History     Socioeconomic History    Marital status:       Spouse name: Not on file    Number of children: Not on file    Years of education: Not on file    Highest education level: Not on file   Occupational History    Not on file   Tobacco Use    Smoking status: Current Every Day Smoker     Packs/day: 0.50     Years: 10.00     Pack years: 5.00     Types: Cigarettes     Start date: 11/24/2016    Smokeless tobacco: Never Used   Vaping Use    Vaping Use: Never used   Substance and Sexual Activity    Alcohol use: No    Drug use: No    Sexual activity: Not Currently   Other Topics Concern    Not on file   Social History Narrative    Not on file     Social Determinants of Health     Financial Resource Strain: Low Risk     Difficulty of Paying Living Expenses: Not hard at all   Food Insecurity: No Food Insecurity    Worried About [address] in the Last Year: Never true    [address] in the Last Year: Never true   Transportation Needs:     Lack of Transportation (Medical): Not on file    Lack of Transportation (Non-Medical): Not on file   Physical Activity:     Days of Exercise per Week: Not on file    Minutes of Exercise per Session: Not on file   Stress:     Feeling of Stress : Not on file   Social Connections:     Frequency of Communication with Friends and Family: Not on file    Frequency of Social Gatherings with Friends and Family: Not on file    Attends Mandaeism Services: Not on file    Active Member of Clubs or Organizations: Not on file    Attends Club or Organization Meetings: Not on file    Marital Status: Not on file   Intimate Partner Violence:     Fear of Current or Ex-Partner: Not on file    Emotionally Abused: Not on file    Physically Abused: Not on file    Sexually Abused: Not on file   Housing Stability:     Unable to Pay for Housing in the Last Year: Not on file    Number of [place] in the Last Year: Not on file    Unstable Housing in the Last Year: Not on file       Family History   Problem Relation Age of Onset    Heart Disease Mother     Arthritis Mother     Diabetes Mother     High Blood Pressure Mother     Kidney Disease Mother     Heart Disease Father     High Blood Pressure Father     Arthritis Father     Stroke Father     Diabetes Sister     Arthritis Maternal Uncle     Diabetes Maternal Uncle     Kidney Disease Maternal Uncle     Arthritis Maternal Grandmother        REVIEW OF SYSTEMS:     Carlos Michael denies fever/chills, chest pain, shortness of breath, new bowel or bladder complaints. All other review of systems was negative.     PHYSICAL EXAMINATION:      /70   Pulse 60   Temp 96.9 °F (36.1 °C) (Infrared)   Resp 16   Ht 5' (1.524 m)   Wt 152 lb (68.9 kg)   LMP 04/15/2015   BMI 29.69 kg/m²   General:       General appearance:  Pleasant and well-hydrated, in no distress and A & O x 3  Build:Overweight  Function: Rises from seated position easily and Moves about room without difficulty     HEENT:     Head:normocephalic, atraumatic     Lungs:     Breathing:normal breathing pattern      CVS:     RRR     Abdomen:     Shape:non-distended and normal     Cervical spine:     Inspection:normal  Palpation:tenderness paravertebral muscles, tenderness trapezium, left, right - no. Range of motion:some reduction for extension and rotation     Thoracic spine:                Spine inspection:normal      Lumbar spine:     Spine inspection: Scar from the prior surgery- healed well  Palpation: Tenderness paravertebral muscles Yes bilaterally  Range of motion: Decreased,  Sacroiliac joint tenderness Yes bilaterally  SLR : negative bilaterally     Musculoskeletal:     Trigger points no     Extremities:     Tremors:None bilaterally upper and lower  Edema:none x all 4 extremities     Left shoulder: In sling (post op)     Neurological:     Sensory: Normal to light touch      Motor:   No new focal deficits      Gait:normal Yes     Dermatology:     Skin:no rashes or lesions noted     Assessment/Plan:      Diagnosis Orders   1. DDD (degenerative disc disease), lumbar     2. Lumbar radiculopathy     3. Lumbosacral spondylosis without myelopathy     4. Postlaminectomy syndrome, lumbar     5. Sacroiliac dysfunction     6. Cervicalgia     7. Smoking          48 y.o.  female with H/o neck pain and left UE pain.     S/P left shoulder arthroscopy/rotator cuff surgery on 11/12/2021- recovering well. Follows with ortho - Dr. Arron Ferreira. Medrol dose pack and PT has helped the neck pain. Does not have neck pain currently. X-ray C spine- findings reviewed.     Physical therapy     TENS unit-trial.  If helps recommend long-term use      Chronic low back pain: Prior H/o L5-S1 PLIF in 2013 by Dr Angela Brownlee. Has low back / LE pain. Prior interventions and pain meds at different pain center. Does not like pain meds and has discontinued.     On Gabapentin 600 mg daily.     Recent  x- ray LS

## 2021-11-24 NOTE — PROGRESS NOTES
Do you currently have any of the following:    Fever: No  Headache:  No  Cough: No  Shortness of breath: No  Exposed to anyone with these symptoms: No                                                                                                                Adryan Davies presents to the [facility] on 11/24/2021. Zhang Greer is complaining of pain in her low back, down the back and front of both legs and left shoulder, she had surgery 11-12-21. . The pain is constant. The pain is described as constant colicky and always there and it gets worse at night. Pain is rated on her best day at a 7, on her worst day at a 10, and on average at a 8 on the VAS scale. She took her last dose of Percocet last night. Zhang Greer does not have issues with constipation. Any procedures since your last visit: Yes, 11-12-21 had rotator cuff, bone spurs removal on her left shoulder with Dr Kendall Sanchez. She is not on NSAIDS and  is not on anticoagulation medications to include none. Pacemaker or defibrillator: No.    Medication Contract and Consent for Opioid Use Documents Filed      No documents found                   /70   Pulse 60   Temp 96.9 °F (36.1 °C) (Infrared)   Resp 16   Ht 5' (1.524 m)   Wt 152 lb (68.9 kg)   LMP 04/15/2015   BMI 29.69 kg/m²      Patient's last menstrual period was 04/15/2015.

## 2021-11-29 ENCOUNTER — OFFICE VISIT (OUTPATIENT)
Dept: ORTHOPEDIC SURGERY | Age: 56
End: 2021-11-29

## 2021-11-29 VITALS — TEMPERATURE: 98 F | BODY MASS INDEX: 29.84 KG/M2 | WEIGHT: 152 LBS | HEIGHT: 60 IN

## 2021-11-29 DIAGNOSIS — M75.42 SHOULDER IMPINGEMENT SYNDROME, LEFT: ICD-10-CM

## 2021-11-29 DIAGNOSIS — S46.012A TRAUMATIC COMPLETE TEAR OF LEFT ROTATOR CUFF, INITIAL ENCOUNTER: Primary | ICD-10-CM

## 2021-11-29 DIAGNOSIS — M75.22 BICEPS TENDINITIS OF LEFT UPPER EXTREMITY: ICD-10-CM

## 2021-11-29 PROCEDURE — 99024 POSTOP FOLLOW-UP VISIT: CPT | Performed by: ORTHOPAEDIC SURGERY

## 2021-11-29 RX ORDER — OXYCODONE AND ACETAMINOPHEN 7.5; 325 MG/1; MG/1
1 TABLET ORAL EVERY 6 HOURS PRN
Qty: 28 TABLET | Refills: 0 | Status: SHIPPED
Start: 2021-11-29 | End: 2022-01-05 | Stop reason: SDUPTHER

## 2021-11-29 NOTE — PROGRESS NOTES
Rudolph Venegas is here for follow-up after left shoulder arthroscopy. Findings at surgery: rtc tear,impingement. Pain is controlled without any medications. The patient denies fever, wound drainage, increasing redness, pus, increasing pain, increasing swelling. Post op problems reported: none. She is ambulating sling. Physical exam:  The wounds are clean, dry, no drainage. She has intact motor and sensory functions, grossly intact in the median, ulnar, radial, musculocutaneous, and axillary nerve distributions. She   has bounding pulses in the wrist.  Capillary refill is less than 2 seconds in all digits. Surgical pictures from the surgery were reveiwed with the patient     Impression:   Encounter Diagnoses   Name Primary?  Traumatic complete tear of left rotator cuff, initial encounter Yes    Shoulder impingement syndrome, left     Biceps tendinitis of left upper extremity          Plan:    I will remove the surgical sutures. The patient will now D/C the abduction pillow. She  will begin range of motion at the elbow, wrist and hand. She will continue to use analgesics to control the pain and will continue with sling immobilization. I will see them back in 4 weeks for repeat evaluation.

## 2021-12-21 ENCOUNTER — TELEPHONE (OUTPATIENT)
Dept: PAIN MANAGEMENT | Age: 56
End: 2021-12-21

## 2021-12-21 DIAGNOSIS — M53.3 SACROILIAC DYSFUNCTION: Primary | ICD-10-CM

## 2021-12-21 NOTE — TELEPHONE ENCOUNTER
Abdiel Arellano from [facility] scheduling called. [phone]    Patient is scheduled for an MRI of the lumbar spine on 1/10 at [facility]. She needs a BUN/C order put in the computer so she can have this done in [place].

## 2022-01-05 ENCOUNTER — OFFICE VISIT (OUTPATIENT)
Dept: ORTHOPEDIC SURGERY | Age: 57
End: 2022-01-05

## 2022-01-05 VITALS — TEMPERATURE: 98 F | BODY MASS INDEX: 29.84 KG/M2 | HEIGHT: 60 IN | WEIGHT: 152 LBS

## 2022-01-05 DIAGNOSIS — S46.012A TRAUMATIC COMPLETE TEAR OF LEFT ROTATOR CUFF, INITIAL ENCOUNTER: Primary | ICD-10-CM

## 2022-01-05 DIAGNOSIS — M75.22 BICEPS TENDINITIS OF LEFT UPPER EXTREMITY: ICD-10-CM

## 2022-01-05 DIAGNOSIS — M75.42 SHOULDER IMPINGEMENT SYNDROME, LEFT: ICD-10-CM

## 2022-01-05 DIAGNOSIS — M53.3 SACROILIAC DYSFUNCTION: ICD-10-CM

## 2022-01-05 LAB
BUN BLDV-MCNC: 15 MG/DL (ref 6–20)
CREAT SERPL-MCNC: 1.3 MG/DL (ref 0.5–1)
GFR AFRICAN AMERICAN: 51
GFR NON-AFRICAN AMERICAN: 43 ML/MIN/1.73

## 2022-01-05 PROCEDURE — 99024 POSTOP FOLLOW-UP VISIT: CPT | Performed by: NURSE PRACTITIONER

## 2022-01-05 RX ORDER — OXYCODONE AND ACETAMINOPHEN 7.5; 325 MG/1; MG/1
1 TABLET ORAL EVERY 6 HOURS PRN
Qty: 28 TABLET | Refills: 0 | Status: SHIPPED | OUTPATIENT
Start: 2022-01-05 | End: 2022-01-12

## 2022-01-05 NOTE — PROGRESS NOTES
Geovanny Rowan is here for post op visit after RTC repair and shoulder arthroscopy. The patient is post op week 8. The patient is  doing well. Physical exam:  The wounds are clean, dry, no drainage. Range of motion: diminished range of motion. She has intact motor and sensory functions, grossly intact in the median, ulnar, radial, musculocutaneous, and axillary nerve distributions. She has bounding pulses in the wrist.  Capillary refill is less than 2 seconds in all digits. Impression:  {  Encounter Diagnoses   Name Primary?  Traumatic complete tear of left rotator cuff, initial encounter Yes    Shoulder impingement syndrome, left     Biceps tendinitis of left upper extremity        Plan:    The patient will now D/C the sling. She will continue range of motion at the elbow, wrist and hand and initiate physical therapy. She will continue to use analgesics to control the pain.       I will see them back in 2 months

## 2022-01-10 ENCOUNTER — HOSPITAL ENCOUNTER (OUTPATIENT)
Dept: MRI IMAGING | Age: 57
Discharge: HOME | End: 2022-01-12
Payer: COMMERCIAL

## 2022-01-10 ENCOUNTER — HOSPITAL ENCOUNTER (OUTPATIENT)
Dept: GENERAL RADIOLOGY | Age: 57
Discharge: HOME | End: 2022-01-12
Payer: COMMERCIAL

## 2022-01-10 ENCOUNTER — HOSPITAL ENCOUNTER (OUTPATIENT)
Age: 57
Discharge: HOME | End: 2022-01-12
Payer: COMMERCIAL

## 2022-01-10 DIAGNOSIS — M96.1 POSTLAMINECTOMY SYNDROME, LUMBAR: ICD-10-CM

## 2022-01-10 DIAGNOSIS — M54.16 LUMBAR RADICULOPATHY: ICD-10-CM

## 2022-01-10 DIAGNOSIS — M51.36 DDD (DEGENERATIVE DISC DISEASE), LUMBAR: ICD-10-CM

## 2022-01-10 PROCEDURE — 72120 X-RAY BEND ONLY L-S SPINE: CPT

## 2022-01-10 PROCEDURE — A9577 INJ MULTIHANCE: HCPCS | Performed by: RADIOLOGY

## 2022-01-10 PROCEDURE — 72158 MRI LUMBAR SPINE W/O & W/DYE: CPT

## 2022-01-10 PROCEDURE — 6360000004 HC RX CONTRAST MEDICATION: Performed by: RADIOLOGY

## 2022-01-10 RX ADMIN — GADOBENATE DIMEGLUMINE 14 ML: 529 INJECTION, SOLUTION INTRAVENOUS at 18:33

## 2022-01-21 ENCOUNTER — OFFICE VISIT (OUTPATIENT)
Dept: PAIN MANAGEMENT | Age: 57
End: 2022-01-21
Payer: COMMERCIAL

## 2022-01-21 ENCOUNTER — PREP FOR PROCEDURE (OUTPATIENT)
Dept: PAIN MANAGEMENT | Age: 57
End: 2022-01-21

## 2022-01-21 ENCOUNTER — TELEPHONE (OUTPATIENT)
Dept: PAIN MANAGEMENT | Age: 57
End: 2022-01-21

## 2022-01-21 VITALS
DIASTOLIC BLOOD PRESSURE: 68 MMHG | TEMPERATURE: 95.7 F | BODY MASS INDEX: 29.84 KG/M2 | HEIGHT: 60 IN | WEIGHT: 152 LBS | RESPIRATION RATE: 16 BRPM | HEART RATE: 81 BPM | SYSTOLIC BLOOD PRESSURE: 100 MMHG | OXYGEN SATURATION: 97 %

## 2022-01-21 DIAGNOSIS — M48.061 SPINAL STENOSIS OF LUMBAR REGION, UNSPECIFIED WHETHER NEUROGENIC CLAUDICATION PRESENT: ICD-10-CM

## 2022-01-21 DIAGNOSIS — M96.1 POSTLAMINECTOMY SYNDROME, LUMBAR: ICD-10-CM

## 2022-01-21 DIAGNOSIS — M51.36 DDD (DEGENERATIVE DISC DISEASE), LUMBAR: Primary | ICD-10-CM

## 2022-01-21 DIAGNOSIS — M51.36 DDD (DEGENERATIVE DISC DISEASE), LUMBAR: ICD-10-CM

## 2022-01-21 DIAGNOSIS — M54.16 LUMBAR RADICULOPATHY: ICD-10-CM

## 2022-01-21 DIAGNOSIS — M54.16 LUMBAR RADICULOPATHY: Primary | ICD-10-CM

## 2022-01-21 PROCEDURE — 99213 OFFICE O/P EST LOW 20 MIN: CPT | Performed by: ANESTHESIOLOGY

## 2022-01-21 PROCEDURE — 4004F PT TOBACCO SCREEN RCVD TLK: CPT | Performed by: ANESTHESIOLOGY

## 2022-01-21 PROCEDURE — 3017F COLORECTAL CA SCREEN DOC REV: CPT | Performed by: ANESTHESIOLOGY

## 2022-01-21 PROCEDURE — G8484 FLU IMMUNIZE NO ADMIN: HCPCS | Performed by: ANESTHESIOLOGY

## 2022-01-21 PROCEDURE — 99214 OFFICE O/P EST MOD 30 MIN: CPT | Performed by: ANESTHESIOLOGY

## 2022-01-21 PROCEDURE — G8417 CALC BMI ABV UP PARAM F/U: HCPCS | Performed by: ANESTHESIOLOGY

## 2022-01-21 PROCEDURE — G8427 DOCREV CUR MEDS BY ELIG CLIN: HCPCS | Performed by: ANESTHESIOLOGY

## 2022-01-21 NOTE — PROGRESS NOTES
[address] [facility], [address] [place] [address] Patrick  [phone]    Follow up Note      Skyla Castillo     Date of Visit:  1/21/2022    CC:  Patient presents for follow up   Chief Complaint   Patient presents with    Results     MRI and xrays       HPI:    Neck pain and left shoulder pain- S/P Left shoulder arthroscopy/ rotator cuff repair on 11/12/2021- has helped. Neck pain and shoulder pain has improved. Chronic low back pain for years- prior lumbar surgery in 2013 by Dr. Jania Baires. Continues to have low back and LE pain. Has been treated in the past at different pain centers. At some point she was on chronic pain medications. She did not like being on opioids and she discontinued going there. She has undergone imaging of the lumbar spine including MRI of the lumbar spine and flexion-extension x-rays. He has reviewed the results and treatment plan.     NOTE:  H/o CRI- does not want NSAID's BUN- 13, creatinine-1.3 on 6/1/2021.     Previous treatments:   Physical Therapy : yes, continues HEP     Medications: - NSAID's : H/o CRI- avoid NSAID's                       - Membrane stabilizers : yes -                       - Opioids : yes, in the past                        - Adjuvants or Others : yes,     Surgeries: yes, L5-S1 PLIF in 2013     She has not been on anticoagulation medications      She has not been on herbal supplements.       She is not diabetic.     H/O Smoking: yes  H/O alcohol abuse : no  H/O Illicit drug use : denies     Employment: stay at home     Imaging studies:    MRI of LS spine: 1/10/2022:  FINDINGS:   BONES/ALIGNMENT: Laminectomies from L4 to S1.  L5-S1 posterior and interbody   fusion.  Grade 1 anterolisthesis of L4 on L5.  Mild retrolisthesis of L3 on   L4.  Vertebral heights are normal.  Bone marrow signal is normal.       SPINAL CORD:  The conus terminates normally.       SOFT TISSUES: No abnormal enhancement is seen of the lumbar spine.  No paraspinal mass identified.       L1-L2: There is no significant disc protrusion, spinal canal stenosis or   neural foraminal narrowing.       L2-L3: There is no significant disc protrusion, spinal canal stenosis or   neural foraminal narrowing.       L3-L4: Disc bulge, facet and ligament flavum hypertrophy cause mild thecal   sac, moderate right foraminal and severe left foraminal stenosis.       L4-L5: Anterolisthesis, disc bulge, facet and ligament flavum hypertrophy   cause mild thecal sac and moderate bilateral foraminal stenosis.       L5-S1: No stenosis.         Impression   Bilateral foraminal stenosis from L3 to L5. Xray LS flex/ Ext: 1/10/2022:      Impression   No instability evident.  Intact lumbosacral fusion hardware.  Unchanged grade   2 anterolisthesis of L4 on L5.  See above. Xray Cervical spine: 10/20/2021:  FINDINGS:   All 7 cervical vertebrae are visualized and appear normal in height and   alignment.   There is no evidence of prevertebral soft tissue edema or   fracture.  The base of the odontoid appears intact.           Impression   No acute abnormality of the cervical spine. Xray LS spine: 10/20/2021:  Impression   1. 1 cm anterolisthesis of L4 on L5 which is new when compared with the prior   CT scan of 04/14/2015. Ahmed Ship is likely associated spinal canal stenosis   secondary to the listhesis. 2. Previous transpedicular fusion of L5 and S1.  There is no hardware   complication.           MRI of left shoulder: 10/29/2021:( has undergone surgery since this MRI)  Impression   1. Retracted full-thickness tearing of mid and posterior supraspinatus with   retraction of the torn fibers from the footplate measuring up to 1.9 cm.   2. Less than 50% multifocal partial-thickness articular-surface and   interstitial tearing of infraspinatus between musculotendinous junction and   footplate.  Moderate underlying infraspinatus tendinopathy.    3. Mild tendinosis of the intra-articular long head of the biceps tendon. 4. Mild diffuse labral degeneration.  Mild glenohumeral chondromalacia. Moderate glenohumeral joint effusion. 5. Mild degenerative change of the left AC joint.         CT Lumbar spine: 3/2014:  Impression-    1. Postsurgical changes. Status post laminectomy at L4-L5 and L5-S1. There is fusion of L5-S1.   2. Arthritic changes in the facet joint. 3. No abscess or hematoma in the surgical bed.        OARRS report[de-identified] Reviewed today    Past Medical History:   Diagnosis Date    Chronic back pain     Chronic kidney disease     stage 3  follows with nephrology    Low back pain        Past Surgical History:   Procedure Laterality Date    CARPAL TUNNEL RELEASE Bilateral     CHOLECYSTECTOMY      GALLBLADDER SURGERY      LUMBAR FUSION  2/1/2013    PLIF  L5 - S1    NOSE SURGERY      four nose surgeries   ( d/t a fractures)    PARATHYROIDECTOMY Right 02/09/2018    ROTATOR CUFF REPAIR Right     total of 2  surgeries    SHOULDER ARTHROSCOPY Right 02/21/2020    SHOULDER ARTHROSCOPY Right 2/21/2020    RIGHT SHOULDER ARTHROSCOPY. SUBACROMIAL DECOMPRESSION, LABRAL AND BICEP  DEBRIDEMENT, ROTATOR CUFF REPAIR performed by Anton Guzman DO at [address] [facility] ARTHROSCOPY Left 11/12/2021    LEFT SHOULDER ARTHROSCOPY, SUBACROMIAL DECOMPRESSION,  ROTATOR CUFF REPAIR  (CPT 08316, [id number]) Jeri Joy) performed by Anton Guzman DO at [facility]       Prior to Admission medications    Medication Sig Start Date End Date Taking? Authorizing Provider   traZODone (DESYREL) 150 MG tablet Take one tab po nightly 11/3/21  Yes Ina Joy,    gabapentin (NEURONTIN) 600 MG tablet TAKE 1 TABLET BY MOUTH 2 TIMES DAILY AS NEEDED FOR PAIN  Patient taking differently: Take 600 mg by mouth daily.   11/3/21 5/9/22 Yes Alejandro Merchant, DO   Naproxen Sodium (ALEVE) 220 MG CAPS Take by mouth 1 tablet PRN   Yes Historical Provider, MD   ibuprofen (ADVIL;MOTRIN) 200 MG tablet Take 200 mg by mouth every 6 hours as needed for Pain PRN   Yes Historical Provider, MD       Allergies   Allergen Reactions    Penicillin G Hives and Nausea And Vomiting       Social History     Socioeconomic History    Marital status:      Spouse name: Not on file    Number of children: Not on file    Years of education: Not on file    Highest education level: Not on file   Occupational History    Not on file   Tobacco Use    Smoking status: Current Every Day Smoker     Packs/day: 0.50     Years: 10.00     Pack years: 5.00     Types: Cigarettes     Start date: 11/24/2016    Smokeless tobacco: Never Used   Vaping Use    Vaping Use: Never used   Substance and Sexual Activity    Alcohol use: No    Drug use: No    Sexual activity: Not Currently   Other Topics Concern    Not on file   Social History Narrative    Not on file     Social Determinants of Health     Financial Resource Strain: Low Risk     Difficulty of Paying Living Expenses: Not hard at all   Food Insecurity: No Food Insecurity    Worried About [employer] [employer] in the Last Year: Never true    [address] in the Last Year: Never true   Transportation Needs:     Lack of Transportation (Medical): Not on file    Lack of Transportation (Non-Medical):  Not on file   Physical Activity:     Days of Exercise per Week: Not on file    Minutes of Exercise per Session: Not on file   Stress:     Feeling of Stress : Not on file   Social Connections:     Frequency of Communication with Friends and Family: Not on file    Frequency of Social Gatherings with Friends and Family: Not on file    Attends Hoahaoism Services: Not on file    Active Member of Clubs or Organizations: Not on file    Attends Club or Organization Meetings: Not on file    Marital Status: Not on file   Intimate Partner Violence:     Fear of Current or Ex-Partner: Not on file    Emotionally Abused: Not on file    Physically Abused: Not on file    Sexually Abused: Not on file   Housing Stability:     Unable to Pay for Housing in the Last Year: Not on file    Number of Places Lived in the Last Year: Not on file    Unstable Housing in the Last Year: Not on file       Family History   Problem Relation Age of Onset    Heart Disease Mother     Arthritis Mother     Diabetes Mother     High Blood Pressure Mother     Kidney Disease Mother     Heart Disease Father     High Blood Pressure Father     Arthritis Father     Stroke Father     Diabetes Sister     Arthritis Maternal Uncle     Diabetes Maternal Uncle     Kidney Disease Maternal Uncle     Arthritis Maternal Grandmother        REVIEW OF SYSTEMS:     Eduardo Cain denies fever/chills, chest pain, shortness of breath, new bowel or bladder complaints. All other review of systems was negative. PHYSICAL EXAMINATION:      /68   Pulse 81   Temp 95.7 °F (35.4 °C) (Infrared)   Resp 16   Ht 5' (1.524 m)   Wt 152 lb (68.9 kg)   LMP 04/15/2015   SpO2 97%   BMI 29.69 kg/m²   General:       General appearance:  Pleasant and well-hydrated, in no distress and A & O x 3  Build:Overweight  Function: Rises from seated position easily and Moves about room without difficulty     HEENT:     Head:normocephalic, atraumatic     Lungs:     Breathing:normal breathing pattern      CVS:     RRR     Abdomen:     Shape:non-distended and normal     Cervical spine:     Inspection:normal  Palpation:tenderness paravertebral muscles, tenderness trapezium, left, right - no.   Range of motion:some reduction for extension and rotation     Thoracic spine:                Spine inspection:normal      Lumbar spine:     Spine inspection: Scar from the prior surgery- healed well  Palpation: Tenderness paravertebral muscles Yes bilaterally  Range of motion: Decreased,  Sacroiliac joint tenderness Yes bilaterally  SLR : negative bilaterally     Musculoskeletal:     Trigger points no     Extremities:     Tremors:None bilaterally upper and lower  Edema:none x all 4 extremities     Neurological:     Sensory: Normal to light touch      Motor:   No new focal deficits      Gait:normal Yes     Dermatology:     Skin:no rashes or lesions noted     Assessment/Plan:      Diagnosis Orders   1. DDD (degenerative disc disease), lumbar     2. Spinal stenosis of lumbar region, unspecified whether neurogenic claudication present     3. Lumbar radiculopathy     4. Postlaminectomy syndrome, lumbar          55 y.o.  female with H/o neck pain and left UE pain and low back / LE pain. Neck pain improved after oral steroids. Physical therapy / HEP.     Chronic low back pain: Prior H/o L5-S1 PLIF in 2013 by Dr Scherrie Osler. Has low back / LE pain. Prior interventions and pain meds at different pain center. Does not like pain meds and has discontinued. Failed conservative treatment. On Gabapentin 600 mg  Bid- helps moderately.     X-ray Flex / Ext LS spine - no instability. MRI of LS spine done on 1/10/2022 reviewed personally and discussed. Plan:  Lumbar TFESI left L3 and right L4 under fluoroscopy. RBA discussed. Patient agreed.     If no relief, recommend NSG eval.    May be a candidate for SCS trial for low back pain in future If not a surgical candidate.     Continues gabapentin to 600 mg bid.     H/o Smoking +     Counseling : Patient encouraged to stay active, watch weight and to continue Regular home exercise program as tolerated - stretching / strengthening.     Smoking cessation counseling : yes -stressed importance of smoking cessation on spine health and general health.     Treatment plan discussed with the patient including medication and procedure side effects.     Controlled Substances Monitoring:   OARRS reviewed     Shira Paul MD    CC:  Rekha Singh DO

## 2022-01-21 NOTE — PROGRESS NOTES
Do you currently have any of the following:    Fever: No  Headache:  No  Cough: No  Shortness of breath: No  Exposed to anyone with these symptoms: Lilli Atkinson presents to the [facility] on 1/21/2022. Brooks Trevizo is complaining of pain in lower back and bilateral legs. . The pain is constant. The pain is described as a lot of pressure. . Pain is rated on her best day at a 7, on her worst day at a 10, and on average at a 7 on the VAS scale. She took her last dose of Neurontin, Motrin and naproxen . Brooks Trevizo does not have issues with constipation. Any procedures since your last visit: No,       She is  on NSAIDS and  is not on anticoagulation medications to include none and is managed by Mirna Chew DO  . Pacemaker or defibrillator: No Physician managing device is NA. Medication Contract and Consent for Opioid Use Documents Filed      No documents found                   /68   Pulse 81   Temp 95.7 °F (35.4 °C) (Infrared)   Resp 16   Ht 5' (1.524 m)   Wt 152 lb (68.9 kg)   LMP 04/15/2015   SpO2 97%   BMI 29.69 kg/m²      Patient's last menstrual period was 04/15/2015.

## 2022-02-01 ENCOUNTER — HOSPITAL ENCOUNTER (OUTPATIENT)
Dept: OPERATING ROOM | Age: 57
Setting detail: OUTPATIENT SURGERY
Discharge: HOME | End: 2022-02-01
Attending: ANESTHESIOLOGY
Payer: COMMERCIAL

## 2022-02-01 ENCOUNTER — HOSPITAL ENCOUNTER (OUTPATIENT)
Age: 57
Setting detail: OUTPATIENT SURGERY
Discharge: HOME | End: 2022-02-01
Attending: ANESTHESIOLOGY | Admitting: ANESTHESIOLOGY
Payer: COMMERCIAL

## 2022-02-01 VITALS
DIASTOLIC BLOOD PRESSURE: 80 MMHG | HEART RATE: 61 BPM | TEMPERATURE: 96.6 F | WEIGHT: 152 LBS | RESPIRATION RATE: 16 BRPM | HEIGHT: 60 IN | OXYGEN SATURATION: 100 % | SYSTOLIC BLOOD PRESSURE: 116 MMHG | BODY MASS INDEX: 29.84 KG/M2

## 2022-02-01 DIAGNOSIS — M48.061 NEUROFORAMINAL STENOSIS OF LUMBAR SPINE: ICD-10-CM

## 2022-02-01 PROCEDURE — 7100000011 HC PHASE II RECOVERY - ADDTL 15 MIN: Performed by: ANESTHESIOLOGY

## 2022-02-01 PROCEDURE — 3209999900 FLUORO FOR SURGICAL PROCEDURES

## 2022-02-01 PROCEDURE — 3600000005 HC SURGERY LEVEL 5 BASE: Performed by: ANESTHESIOLOGY

## 2022-02-01 PROCEDURE — 6360000004 HC RX CONTRAST MEDICATION: Performed by: ANESTHESIOLOGY

## 2022-02-01 PROCEDURE — 64484 NJX AA&/STRD TFRM EPI L/S EA: CPT | Performed by: ANESTHESIOLOGY

## 2022-02-01 PROCEDURE — 6360000002 HC RX W HCPCS: Performed by: ANESTHESIOLOGY

## 2022-02-01 PROCEDURE — 2709999900 HC NON-CHARGEABLE SUPPLY: Performed by: ANESTHESIOLOGY

## 2022-02-01 PROCEDURE — 64483 NJX AA&/STRD TFRM EPI L/S 1: CPT | Performed by: ANESTHESIOLOGY

## 2022-02-01 PROCEDURE — 3600000015 HC SURGERY LEVEL 5 ADDTL 15MIN: Performed by: ANESTHESIOLOGY

## 2022-02-01 PROCEDURE — 7100000010 HC PHASE II RECOVERY - FIRST 15 MIN: Performed by: ANESTHESIOLOGY

## 2022-02-01 PROCEDURE — 2500000003 HC RX 250 WO HCPCS: Performed by: ANESTHESIOLOGY

## 2022-02-01 RX ORDER — METHYLPREDNISOLONE ACETATE 40 MG/ML
INJECTION, SUSPENSION INTRA-ARTICULAR; INTRALESIONAL; INTRAMUSCULAR; SOFT TISSUE PRN
Status: DISCONTINUED | OUTPATIENT
Start: 2022-02-01 | End: 2022-02-01 | Stop reason: ALTCHOICE

## 2022-02-01 RX ORDER — LIDOCAINE HYDROCHLORIDE 5 MG/ML
INJECTION, SOLUTION INFILTRATION; INTRAVENOUS PRN
Status: DISCONTINUED | OUTPATIENT
Start: 2022-02-01 | End: 2022-02-01 | Stop reason: ALTCHOICE

## 2022-02-01 ASSESSMENT — PAIN - FUNCTIONAL ASSESSMENT: PAIN_FUNCTIONAL_ASSESSMENT: 0-10

## 2022-02-01 ASSESSMENT — PAIN SCALES - GENERAL: PAINLEVEL_OUTOF10: 0

## 2022-02-01 NOTE — H&P
[address] [facility], [address] [place] [address] Patrick  [phone]    Procedure History & Physical      [facility]     HPI:    Patient  is here for Lumbar TFESI  for low back./ LE pain  Labs/imaging studies reviewed   All question and concerns addressed including R/B/A associated with the procedure    Past Medical History:   Diagnosis Date    Chronic back pain     Chronic kidney disease     stage 3  follows with nephrology    Low back pain        Past Surgical History:   Procedure Laterality Date    CARPAL TUNNEL RELEASE Bilateral     CHOLECYSTECTOMY      GALLBLADDER SURGERY      LUMBAR FUSION  2/1/2013    PLIF  L5 - S1    NOSE SURGERY      four nose surgeries   ( d/t a fractures)    PARATHYROIDECTOMY Right 02/09/2018    ROTATOR CUFF REPAIR Right     total of 2  surgeries    SHOULDER ARTHROSCOPY Right 02/21/2020    SHOULDER ARTHROSCOPY Right 2/21/2020    RIGHT SHOULDER ARTHROSCOPY. SUBACROMIAL DECOMPRESSION, LABRAL AND BICEP  DEBRIDEMENT, ROTATOR CUFF REPAIR performed by Irma Bailey DO at [address] [facility] ARTHROSCOPY Left 11/12/2021    LEFT SHOULDER ARTHROSCOPY, SUBACROMIAL DECOMPRESSION,  ROTATOR CUFF REPAIR  (CPT 11138, [id number]) Emily Bellis) performed by Irma Bailey DO at [facility]       Prior to Admission medications    Medication Sig Start Date End Date Taking? Authorizing Provider   ibuprofen (ADVIL;MOTRIN) 200 MG tablet Take 200 mg by mouth every 6 hours as needed for Pain PRN   Yes Historical Provider, MD   traZODone (DESYREL) 150 MG tablet Take one tab po nightly 11/3/21   Ofelia Joy DO   gabapentin (NEURONTIN) 600 MG tablet TAKE 1 TABLET BY MOUTH 2 TIMES DAILY AS NEEDED FOR PAIN  Patient taking differently: Take 600 mg by mouth daily.   11/3/21 5/9/22  Ofelia Joy DO   Naproxen Sodium (ALEVE) 220 MG CAPS Take by mouth 1 tablet PRN    Historical Provider, MD       Allergies   Allergen Reactions    Penicillin G Hives and Nausea And Vomiting       Social History     Socioeconomic History    Marital status:      Spouse name: Not on file    Number of children: Not on file    Years of education: Not on file    Highest education level: Not on file   Occupational History    Not on file   Tobacco Use    Smoking status: Current Every Day Smoker     Packs/day: 0.50     Years: 10.00     Pack years: 5.00     Types: Cigarettes     Start date: 11/24/2016    Smokeless tobacco: Never Used   Vaping Use    Vaping Use: Never used   Substance and Sexual Activity    Alcohol use: No    Drug use: No    Sexual activity: Not Currently   Other Topics Concern    Not on file   Social History Narrative    Not on file     Social Determinants of Health     Financial Resource Strain: Low Risk     Difficulty of Paying Living Expenses: Not hard at all   Food Insecurity: No Food Insecurity    Worried About [address] [employer] in the Last Year: Never true    [facility] [employer] in the Last Year: Never true   Transportation Needs:     Lack of Transportation (Medical): Not on file    Lack of Transportation (Non-Medical):  Not on file   Physical Activity:     Days of Exercise per Week: Not on file    Minutes of Exercise per Session: Not on file   Stress:     Feeling of Stress : Not on file   Social Connections:     Frequency of Communication with Friends and Family: Not on file    Frequency of Social Gatherings with Friends and Family: Not on file    Attends Shinto Services: Not on file    Active Member of Clubs or Organizations: Not on file    Attends Club or Organization Meetings: Not on file    Marital Status: Not on file   Intimate Partner Violence:     Fear of Current or Ex-Partner: Not on file    Emotionally Abused: Not on file    Physically Abused: Not on file    Sexually Abused: Not on file   Housing Stability:     Unable to Pay for Housing in the Last Year: Not on file    Number of [place] in the Last Year: Not on file    Unstable Housing in the Last Year: Not on file       Family History   Problem Relation Age of Onset    Heart Disease Mother     Arthritis Mother     Diabetes Mother     High Blood Pressure Mother     Kidney Disease Mother     Heart Disease Father     High Blood Pressure Father     Arthritis Father     Stroke Father     Diabetes Sister     Arthritis Maternal Uncle     Diabetes Maternal Uncle     Kidney Disease Maternal Uncle     Arthritis Maternal Grandmother          REVIEW OF SYSTEMS:    CONSTITUTIONAL:  negative for  fevers, chills, sweats and fatigue    RESPIRATORY:  negative for  dry cough, cough with sputum, dyspnea, wheezing and chest pain    CARDIOVASCULAR:  negative for chest pain, dyspnea, palpitations, syncope    GASTROINTESTINAL:  negative for nausea, vomiting, change in bowel habits, diarrhea, constipation and abdominal pain    MUSCULOSKELETAL: negative for muscle weakness    SKIN: negative for itching or rashes. BEHAVIOR/PSYCH:  negative for poor appetite, increased appetite, decreased sleep and poor concentration    All other systems negative      PHYSICAL EXAM:    VITALS:  /80   Pulse 72   Temp 96.6 °F (35.9 °C)   Resp 20   Ht 5' (1.524 m)   Wt 152 lb (68.9 kg)   LMP 04/15/2015   SpO2 97%   BMI 29.69 kg/m²     CONSTITUTIONAL:  awake, alert, cooperative, no apparent distress, and appears stated age    EYES: PERRLA, EOMI    LUNGS:  No increased work of breathing, no audible wheezing    CARDIOVASCULAR:  regular rate and rhythm    ABDOMEN:  Soft non tender non distended     EXTREMITIES: no signs of clubbing or cyanosis. MUSCULOSKELETAL: negative for flaccid muscle tone or spastic movements. SKIN: gross examination reveals no signs of rashes, or diaphoresis. NEURO: Cranial nerves II-XII grossly intact. No signs of agitated mood.        Assessment/Plan:    Patient  is here for Lumbar TFESI  for low back./ LE pain    The patient was counseled at length about the risks of shruti Covid-19 during their perioperative period and any recovery window from their procedure. The patient was made aware that shruti Covid-19  may worsen their prognosis for recovering from their procedure  and lend to a higher morbidity and/or mortality risk. All material risks, benefits, and reasonable alternatives including postponing the procedure were discussed. The patient does wish to proceed with the procedure at this time.       Kiki Milligan MD

## 2022-02-01 NOTE — OP NOTE
Operative Note      Patient: Zain Abreu  YOB: 1965  MRN: [id number]    Date of Procedure: 2022    Pre-Op Diagnosis: LUMBAR RADICULOPATHY, lumbar DDD    Post-Op Diagnosis: Same       Procedure(s):  LUMBAR TRANSFORAMINAL EPIDURAL STEROID INJECTION LEFT L3 & RIGHT L4 UNDER XRAY GUIDANCE. Surgeon(s):  Breonna Summers MD    Assistant:   * No surgical staff found *    Anesthesia: Local    Estimated Blood Loss (mL): Minimal    Complications: None    Specimens:   * No specimens in log *    Implants:  * No implants in log *      Drains: * No LDAs found *    Findings: good needle placement    Detailed Description of Procedure:   2022    Patient: Zain Abreu  :  1965  Age:  55 y.o. Sex:  female     PRE-OPERATIVE DIAGNOSIS: Lumbar disc displacement, lumbar neural foraminal stenosis, lumbar radiculopathy. POST-OPERATIVE DIAGNOSIS: Same. PROCEDURE: Right L4 and left L3 Transforaminal epidural steroid injection under fluoroscopic guidance (#1). SURGEON: Breonna Summers MD    ANESTHESIA: local    ESTIMATED BLOOD LOSS: None.  ______________________________________________________________________  BRIEF HISTORY: Zain Abreu comes in today for the first lumbar transforaminal epidural steroid injection under fluoroscopic guidance. The potential complications of this procedure were discussed with her again today. She has elected to undergo the aforementioned procedure. Leenas complete History & Physical examination were reviewed in depth, a copy of which is in the chart. DESCRIPTION OF PROCEDURE:    After confirming written and informed consent, a time-out was performed and Yasemin name and date of birth, the procedure to be performed as well as the plan for the location of the needle insertion were confirmed. The patient was brought into the procedure room and placed in the prone position on the fluoroscopy table.  Standard monitors were placed and vital signs were observed throughout the procedure. The area of the lumbar spine was prepped with chloraprep and draped in a sterile manner. The vertebral body was identified with AP fluoroscopy. An oblique view was obtained to better visualize the inferior junction of the pedicle and transverse process. The 6 o'clock position of the pedicle was marked and identified. The skin and subcutaneous tissue were anesthetized with 0.5% lidocaine. TWO # 22 gauge 5 inch pencil point needles was directed toward the targeted point under fluoroscopy until bone was contacted. The needle was then walked inferiorly until the neural foramen was entered. A lateral fluoroscopic view was then used to place the needle tip in the middle of the foramen. Negative aspiration was confirmed for blood and CSF and 0.5-1 cc of Omnipaque 300 contrast was injected at each level under live fluoroscopy. Appropriate neurograms were observed under AP fluoroscopy. Then after negative aspiration, a solution of the 3 cc of 0.5% lidocaine and 30 mg DepoMedrol was easily injected at each level. The needles were removed with constant aspiration technique. The patient back was cleaned and a bandage was placed over the needle insertion points    Disposition the patient tolerated the procedure well and there were no complications . Vital signs remained stable throughout the procedure. The patient was escorted to the recovery area where they remained until discharge and written discharge instructions for the procedure were given. Plan: Abhay Esposito will return to our pain management center as scheduled.      Antonio Rodriguez MD

## 2022-02-22 ENCOUNTER — OFFICE VISIT (OUTPATIENT)
Dept: PAIN MANAGEMENT | Age: 57
End: 2022-02-22
Payer: COMMERCIAL

## 2022-02-22 VITALS
HEIGHT: 60 IN | BODY MASS INDEX: 29.84 KG/M2 | DIASTOLIC BLOOD PRESSURE: 86 MMHG | WEIGHT: 152 LBS | RESPIRATION RATE: 16 BRPM | SYSTOLIC BLOOD PRESSURE: 106 MMHG | TEMPERATURE: 95.1 F | HEART RATE: 63 BPM

## 2022-02-22 DIAGNOSIS — M47.817 LUMBOSACRAL SPONDYLOSIS WITHOUT MYELOPATHY: ICD-10-CM

## 2022-02-22 DIAGNOSIS — M51.36 DDD (DEGENERATIVE DISC DISEASE), LUMBAR: ICD-10-CM

## 2022-02-22 DIAGNOSIS — M96.1 LUMBAR POSTLAMINECTOMY SYNDROME: Primary | ICD-10-CM

## 2022-02-22 DIAGNOSIS — M54.16 LUMBAR RADICULAR PAIN: ICD-10-CM

## 2022-02-22 PROCEDURE — 99213 OFFICE O/P EST LOW 20 MIN: CPT | Performed by: ANESTHESIOLOGY

## 2022-02-22 PROCEDURE — G8427 DOCREV CUR MEDS BY ELIG CLIN: HCPCS | Performed by: ANESTHESIOLOGY

## 2022-02-22 PROCEDURE — 3017F COLORECTAL CA SCREEN DOC REV: CPT | Performed by: ANESTHESIOLOGY

## 2022-02-22 PROCEDURE — G8484 FLU IMMUNIZE NO ADMIN: HCPCS | Performed by: ANESTHESIOLOGY

## 2022-02-22 PROCEDURE — G8417 CALC BMI ABV UP PARAM F/U: HCPCS | Performed by: ANESTHESIOLOGY

## 2022-02-22 PROCEDURE — 4004F PT TOBACCO SCREEN RCVD TLK: CPT | Performed by: ANESTHESIOLOGY

## 2022-02-22 NOTE — PROGRESS NOTES
[address]  [facility], [address] [place] [address] Patrick  [phone]    Follow up Note      Dayday Minor     Date of Visit:  2/22/2022    CC:  Patient presents for follow up   Chief Complaint   Patient presents with    Follow Up After Procedure     LUMBAR TRANSFORAMINAL EPIDURAL STEROID INJECTION LEFT L3 & RIGHT L4 UNDER XRAY GUIDANCE.       HPI:    Neck pain and left shoulder pain- S/P Left shoulder arthroscopy/ rotator cuff repair on 11/12/2021- has helped. Neck pain and shoulder pain has improved. Chronic low back pain for years- prior lumbar surgery in 2013 by Dr. Fermin Roberts. Continues to have low back and LE pain. Has been treated in the past at different pain centers. At some point she was on chronic pain medications. She did not like being on opioids and she discontinued going there. She has undergone imaging of the lumbar spine including MRI of the lumbar spine and flexion-extension x-rays. He has reviewed the results and treatment plan.     NOTE:  H/o CRI- does not want NSAID's BUN- 14, creatinine-1.3 on 11/8/2021.     Previous treatments:   Physical Therapy : yes, continues HEP     Medications: - NSAID's : H/o CRI- avoid NSAID's                       - Membrane stabilizers : yes -                       - Opioids : yes, in the past                        - Adjuvants or Others : yes,     Surgeries: yes, L5-S1 PLIF in 2013     She has not been on anticoagulation medications      She has not been on herbal supplements.       She is not diabetic.     H/O Smoking: yes  H/O alcohol abuse : no  H/O Illicit drug use : denies     Employment: stay at home     Imaging studies:    MRI of LS spine: 1/10/2022:  FINDINGS:   BONES/ALIGNMENT: Laminectomies from L4 to S1.  L5-S1 posterior and interbody   fusion.  Grade 1 anterolisthesis of L4 on L5.  Mild retrolisthesis of L3 on   L4.  Vertebral heights are normal.  Bone marrow signal is normal.       SPINAL CORD:  The conus terminates normally.       SOFT TISSUES: No abnormal enhancement is seen of the lumbar spine.  No   paraspinal mass identified.       L1-L2: There is no significant disc protrusion, spinal canal stenosis or   neural foraminal narrowing.       L2-L3: There is no significant disc protrusion, spinal canal stenosis or   neural foraminal narrowing.       L3-L4: Disc bulge, facet and ligament flavum hypertrophy cause mild thecal   sac, moderate right foraminal and severe left foraminal stenosis.       L4-L5: Anterolisthesis, disc bulge, facet and ligament flavum hypertrophy   cause mild thecal sac and moderate bilateral foraminal stenosis.       L5-S1: No stenosis.         Impression   Bilateral foraminal stenosis from L3 to L5. Xray LS flex/ Ext: 1/10/2022:      Impression   No instability evident.  Intact lumbosacral fusion hardware.  Unchanged grade   2 anterolisthesis of L4 on L5.  See above. Xray Cervical spine: 10/20/2021:  FINDINGS:   All 7 cervical vertebrae are visualized and appear normal in height and   alignment.   There is no evidence of prevertebral soft tissue edema or   fracture.  The base of the odontoid appears intact.           Impression   No acute abnormality of the cervical spine. Xray LS spine: 10/20/2021:  Impression   1. 1 cm anterolisthesis of L4 on L5 which is new when compared with the prior   CT scan of 04/14/2015. Lenetta Cave is likely associated spinal canal stenosis   secondary to the listhesis. 2. Previous transpedicular fusion of L5 and S1.  There is no hardware   complication.           MRI of left shoulder: 10/29/2021:( has undergone surgery since this MRI)  Impression   1.  Retracted full-thickness tearing of mid and posterior supraspinatus with   retraction of the torn fibers from the footplate measuring up to 1.9 cm.   2. Less than 50% multifocal partial-thickness articular-surface and   interstitial tearing of infraspinatus between musculotendinous junction and footplate.  Moderate underlying infraspinatus tendinopathy. 3. Mild tendinosis of the intra-articular long head of the biceps tendon. 4. Mild diffuse labral degeneration.  Mild glenohumeral chondromalacia. Moderate glenohumeral joint effusion. 5. Mild degenerative change of the left AC joint.         CT Lumbar spine: 3/2014:  Impression-    1. Postsurgical changes. Status post laminectomy at L4-L5 and L5-S1. There is fusion of L5-S1.   2. Arthritic changes in the facet joint. 3. No abscess or hematoma in the surgical bed.        OARRS report[de-identified] Reviewed today    Past Medical History:   Diagnosis Date    Chronic back pain     Chronic kidney disease     stage 3  follows with nephrology    Low back pain        Past Surgical History:   Procedure Laterality Date    CARPAL TUNNEL RELEASE Bilateral     CHOLECYSTECTOMY      GALLBLADDER SURGERY      LUMBAR FUSION  2/1/2013    PLIF  L5 - S1    NOSE SURGERY      four nose surgeries   ( d/t a fractures)    PAIN MANAGEMENT PROCEDURE N/A 2/1/2022    LUMBAR TRANSFORAMINAL EPIDURAL STEROID INJECTION LEFT L3 &RIGHT L4 UNDER XRAY GUIDANCE (54481) performed by Bello Ibanez MD at [facility] Right 02/09/2018    ROTATOR CUFF REPAIR Right     total of 2  surgeries    SHOULDER ARTHROSCOPY Right 02/21/2020    SHOULDER ARTHROSCOPY Right 2/21/2020    RIGHT SHOULDER ARTHROSCOPY. SUBACROMIAL DECOMPRESSION, LABRAL AND BICEP  DEBRIDEMENT, ROTATOR CUFF REPAIR performed by Omar Pal DO at [address] [facility] ARTHROSCOPY Left 11/12/2021    LEFT SHOULDER ARTHROSCOPY, SUBACROMIAL DECOMPRESSION,  ROTATOR CUFF REPAIR  (CPT 48295, 01577) [place]) performed by Omar Pal DO at [facility]       Prior to Admission medications    Medication Sig Start Date End Date Taking?  Authorizing Provider   traZODone (DESYREL) 150 MG tablet Take one tab po nightly 11/3/21  Yes Ina Joy DO   gabapentin (NEURONTIN) 600 MG tablet TAKE 1 TABLET BY MOUTH 2 TIMES DAILY AS NEEDED FOR PAIN  Patient taking differently: Take 600 mg by mouth daily. 11/3/21 5/9/22 Yes Yaakov Martin,    Naproxen Sodium (ALEVE) 220 MG CAPS Take by mouth 1 tablet PRN   Yes Historical Provider, MD   ibuprofen (ADVIL;MOTRIN) 200 MG tablet Take 200 mg by mouth every 6 hours as needed for Pain PRN   Yes Historical Provider, MD       Allergies   Allergen Reactions    Penicillin G Hives and Nausea And Vomiting       Social History     Socioeconomic History    Marital status:      Spouse name: Not on file    Number of children: Not on file    Years of education: Not on file    Highest education level: Not on file   Occupational History    Not on file   Tobacco Use    Smoking status: Current Every Day Smoker     Packs/day: 0.50     Years: 10.00     Pack years: 5.00     Types: Cigarettes     Start date: 11/24/2016    Smokeless tobacco: Never Used   Vaping Use    Vaping Use: Never used   Substance and Sexual Activity    Alcohol use: No    Drug use: No    Sexual activity: Not Currently   Other Topics Concern    Not on file   Social History Narrative    Not on file     Social Determinants of Health     Financial Resource Strain: Low Risk     Difficulty of Paying Living Expenses: Not hard at all   Food Insecurity: No Food Insecurity    Worried About [facility] in the Last Year: Never true    [address] in the Last Year: Never true   Transportation Needs:     Lack of Transportation (Medical): Not on file    Lack of Transportation (Non-Medical):  Not on file   Physical Activity:     Days of Exercise per Week: Not on file    Minutes of Exercise per Session: Not on file   Stress:     Feeling of Stress : Not on file   Social Connections:     Frequency of Communication with Friends and Family: Not on file    Frequency of Social Gatherings with Friends and Family: Not on file    Attends Scientology Services: Not on file   Kru[employer] Saliva Active surgery- healed well  Palpation: Tenderness paravertebral muscles Yes bilaterally  Range of motion: Decreased,  Sacroiliac joint tenderness Yes bilaterally  SLR : negative bilaterally     Musculoskeletal:     Trigger points no     Extremities:     Tremors:None bilaterally upper and lower  Edema:no  Distal LE peripheral pulses- well felt     Neurological:     Sensory: Normal to light touch      Motor:   No new focal deficits      Gait:normal Yes     Dermatology:     Skin:no rashes or lesions noted     Assessment/Plan:      Diagnosis Orders   1. Lumbar postlaminectomy syndrome     2. Lumbosacral spondylosis without myelopathy     3. Lumbar radicular pain     4. DDD (degenerative disc disease), lumbar          55 y.o.  female with H/o  low back / LE pain.        Chronic low back pain: Prior H/o L5-S1 PLIF in 2013 by Dr Linda Estrada. Has low back / LE pain. Prior interventions and pain meds at different pain center. Does not like pain meds and has discontinued. Failed conservative treatment: Physical therapy / HEP/ meds/ interventions. On Gabapentin 600 mg  Bid- helps moderately.     X-ray Flex / Ext LS spine - no instability. MRI of LS spine reviewed. S/p Lumbar TFESI - no significant long term relief. Plan:  Recommend NSG eval.    If no role for spine surgery, she may be a candidate for SCS trial. I have discussed this with her.      She will follow up with me after NSG eval.     Continues gabapentin to 600 mg bid.      H/o Smoking +     Counseling : Patient encouraged to stay active, watch weight and to continue Regular home exercise program as tolerated - stretching / strengthening.     Smoking cessation counseling : yes -stressed importance of smoking cessation on spine health and general health.     Treatment plan discussed with the patient including medication and procedure side effects.     Controlled Substances Monitoring:   OARRS reviewed     Hira Salgado MD    CC:  Tayo Cross, DO

## 2022-03-09 ENCOUNTER — OFFICE VISIT (OUTPATIENT)
Dept: ORTHOPEDIC SURGERY | Age: 57
End: 2022-03-09
Payer: COMMERCIAL

## 2022-03-09 VITALS — WEIGHT: 152 LBS | HEIGHT: 60 IN | TEMPERATURE: 98 F | BODY MASS INDEX: 29.84 KG/M2

## 2022-03-09 DIAGNOSIS — S46.012A TRAUMATIC COMPLETE TEAR OF LEFT ROTATOR CUFF, INITIAL ENCOUNTER: Primary | ICD-10-CM

## 2022-03-09 DIAGNOSIS — M75.42 SHOULDER IMPINGEMENT SYNDROME, LEFT: ICD-10-CM

## 2022-03-09 DIAGNOSIS — M75.22 BICEPS TENDINITIS OF LEFT UPPER EXTREMITY: ICD-10-CM

## 2022-03-09 PROCEDURE — 3017F COLORECTAL CA SCREEN DOC REV: CPT | Performed by: NURSE PRACTITIONER

## 2022-03-09 PROCEDURE — G8417 CALC BMI ABV UP PARAM F/U: HCPCS | Performed by: NURSE PRACTITIONER

## 2022-03-09 PROCEDURE — 99212 OFFICE O/P EST SF 10 MIN: CPT | Performed by: NURSE PRACTITIONER

## 2022-03-09 PROCEDURE — G8484 FLU IMMUNIZE NO ADMIN: HCPCS | Performed by: NURSE PRACTITIONER

## 2022-03-09 PROCEDURE — 4004F PT TOBACCO SCREEN RCVD TLK: CPT | Performed by: NURSE PRACTITIONER

## 2022-03-09 PROCEDURE — G8427 DOCREV CUR MEDS BY ELIG CLIN: HCPCS | Performed by: NURSE PRACTITIONER

## 2022-03-09 NOTE — PROGRESS NOTES
Chief Complaint   Patient presents with    Shoulder Pain     left shoulder RTC f/u doing well       [facility] [employer] returns today for follow up of her left shoulder rtc repair and bicep tenodesis. she reports that the pain in the shoulder is better. she has been going to physical therapy. she is also complaining of n/a. The patient is right dominant. The patient's pain level is a 0/10. The patient did respond to treatment. Past Medical History:   Diagnosis Date    Chronic back pain     Chronic kidney disease     stage 3  follows with nephrology    Low back pain      Past Surgical History:   Procedure Laterality Date    CARPAL TUNNEL RELEASE Bilateral     CHOLECYSTECTOMY      GALLBLADDER SURGERY      LUMBAR FUSION  2/1/2013    PLIF  L5 - S1    NOSE SURGERY      four nose surgeries   ( d/t a fractures)    PAIN MANAGEMENT PROCEDURE N/A 2/1/2022    LUMBAR TRANSFORAMINAL EPIDURAL STEROID INJECTION LEFT L3 &RIGHT L4 UNDER XRAY GUIDANCE (04722) performed by Hira Salgado MD at [facility] Right 02/09/2018    ROTATOR CUFF REPAIR Right     total of 2  surgeries    SHOULDER ARTHROSCOPY Right 02/21/2020    SHOULDER ARTHROSCOPY Right 2/21/2020    RIGHT SHOULDER ARTHROSCOPY.  SUBACROMIAL DECOMPRESSION, LABRAL AND BICEP  DEBRIDEMENT, ROTATOR CUFF REPAIR performed by Amadou King DO at [address] [facility] ARTHROSCOPY Left 11/12/2021    LEFT SHOULDER ARTHROSCOPY, SUBACROMIAL DECOMPRESSION,  ROTATOR CUFF REPAIR  (CPT 35732, 89936) ([facility]) performed by Amadou King DO at [address] [facility]       Current Outpatient Medications:     traZODone (DESYREL) 150 MG tablet, Take one tab po nightly, Disp: 90 tablet, Rfl: 1    gabapentin (NEURONTIN) 600 MG tablet, TAKE 1 TABLET BY MOUTH 2 TIMES DAILY AS NEEDED FOR PAIN (Patient taking differently: Take 600 mg by mouth daily. ), Disp: 60 tablet, Rfl: 5    Naproxen Sodium (ALEVE) 220 MG CAPS, Take by mouth 1 tablet PRN, Disp: , Rfl:     ibuprofen (ADVIL;MOTRIN) 200 MG tablet, Take 200 mg by mouth every 6 hours as needed for Pain PRN, Disp: , Rfl:   Allergies   Allergen Reactions    Penicillin G Hives and Nausea And Vomiting     Social History     Socioeconomic History    Marital status:      Spouse name: Not on file    Number of children: Not on file    Years of education: Not on file    Highest education level: Not on file   Occupational History    Not on file   Tobacco Use    Smoking status: Current Every Day Smoker     Packs/day: 0.50     Years: 10.00     Pack years: 5.00     Types: Cigarettes     Start date: 11/24/2016    Smokeless tobacco: Never Used   Vaping Use    Vaping Use: Never used   Substance and Sexual Activity    Alcohol use: No    Drug use: No    Sexual activity: Not Currently   Other Topics Concern    Not on file   Social History Narrative    Not on file     Social Determinants of Health     Financial Resource Strain: Low Risk     Difficulty of Paying Living Expenses: Not hard at all   Food Insecurity: No Food Insecurity    Worried About [facility] in the Last Year: Never true    [address] in the Last Year: Never true   Transportation Needs:     Lack of Transportation (Medical): Not on file    Lack of Transportation (Non-Medical):  Not on file   Physical Activity:     Days of Exercise per Week: Not on file    Minutes of Exercise per Session: Not on file   Stress:     Feeling of Stress : Not on file   Social Connections:     Frequency of Communication with Friends and Family: Not on file    Frequency of Social Gatherings with Friends and Family: Not on file    Attends Oriental orthodox Services: Not on file    Active Member of Clubs or Organizations: Not on file    Attends Club or Organization Meetings: Not on file    Marital Status: Not on file   Intimate Partner Violence:     Fear of Current or Ex-Partner: Not on file    Emotionally Abused: Not on file    Physically Abused: Not on file    Sexually Abused: Not on file   Housing Stability:     Unable to Pay for Housing in the Last Year: Not on file    Number of Places Lived in the Last Year: Not on file    Unstable Housing in the Last Year: Not on file     Family History   Problem Relation Age of Onset    Heart Disease Mother     Arthritis Mother     Diabetes Mother     High Blood Pressure Mother     Kidney Disease Mother     Heart Disease Father     High Blood Pressure Father     Arthritis Father     Stroke Father     Diabetes Sister     Arthritis Maternal Uncle     Diabetes Maternal Uncle     Kidney Disease Maternal Uncle     Arthritis Maternal Grandmother        REVIEW OF SYSTEMS:     General/Constitution:  (-)weight loss, (-)fever, (-)chills, (-)weakness. Skin: (-) rash,(-) psoriasis,(-) eczema, (-)skin cancer. Musculoskeletal: (-) fractures,  (-) dislocations,(-) collagen vascular disease, (-) fibromyalgia, (-) multiple sclerosis, (-) muscular dystrophy, (-) RSD,(-) joint pain (-)swelling, (-) joint pain,swelling. Neurologic: (-) epilepsy, (-)seizures,(-) brain tumor,(-) TIA, (-)stroke, (-)headaches, (-)Parkinson disease,(-) memory loss, (-) LOC. Cardiovascular: (-) Chest pain, (-) swelling in legs/feet, (-) SOB, (-) cramping in legs/feet with walking. Respiratory: (-) SOB, (-) Coughing, (-) night sweats. GI: (-) nausea, (-) vomiting, (-) diarrhea, (-) blood in stool, (-) gastric ulcer. Psychiatric: (-) Depression, (-) Anxiety, (-) bipolar disease, (-) Alzheimer's Disease  Allergic/Immunologic: (-) allergies latex, (-) allergies metal, (-) skin sensitivity. Hematlogic: (-) anemia, (-) blood transfusion, (-) DVT/PE, (-) Clotting disorders    SUBJECTIVE:    Constitution:  The patient is alert and oriented x 3, appears to be stated age and in no distress. @VS    Skin:  Upon inspection: the skin appears warm, dry and intact. There is  a previous scar over the affected area. There is not any cellulitis, lymphedema or cutaneous lesions noted in the lower extremities. Upon palpation there is no induration noted. Neurologic:  Gait: normal;  Motor exam of the upper extremities show: The reflexes in biceps/triceps/brachioradialis are equal and symmetric. Sensory exam C5-T1 are normal bilaterally. Cardiovascular: The vascular exam is normal and is well perfused to distal extremities. There are 2+ radial pulses bilaterally, and motor and sensation is intact to median, ulnar, and radial, musclocutaneus, and axillary nerve distribution and grossly symmetric bilaterally. There is cap refill noted less than two seconds in all digits. There is not edema of the bilateral upper extremities. There is not varicosities noted in the distal extremities. Lymph:  Upon palpation,  there is no lymphadenopathy noted in bilateral upper extremities. Musculoskeletal:  Gait: normal; examination of the nails and digits reveal no cyanosis or clubbing. Cervical Exam:  On physical exam, J Carlos Nose is well-developed, well-nourished, oriented to person, place and time. her gait is normal.  On evaluation of her cervical spine, she has full range of motion of the cervical spine without pain. There is no cervical tenderness to palpation. Shoulder Exam:   On evaluation of her bilaterally upper extremities, her left shoulder has no deformity. There is tenderness upon palpation of the n/a. There is not evidence of scapular dyskinesis. There is not muscle atrophy in shoulder girdle. The range of motion for the Right Shoulder is 150/50/t8 and for the Left shoulder is 140/45/t10. Right shoulder Motor strength is 5/5 in the supraspinatus, 5/5 internal rotation and 5/5 in external rotation, and Left shoulder motor strength 5/5 in supraspinatus, 5/5 in internal rotation, 5/5 in external rotation.         Right shoulder:  negative Impingement , negative Whitley ,negative  Speeds,negative  Apprehension ,negative Dean Load Shift, negative Binta manuver, negative Cross arm test.     Left shoulder:  negative Impingement , negative Whitley ,negative  Speeds,negative  Apprehension ,negative Dean Load Shift, negative Binta manuver, negative Cross arm test.     X-ray:  n/a    MRI:  n/a    Radiographic findings reviewed with patient        Impression:       Encounter Diagnoses   Name Primary?  Traumatic complete tear of left rotator cuff, initial encounter Yes    Shoulder impingement syndrome, left     Biceps tendinitis of left upper extremity        Plan:  Natural history and expected course discussed. Questions answered. Educational material distributed. Reduction in offending activity.   Gentle ROM exercises   Hep  Fu prn

## 2022-03-18 ENCOUNTER — OFFICE VISIT (OUTPATIENT)
Dept: NEUROSURGERY | Age: 57
End: 2022-03-18
Payer: COMMERCIAL

## 2022-03-18 VITALS
BODY MASS INDEX: 29.84 KG/M2 | OXYGEN SATURATION: 100 % | DIASTOLIC BLOOD PRESSURE: 82 MMHG | HEIGHT: 60 IN | RESPIRATION RATE: 16 BRPM | SYSTOLIC BLOOD PRESSURE: 128 MMHG | HEART RATE: 68 BPM | WEIGHT: 152 LBS | TEMPERATURE: 97 F

## 2022-03-18 DIAGNOSIS — M96.1 FAILED BACK SYNDROME OF LUMBAR SPINE: Primary | ICD-10-CM

## 2022-03-18 PROCEDURE — 99203 OFFICE O/P NEW LOW 30 MIN: CPT | Performed by: NEUROLOGICAL SURGERY

## 2022-03-18 PROCEDURE — 4004F PT TOBACCO SCREEN RCVD TLK: CPT | Performed by: NEUROLOGICAL SURGERY

## 2022-03-18 PROCEDURE — G8484 FLU IMMUNIZE NO ADMIN: HCPCS | Performed by: NEUROLOGICAL SURGERY

## 2022-03-18 PROCEDURE — G8417 CALC BMI ABV UP PARAM F/U: HCPCS | Performed by: NEUROLOGICAL SURGERY

## 2022-03-18 PROCEDURE — 3017F COLORECTAL CA SCREEN DOC REV: CPT | Performed by: NEUROLOGICAL SURGERY

## 2022-03-18 PROCEDURE — G8427 DOCREV CUR MEDS BY ELIG CLIN: HCPCS | Performed by: NEUROLOGICAL SURGERY

## 2022-03-20 NOTE — PROGRESS NOTES
[facility] NEUROSURGERY  [address]  [address] [id number] [phone]       Chief Complaint:   Chief Complaint   Patient presents with    Consultation     Back pain, Prev sx by Dr. David Sood L5-S1. Pt states her discs above that are now the problem. Has done 1 epidural with Dr. Rickey Muhammad at [facility] and he referred her here. Pain radiates down BLE       HPI:     I had the pleasure of seeing Herberth Jacob today in neurosurgical clinic. 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 Against this background then she actually underwent L5-S1 fusion by Dr. David Sood in 2013. She states that there was no improvement with surgery. She now presents with worsened lower extremity pain both anteriorly and posteriorly in a nondermatomal distribution that is worsened with laying supine. Back pain is also exacerbated with laying supine. Upon specific questioning she denied any loss of bowel or bladder function. She has undergone epidural steroid injection and without benefit according to the patient.     Past Medical History:   Diagnosis Date    Chronic back pain     Chronic kidney disease     stage 3  follows with nephrology    Low back pain      Past Surgical History:   Procedure Laterality Date    CARPAL TUNNEL RELEASE Bilateral     CHOLECYSTECTOMY      GALLBLADDER SURGERY      LUMBAR FUSION  2/1/2013    PLIF  L5 - S1    NOSE SURGERY      four nose surgeries   ( d/t a fractures)    PAIN MANAGEMENT PROCEDURE N/A 2/1/2022    LUMBAR TRANSFORAMINAL EPIDURAL STEROID INJECTION LEFT L3 &RIGHT L4 UNDER XRAY GUIDANCE (78176) performed by Ju Dia MD at [address] [place] [address] Right 02/09/2018    ROTATOR CUFF REPAIR Right     total of 2  surgeries    SHOULDER ARTHROSCOPY Right 02/21/2020    SHOULDER ARTHROSCOPY Right 2/21/2020    RIGHT SHOULDER ARTHROSCOPY. SUBACROMIAL DECOMPRESSION, LABRAL AND BICEP  DEBRIDEMENT, ROTATOR CUFF REPAIR performed by Dinorah Webster DO at [address] ARTHROSCOPY Left 11/12/2021    LEFT SHOULDER ARTHROSCOPY, SUBACROMIAL DECOMPRESSION,  ROTATOR CUFF REPAIR  (CPT 64548, 42974) Jolene Beto) performed by Dinorah Webster DO at [facility]      Family History   Problem Relation Age of Onset    Heart Disease Mother     Arthritis Mother     Diabetes Mother     High Blood Pressure Mother     Kidney Disease Mother     Heart Disease Father     High Blood Pressure Father     Arthritis Father     Stroke Father     Diabetes Sister     Arthritis Maternal Uncle     Diabetes Maternal Uncle     Kidney Disease Maternal Uncle     Arthritis Maternal Grandmother       Social History     Socioeconomic History    Marital status:      Spouse name: Not on file    Number of children: Not on file    Years of education: Not on file    Highest education level: Not on file   Occupational History    Not on file   Tobacco Use    Smoking status: Current Every Day Smoker     Packs/day: 0.50     Years: 10.00     Pack years: 5.00     Types: Cigarettes     Start date: 11/24/2016    Smokeless tobacco: Never Used    Tobacco comment: Cut down to approx 3 ciggrates a day. Vaping Use    Vaping Use: Never used   Substance and Sexual Activity    Alcohol use: No    Drug use: No    Sexual activity: Not Currently   Other Topics Concern    Not on file   Social History Narrative    Not on file     Social Determinants of Health     Financial Resource Strain: Low Risk     Difficulty of Paying Living Expenses: Not hard at all   Food Insecurity: No Food Insecurity    Worried About Running Out of Food in the Last Year: Never true    Ajit of Food in the Last Year: Never true   Transportation Needs:     Lack of Transportation (Medical):  Not on file    Lack of Transportation (Non-Medical): Not on file   Physical Activity:     Days of Exercise per Week: Not on file    Minutes of Exercise per Session: Not on file   Stress:     Feeling of Stress : Not on file   Social Connections:     Frequency of Communication with Friends and Family: Not on file    Frequency of Social Gatherings with Friends and Family: Not on file    Attends Buddhism Services: Not on file    Active Member of [address] [place] [address] or Organizations: Not on file    Attends Club or Organization Meetings: Not on file    Marital Status: Not on file   Intimate Partner Violence:     Fear of Current or Ex-Partner: Not on file    Emotionally Abused: Not on file    Physically Abused: Not on file    Sexually Abused: Not on file   Housing Stability:     Unable to Pay for Housing in the Last Year: Not on file    Number of [place] in the Last Year: Not on file    Unstable Housing in the Last Year: Not on file       Medications:   Current Outpatient Medications   Medication Sig Dispense Refill    traZODone (DESYREL) 150 MG tablet Take one tab po nightly 90 tablet 1    gabapentin (NEURONTIN) 600 MG tablet TAKE 1 TABLET BY MOUTH 2 TIMES DAILY AS NEEDED FOR PAIN (Patient taking differently: Take 600 mg by mouth daily. ) 60 tablet 5    Naproxen Sodium (ALEVE) 220 MG CAPS Take by mouth 1 tablet PRN      ibuprofen (ADVIL;MOTRIN) 200 MG tablet Take 200 mg by mouth every 6 hours as needed for Pain PRN       No current facility-administered medications for this visit. Allergies:    Penicillin g       Review of Systems:    Denies any chest pain, headache, dyspnea, recent weight loss, fevers, chills or night sweats. Physical Examination:    /82   Pulse 68   Temp 97 °F (36.1 °C) (Temporal)   Resp 16   Ht 5' (1.524 m)   Wt 152 lb (68.9 kg)   LMP 04/15/2015   SpO2 100%   BMI 29.69 kg/m²      On focused neurological examination, she  is awake alert oriented and rationally conversant.   Speech is clear and fluent. Pupils are equal and reactive to light bilaterally, extraocular movements are intact, visual fields are full to confrontation. Her  face is symmetric and grossly intact to fine touch. Uvula and tongue are both midline. Shoulder shrug is symmetric and strong. Cervical spine is well aligned and nontender to direct palpation. She  can forward flex, extend , laterally rotate and laterally extend without limitation or pain. Motor examination reveals preserved power in the upper and lower extremities at 5 out of 5 throughout. Reflexes are symmetric and brisk. Plantar responses are downgoing. There is no clonus. Patient is intact to fine touch in all dermatomes throughout. Straight leg raise is negative. Palpation of the spine reveals no kyphotic or scoliotic gross deformities. She  can forward flex to well below the knee. There is no pain to deep percussion nor palpation. ASSESSMENT:    I personally reviewed Mary Ann Wise radiographic images, particularly her MRI of the lumbar spine with and without contrast dated 10 January 2022 which does demonstrate some residual bilateral foraminal stenosis from L3-L5 and intact instrumentation. 1. Failed back syndrome of lumbar spine      MEDICAL DECISION MAKING & PLAN:    Reviewed the films with Ms. Ye explained the findings. Certainly there is an element of scar tissue formation as well. I did recommend to her that she should consider a spinal cord stimulator trial with pain management and should this be affected I would be happy to help by placing a permanent spinal cord stimulator paddle placement. She will return to clinic after her trial for further surgical discussion. Thank you so much for allowing us to participate in the care of this patient. Return in about 6 weeks (around 4/29/2022) for needs tests completed prior to appt, discuss results, discuss possible surgical options.       Electronically signed by Maynor Pacheco MD on 3/20/2022 at 1:59 PM       NOTE: This report was transcribed using voice recognition software.  Every effort was made to ensure accuracy; however, inadvertent computerized transcription errors may be present

## 2022-03-29 ENCOUNTER — HOSPITAL ENCOUNTER (EMERGENCY)
Age: 57
Discharge: HOME | End: 2022-03-30
Attending: EMERGENCY MEDICINE
Payer: COMMERCIAL

## 2022-03-29 ENCOUNTER — APPOINTMENT (OUTPATIENT)
Dept: GENERAL RADIOLOGY | Age: 57
End: 2022-03-29
Payer: COMMERCIAL
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
## 2022-03-29 DIAGNOSIS — S61.451A DOG BITE OF RIGHT HAND, INITIAL ENCOUNTER: Primary | ICD-10-CM

## 2022-03-29 DIAGNOSIS — R03.0 ELEVATED BLOOD PRESSURE READING: ICD-10-CM
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
## 2022-03-29 DIAGNOSIS — S61.452A DOG BITE OF LEFT HAND, INITIAL ENCOUNTER: ICD-10-CM

## 2022-03-29 PROCEDURE — 73130 X-RAY EXAM OF HAND: CPT

## 2022-03-29 PROCEDURE — 12002 RPR S/N/AX/GEN/TRNK2.6-7.5CM: CPT

## 2022-03-29 PROCEDURE — 6370000000 HC RX 637 (ALT 250 FOR IP): Performed by: EMERGENCY MEDICINE

## 2022-03-29 PROCEDURE — 99284 EMERGENCY DEPT VISIT MOD MDM: CPT

## 2022-03-29 RX ORDER — CEFUROXIME AXETIL 500 MG/1
500 TABLET ORAL EVERY 12 HOURS SCHEDULED
Status: DISCONTINUED | OUTPATIENT
Start: 2022-03-29 | End: 2022-03-30 | Stop reason: HOSPADM

## 2022-03-29 RX ORDER — IBUPROFEN 600 MG/1
600 TABLET ORAL ONCE
Status: COMPLETED | OUTPATIENT
Start: 2022-03-29 | End: 2022-03-29

## 2022-03-29 RX ORDER — METRONIDAZOLE 500 MG/1
500 TABLET ORAL ONCE
Status: COMPLETED | OUTPATIENT
Start: 2022-03-29 | End: 2022-03-29

## 2022-03-29 RX ORDER — LIDOCAINE AND PRILOCAINE 25; 25 MG/G; MG/G
CREAM TOPICAL ONCE
Status: DISCONTINUED | OUTPATIENT
Start: 2022-03-29 | End: 2022-03-29 | Stop reason: CLARIF

## 2022-03-29 RX ORDER — LIDOCAINE AND PRILOCAINE 25; 25 MG/G; MG/G
CREAM TOPICAL ONCE
Status: COMPLETED | OUTPATIENT
Start: 2022-03-29 | End: 2022-03-29

## 2022-03-29 RX ADMIN — IBUPROFEN 600 MG: 600 TABLET ORAL at 23:30

## 2022-03-29 RX ADMIN — LIDOCAINE AND PRILOCAINE: 25; 25 CREAM TOPICAL at 23:31

## 2022-03-29 RX ADMIN — METRONIDAZOLE 500 MG: 500 TABLET ORAL at 23:30

## 2022-03-29 ASSESSMENT — PAIN SCALES - GENERAL: PAINLEVEL_OUTOF10: 5

## 2022-03-30 VITALS
OXYGEN SATURATION: 98 % | HEART RATE: 84 BPM | SYSTOLIC BLOOD PRESSURE: 128 MMHG | DIASTOLIC BLOOD PRESSURE: 74 MMHG | WEIGHT: 152 LBS | HEIGHT: 60 IN | RESPIRATION RATE: 18 BRPM | TEMPERATURE: 98.1 F | BODY MASS INDEX: 29.84 KG/M2

## 2022-03-30 RX ORDER — IBUPROFEN 600 MG/1
600 TABLET ORAL EVERY 8 HOURS PRN
Qty: 21 TABLET | Refills: 0 | Status: SHIPPED | OUTPATIENT
Start: 2022-03-30 | End: 2022-04-11

## 2022-03-30 RX ORDER — HYDROCODONE BITARTRATE AND ACETAMINOPHEN 5; 325 MG/1; MG/1
1 TABLET ORAL EVERY 6 HOURS PRN
Qty: 12 TABLET | Refills: 0 | Status: SHIPPED | OUTPATIENT
Start: 2022-03-30 | End: 2022-04-02

## 2022-03-30 RX ORDER — METRONIDAZOLE 500 MG/1
500 TABLET ORAL ONCE
Status: DISCONTINUED | OUTPATIENT
Start: 2022-03-30 | End: 2022-03-30

## 2022-03-30 RX ORDER — CEFUROXIME AXETIL 500 MG/1
500 TABLET ORAL 2 TIMES DAILY
Qty: 14 TABLET | Refills: 0 | Status: SHIPPED | OUTPATIENT
Start: 2022-03-30 | End: 2022-04-06

## 2022-03-30 NOTE — ED PROVIDER NOTES
HPI:  3/29/22, Time: 10:28 PM EDT        Rommel Johnson is a 55 y.o. female presenting to the ED for dog bite wounds to hands bilaterally, beginning 1 hour ago. The complaint has been persistent, severe in severity, and worsened by nothing. Patient states she is able to move all digits of both hands. She was attempting to break up a dog fight states she was bitten by one of the dogs in the fight. Patient states her tetanus immunization is up-to-date. One of the dogs in a fight was her own and the other dog is known to her. Is believed that the dog's shots are up-to-date. Patient states she drove herself in for evaluation. Review of Systems:   A complete review of systems was performed and pertinent positives and negatives are stated within HPI, all other systems reviewed and are negative.    --------------------------------------------- PAST HISTORY ---------------------------------------------  Past Medical History:  has a past medical history of Chronic back pain, Chronic kidney disease, and Low back pain. Past Surgical History:  has a past surgical history that includes Nose surgery; Carpal tunnel release (Bilateral); lumbar fusion (02/01/2013); Cholecystectomy; parathyroidectomy (Right, 02/09/2018); Shoulder arthroscopy (Right, 02/21/2020); Rotator cuff repair (Right, 2008); Shoulder arthroscopy (Left, 11/12/2021); and Pain management procedure (N/A, 02/01/2022). Social History:  reports that she has been smoking cigarettes. She started smoking about 5 years ago. She has a 5.00 pack-year smoking history. She has never used smokeless tobacco. She reports that she does not drink alcohol and does not use drugs. Family History: family history includes Arthritis in her father, maternal grandmother, maternal uncle, and mother; Diabetes in her maternal uncle, mother, and sister;  Heart Disease in her father and mother; High Blood Pressure in her father and mother; Kidney Disease in her maternal uncle and mother; Stroke in her father. The patients home medications have been reviewed. Allergies: Penicillin g    -------------------------------------------------- RESULTS -------------------------------------------------  All laboratory and radiology results have been personally reviewed by myself   LABS:  No results found for this visit on 03/29/22. RADIOLOGY:  Interpreted by Radiologist.  XR HAND LEFT (MIN 3 VIEWS)   Final Result   1. Soft tissue irregularity and swelling about the metacarpal phalangeal   joint of the left index finger. No acute osseous findings about the left   hand. 2.  Soft tissue irregularity and swelling adjacent to the right thumb   metacarpal.  No acute osseous findings about the right hand. Normal   alignment. RECOMMENDATION:   In the setting of trauma, if there is persistent symptoms and physical exam   warrants a repeat radiograph in 10-14 days could be considered as occult   fractures may not be evident on initial imaging evaluation. XR HAND RIGHT (MIN 3 VIEWS)   Final Result   1. Soft tissue irregularity and swelling about the metacarpal phalangeal   joint of the left index finger. No acute osseous findings about the left   hand. 2.  Soft tissue irregularity and swelling adjacent to the right thumb   metacarpal.  No acute osseous findings about the right hand. Normal   alignment. RECOMMENDATION:   In the setting of trauma, if there is persistent symptoms and physical exam   warrants a repeat radiograph in 10-14 days could be considered as occult   fractures may not be evident on initial imaging evaluation.             ------------------------- NURSING NOTES AND VITALS REVIEWED ---------------------------    The nursing notes within the ED encounter and vital signs as below have been reviewed.    /74   Pulse 84   Temp 98.1 °F (36.7 °C) (Oral)   Resp 18   Ht 5' (1.524 m)   Wt 152 lb (68.9 kg)   LMP 04/15/2015   SpO2 98%   BMI 29.69 kg/m²  Oxygen Saturation Interpretation: Normal      ---------------------------------------------------PHYSICAL EXAM--------------------------------------      Constitutional/General: Alert and oriented x3, well appearing, non toxic in moderate distress  Head: Normocephalic and atraumatic  Eyes: PERRL, EOMI  Mouth: Oropharynx clear, handling secretions, no trismus  Neck: Supple, full ROM,   Pulmonary: Lungs clear to auscultation bilaterally, no wheezes, rales, or rhonchi. Not in respiratory distress  Cardiovascular:  Regular rate and rhythm, no murmurs, gallops, or rubs. 2+ distal pulses  Abdomen: Soft, non tender, non distended, otherwise normal  Extremities: Moves all extremities x 4. Warm and well perfused; patient has multiple puncture wounds and dog bite wounds to bilateral hands. The largest wound is on the base of the right thumb. Patient appears to have intact flexion extension abduction of the involved thumb she has full range of motion to flexion extension abduction of all digits of both hands although she has some pain with range of motion limited by pain. FDP and FDS tendon functions bilaterally appear to be intact. She has no arterial bleeding wounds and no evidence of any foreign bodies on evaluation of the wounds in a bloodless field following irrigation  Skin: warm and dry without rash  Neurologic: GCS 15, cranial nerves grossly intact no focal deficits. Good sensation radial median ulnar nerve distributions of both hands.   Psych: Normal Affect      ------------------------------ ED COURSE/MEDICAL DECISION MAKING----------------------  Medications   ibuprofen (ADVIL;MOTRIN) tablet 600 mg (600 mg Oral Given 3/29/22 2330)   metroNIDAZOLE (FLAGYL) tablet 500 mg (500 mg Oral Given 3/29/22 2330)   lidocaine-prilocaine (EMLA) cream ( Topical Given 3/29/22 2331)         ED COURSE:     Medical Decision Making:   Patient was counseled regarding the reason for not not performing tight primary closure at this time. Patient understands that there is potential risk for abscess formation. She is given strict advisement to follow-up with a hand surgeon on-call to whom she is referred in 2 days. She understands she is to get immediate medical attention if any new/worsening symptoms occur. PROCEDURE NOTE  3/30/22         LACERATION REPAIR  Risks, benefits and alternatives (for applicable procedures below) described. Performed By: Lucendia Severin, MD.  Informed consent: Verbal consent obtained. The patient was counseled regarding the procedure in person, it's indications, risks, potential complications and alternatives and any questions were answered. Verbal consent was obtained. Laceration #: 1. Location: R Thumb  Length: 3.5 cm. The wound area was irrigated with sterile saline, cleansed with chlorhexidine gluconate and draped in a sterile fashion. Local Anesthesia:  obtained with Bupivacaine 0.25% without epinephrine. The wound was explored with the following results: Thickness: full thickness. no foreign body or tendon injury seen. Debridement: None. Undermining: None. Wound Margins Revised: None. Flaps Aligned: yes. The wound was closed in loose primary closure with #2  4-0 Ethilon using interrupted suture(s). Dressing:  bacitracin, a gauze dressing and vaseline soaked gauze. There were no additional wounds appropriate for closure. Counseling: The emergency provider has spoken with the patient and discussed todays results, in addition to providing specific details for the plan of care and counseling regarding the diagnosis and prognosis. Questions are answered at this time and they are agreeable with the plan. Controlled Substance Monitoring:  Acute and Chronic Pain Monitoring:   RX Monitoring 7/23/2021   Attestation -   Periodic Controlled Substance Monitoring No signs of potential drug abuse or diversion identified.    Chronic Pain > 80 MEDD -     --------------------------------- IMPRESSION AND DISPOSITION ---------------------------------    IMPRESSION  1. Dog bite of right hand, initial encounter    2. Dog bite of left hand, initial encounter    3. Elevated blood pressure reading        DISPOSITION  Disposition: Discharge to home  Patient condition is stable      NOTE: This report was transcribed using voice recognition software.  Every effort was made to ensure accuracy; however, inadvertent computerized transcription errors may be present        Lefty Saldana MD  04/08/22 [id number]

## 2022-04-04 ENCOUNTER — OFFICE VISIT (OUTPATIENT)
Dept: ORTHOPEDIC SURGERY | Age: 57
End: 2022-04-04
Payer: COMMERCIAL

## 2022-04-04 VITALS — BODY MASS INDEX: 29.84 KG/M2 | WEIGHT: 152 LBS | HEIGHT: 60 IN

## 2022-04-04 DIAGNOSIS — S64.31XA INJURY OF DIGITAL NERVE OF RIGHT THUMB, INITIAL ENCOUNTER: Primary | ICD-10-CM

## 2022-04-04 PROCEDURE — G8427 DOCREV CUR MEDS BY ELIG CLIN: HCPCS | Performed by: ORTHOPAEDIC SURGERY

## 2022-04-04 PROCEDURE — 99204 OFFICE O/P NEW MOD 45 MIN: CPT | Performed by: ORTHOPAEDIC SURGERY

## 2022-04-04 PROCEDURE — 3017F COLORECTAL CA SCREEN DOC REV: CPT | Performed by: ORTHOPAEDIC SURGERY

## 2022-04-04 PROCEDURE — 4004F PT TOBACCO SCREEN RCVD TLK: CPT | Performed by: ORTHOPAEDIC SURGERY

## 2022-04-04 PROCEDURE — G8417 CALC BMI ABV UP PARAM F/U: HCPCS | Performed by: ORTHOPAEDIC SURGERY

## 2022-04-04 NOTE — PROGRESS NOTES
Department of Orthopedic Surgery  History and Physical      CHIEF COMPLAINT: Dog bite injury    HISTORY OF PRESENT ILLNESS:                The patient is a LHD 55 y.o. female who presents with a dog bite injury. Patient states last Tuesday she was breaking up a fight between 2 dogs when she sustained a dog bite most significantly to the right thumb. She went to the emergency department where this was irrigated and 1 sutures placed to the right thumb. She was placed on antibiotics. She states she is still taking her antibiotics. She does describe numbness to her right thumb. She currently is not working. Patient did previously have a left thumb trigger release and left carpal tunnel release in [facility] [employer]. Past Medical History:        Diagnosis Date    Chronic back pain     Chronic kidney disease     stage 3  follows with nephrology    Low back pain      Past Surgical History:        Procedure Laterality Date    CARPAL TUNNEL RELEASE Bilateral     CHOLECYSTECTOMY      LUMBAR FUSION  02/01/2013    PLIF  L5 - S1    NOSE SURGERY      four nose surgeries   ( d/t a fractures)    PAIN MANAGEMENT PROCEDURE N/A 02/01/2022    LUMBAR TRANSFORAMINAL EPIDURAL STEROID INJECTION LEFT L3 &RIGHT L4 UNDER XRAY GUIDANCE (27048) performed by Jennifer Gee MD at [facility] Right 02/09/2018    ROTATOR CUFF REPAIR Right 2008    R rotator cuff repair    SHOULDER ARTHROSCOPY Right 02/21/2020    RIGHT SHOULDER ARTHROSCOPY. SUBACROMIAL DECOMPRESSION, LABRAL AND BICEP  DEBRIDEMENT, ROTATOR CUFF REPAIR performed by Darrin Lanza DO at [address] [facility] ARTHROSCOPY Left 11/12/2021    LEFT SHOULDER ARTHROSCOPY, SUBACROMIAL DECOMPRESSION,  ROTATOR CUFF REPAIR  (CPT 23600, 56637) ([facility]REX) performed by Darrin Lanza DO at [facility]     Current Medications:   No current facility-administered medications for this visit.   Allergies:  Penicillin g    Social History:   TOBACCO: reports that she has been smoking cigarettes. She started smoking about 5 years ago. She has a 5.00 pack-year smoking history. She has never used smokeless tobacco.  ETOH:   reports no history of alcohol use. DRUGS:   reports no history of drug use. ACTIVITIES OF DAILY LIVING:    OCCUPATION:    Family History:       Problem Relation Age of Onset    Heart Disease Mother     Arthritis Mother     Diabetes Mother     High Blood Pressure Mother     Kidney Disease Mother     Heart Disease Father     High Blood Pressure Father     Arthritis Father     Stroke Father     Diabetes Sister     Arthritis Maternal Uncle     Diabetes Maternal Uncle     Kidney Disease Maternal Uncle     Arthritis Maternal Grandmother        REVIEW OF SYSTEMS:  CONSTITUTIONAL:  negative  EYES:  negative  HEENT:  negative  RESPIRATORY:  negative  CARDIOVASCULAR:  negative  GASTROINTESTINAL:  negative  GENITOURINARY:  negative  INTEGUMENT/BREAST:  negative  HEMATOLOGIC/LYMPHATIC:  negative  ALLERGIC/IMMUNOLOGIC:  negative  ENDOCRINE:  negative  MUSCULOSKELETAL:  Right thumb dog bite laceration  NEUROLOGICAL:  Thumb numbness  BEHAVIOR/PSYCH:  negative    PHYSICAL EXAM:    VITALS:  Ht 5' (1.524 m)   Wt 152 lb (68.9 kg)   LMP 04/15/2015   BMI 29.69 kg/m²   CONSTITUTIONAL:  awake, alert, cooperative, no apparent distress, and appears stated age  EYES:  Lids and lashes normal, pupils equal, round and reactive to light, extra ocular muscles intact, sclera clear, conjunctiva normal  ENT:  Normocephalic, without obvious abnormality, atraumatic, sinuses nontender on palpation, external ears without lesions, oral pharynx with moist mucus membranes, tonsils without erythema or exudates, gums normal and good dentition.   NECK:  Supple, symmetrical, trachea midline, no adenopathy, thyroid symmetric, not enlarged and no tenderness, skin normal  LUNGS:  CTA  CARDIOVASCULAR:  2+ radial pulses, extremities warm and well perfused  ABDOMEN: NTTP  CHEST:  Atraumatic   GENITAL/URINARY:  deferred  NEUROLOGIC:  Awake, alert, oriented to name, place and time. Cranial nerves II-XII are grossly intact. Motor is 5 out of 5 bilaterally. Sensory is intact.  gait is normal.  MUSCULOSKELETAL:    Right upper extremity: Laceration to the volar aspect of the thumb MCP joint. 2 puncture wounds dorsally to the thumb. + swelling to the thumb with ecchymosis. - erythema or signs of infection. Patient able to flex and extend the thumb. Diminished sensation to the thumb radial and ulnar digital nerve. Stiffness with flexion and extension of the fingers. Brisk capillary refill. Otherwise NVI. Left upper extremity: 2 puncture wounds to the dorsal aspect of the index finger. No erythema, drainage, or signs of infection. Stiffness with flexion and extension of the fingers. Brisk capillary refill. Otherwise NVI. DATA:    CBC:   Lab Results   Component Value Date    WBC 5.9 11/08/2021    RBC 5.23 11/08/2021    HGB 14.8 11/08/2021    HCT 44.3 11/08/2021    MCV 84.7 11/08/2021    MCH 28.3 11/08/2021    MCHC 33.4 11/08/2021    RDW 12.4 11/08/2021     11/08/2021    MPV 11.1 11/08/2021     PT/INR:    Lab Results   Component Value Date    PROTIME 10.5 04/14/2015    PROTIME 11.9 12/01/2010    INR 1.0 04/14/2015       Radiology Review:  Xray: x-rays of the right and left hand were reviewed from March 29 of 2022.  3 views: AP, lateral, oblique demonstrate no acute fractures or dislocations. Impression: No acute fractures or dislocations. IMPRESSION:  · Right thumb dog bite with nerve injury  · CKD    PLAN:  Discussed findings with the patient at today's visit. Discussed conservative and surgical management with the patient. Recommended surgical exploration. Plan for a right thumb exploration with excisional debridement and possible nerve repair versus reconstruction. Postoperative course explained the patient. Patient like to proceed.   All questions answered. I explained the risks, benefits, alternatives and complications of surgery with the patient including but not limited to the risks of infection, possible damage to nerves, vessels, or tendons, stiffness, loss of range of motion, scar sensitivity, wound healing complications, worsening symptoms, possible need for therapy, as well as the possible need further surgery and unanticipated complications. The patient voiced understanding and all questions were answered. The patient elected to proceed with surgical intervention. I have seen and evaluated the patient and agree with the above assessment and plan on today's visit. I have performed the key components of the history and physical examination with significant findings of right thumb dog bite injury with laceration and underlying nerve injury. She has complete absence of sensation distally. Discussed complexity of crush injury versus laceration. Recommend surgical exploration with nerve reconstruction as needed. All questions answered. Complexity of the injury as well as incomplete recovery of sensation was explained as well as permanent loss of function. . I concur with the findings and plan as documented.     Lilibeth Goodrich MD  4/4/2022

## 2022-04-07 ENCOUNTER — PREP FOR PROCEDURE (OUTPATIENT)
Dept: ORTHOPEDIC SURGERY | Age: 57
End: 2022-04-07

## 2022-04-07 RX ORDER — SODIUM CHLORIDE 0.9 % (FLUSH) 0.9 %
5-40 SYRINGE (ML) INJECTION EVERY 12 HOURS SCHEDULED
Status: CANCELLED | OUTPATIENT
Start: 2022-04-07

## 2022-04-07 RX ORDER — SODIUM CHLORIDE 0.9 % (FLUSH) 0.9 %
5-40 SYRINGE (ML) INJECTION PRN
Status: CANCELLED | OUTPATIENT
Start: 2022-04-07

## 2022-04-07 RX ORDER — SODIUM CHLORIDE 9 MG/ML
INJECTION, SOLUTION INTRAVENOUS CONTINUOUS
Status: CANCELLED | OUTPATIENT
Start: 2022-04-07

## 2022-04-07 RX ORDER — SODIUM CHLORIDE 9 MG/ML
25 INJECTION, SOLUTION INTRAVENOUS PRN
Status: CANCELLED | OUTPATIENT
Start: 2022-04-07

## 2022-04-11 NOTE — PROGRESS NOTES
Have you been tested for COVID  No           Have you been told you were positive for COVID No  Have you had any known exposure to someone that is positive for COVID No  Do you have a cough                   No              Do you have shortness of breath No                 Do you have a sore throat            No                Are you having chills                    No                Are you having muscle aches. No                    Please come to the hospital wearing a mask and have your significant other wear a mask as well. Both of you should check your temperature before leaving to come here,  if it is 100 or higher please call [phone] for instruction. [facility] PRE-ADMISSION TESTING INSTRUCTIONS    The Preadmission Testing patient is instructed accordingly using the following criteria (check applicable):    ARRIVAL INSTRUCTIONS:  [x] Parking the day of Surgery is located in the Main Entrance lot. Upon entering the door, make an immediate right to the surgery reception desk    [x] Bring photo ID and insurance card    [] Bring in a copy of Living will or Durable Power of  papers.     [x] Please be sure to arrange for responsible adult to provide transportation to and from the hospital    [x] Please arrange for responsible adult to be with you for the 24 hour period post procedure due to having anesthesia      GENERAL INSTRUCTIONS:    [x] Nothing by mouth after midnight, including gum, candy, mints or water    [x] You may brush your teeth, but do not swallow any water    [] Take medications as instructed with 1-2 oz of water    [] Stop herbal supplements and vitamins 5 days prior to procedure    [] Follow preop dosing of blood thinners per physician instructions    [] Take 1/2 dose of evening insulin, but no insulin after midnight    [] No oral diabetic medications after midnight    [] If diabetic and have low blood sugar or feel symptomatic, take 1-2oz apple juice only    [] Bring inhalers day of surgery    [] Bring C-PAP/ Bi-Pap day of surgery    [] Bring urine specimen day of surgery    [x] Shower or bath with soap, lather and rinse well, AM of Surgery, no lotion, powders or creams to surgical site    [] Follow bowel prep as instructed per surgeon    [x] No tobacco products within 24 hours of surgery     [x] No alcohol or illegal drug use within 24 hours of surgery.     [x] Jewelry, body piercing's, eyeglasses, contact lenses and dentures are not permitted into surgery (bring cases)      [x] Please do not wear any nail polish, make up or hair products on the day of surgery    [x] You can expect a call the business day prior to procedure to notify you if your arrival time changes    [x] If you receive a survey after surgery we would greatly appreciate your comments    [] Parent/guardian of a minor must accompany their child and remain on the premises  the entire time they are under our care     [] Pediatric patients may bring favorite toy, blanket or comfort item with them    [] A caregiver or family member must remain with the patient during their stay if they are mentally handicapped, have dementia, disoriented or unable to use a call light or would be a safety concern if left unattended    [x] Please notify surgeon if you develop any illness between now and time of surgery (cold, cough, sore throat, fever, nausea, vomiting) or any signs of infections  including skin, wounds, and dental.    [x]  The Outpatient Pharmacy is available to fill your prescription here on your day of surgery, ask your preop nurse for details    [] Other instructions    EDUCATIONAL MATERIALS PROVIDED:    [] PAT Preoperative Education Packet/Booklet     [] Medication List    [] Transfusion bracelet applied with instructions    [] Shower with soap, lather and rinse well, and use CHG wipes provided the evening before surgery as instructed    [] Incentive spirometer with instructions

## 2022-04-13 ENCOUNTER — ANESTHESIA EVENT (OUTPATIENT)
Dept: OPERATING ROOM | Age: 57
End: 2022-04-13
Payer: COMMERCIAL

## 2022-04-13 ENCOUNTER — ANESTHESIA (OUTPATIENT)
Dept: OPERATING ROOM | Age: 57
End: 2022-04-13
Payer: COMMERCIAL

## 2022-04-13 ENCOUNTER — HOSPITAL ENCOUNTER (OUTPATIENT)
Age: 57
Setting detail: OUTPATIENT SURGERY
Discharge: HOME | End: 2022-04-13
Attending: ORTHOPAEDIC SURGERY | Admitting: ORTHOPAEDIC SURGERY
Payer: COMMERCIAL

## 2022-04-13 VITALS
TEMPERATURE: 97.2 F | WEIGHT: 152 LBS | DIASTOLIC BLOOD PRESSURE: 79 MMHG | HEART RATE: 73 BPM | HEIGHT: 60 IN | BODY MASS INDEX: 29.84 KG/M2 | RESPIRATION RATE: 16 BRPM | SYSTOLIC BLOOD PRESSURE: 141 MMHG | OXYGEN SATURATION: 96 %

## 2022-04-13 VITALS
TEMPERATURE: 94.1 F | DIASTOLIC BLOOD PRESSURE: 77 MMHG | OXYGEN SATURATION: 99 % | RESPIRATION RATE: 20 BRPM | SYSTOLIC BLOOD PRESSURE: 125 MMHG

## 2022-04-13 DIAGNOSIS — G89.18 POST-OPERATIVE PAIN: Primary | ICD-10-CM

## 2022-04-13 PROCEDURE — 7100000010 HC PHASE II RECOVERY - FIRST 15 MIN: Performed by: ORTHOPAEDIC SURGERY

## 2022-04-13 PROCEDURE — 6370000000 HC RX 637 (ALT 250 FOR IP): Performed by: ANESTHESIOLOGY

## 2022-04-13 PROCEDURE — 7100000000 HC PACU RECOVERY - FIRST 15 MIN: Performed by: ORTHOPAEDIC SURGERY

## 2022-04-13 PROCEDURE — 3700000001 HC ADD 15 MINUTES (ANESTHESIA): Performed by: ORTHOPAEDIC SURGERY

## 2022-04-13 PROCEDURE — 6360000002 HC RX W HCPCS: Performed by: NURSE ANESTHETIST, CERTIFIED REGISTERED

## 2022-04-13 PROCEDURE — 3600000012 HC SURGERY LEVEL 2 ADDTL 15MIN: Performed by: ORTHOPAEDIC SURGERY

## 2022-04-13 PROCEDURE — 64702 REVISE FINGER/TOE NERVE: CPT | Performed by: ORTHOPAEDIC SURGERY

## 2022-04-13 PROCEDURE — 7100000011 HC PHASE II RECOVERY - ADDTL 15 MIN: Performed by: ORTHOPAEDIC SURGERY

## 2022-04-13 PROCEDURE — 93005 ELECTROCARDIOGRAM TRACING: CPT

## 2022-04-13 PROCEDURE — 7100000001 HC PACU RECOVERY - ADDTL 15 MIN: Performed by: ORTHOPAEDIC SURGERY

## 2022-04-13 PROCEDURE — 2580000003 HC RX 258: Performed by: PHYSICIAN ASSISTANT

## 2022-04-13 PROCEDURE — 6360000002 HC RX W HCPCS: Performed by: PHYSICIAN ASSISTANT

## 2022-04-13 PROCEDURE — 3600000002 HC SURGERY LEVEL 2 BASE: Performed by: ORTHOPAEDIC SURGERY

## 2022-04-13 PROCEDURE — 2500000003 HC RX 250 WO HCPCS: Performed by: NURSE ANESTHETIST, CERTIFIED REGISTERED

## 2022-04-13 PROCEDURE — 3700000000 HC ANESTHESIA ATTENDED CARE: Performed by: ORTHOPAEDIC SURGERY

## 2022-04-13 PROCEDURE — 6360000002 HC RX W HCPCS: Performed by: ANESTHESIOLOGY

## 2022-04-13 PROCEDURE — 2709999900 HC NON-CHARGEABLE SUPPLY: Performed by: ORTHOPAEDIC SURGERY

## 2022-04-13 PROCEDURE — 2500000003 HC RX 250 WO HCPCS: Performed by: ORTHOPAEDIC SURGERY

## 2022-04-13 RX ORDER — GLYCOPYRROLATE 1 MG/5 ML
SYRINGE (ML) INTRAVENOUS PRN
Status: DISCONTINUED | OUTPATIENT
Start: 2022-04-13 | End: 2022-04-13 | Stop reason: SDUPTHER

## 2022-04-13 RX ORDER — ACETAMINOPHEN 500 MG
1000 TABLET ORAL ONCE
Status: COMPLETED | OUTPATIENT
Start: 2022-04-13 | End: 2022-04-13

## 2022-04-13 RX ORDER — MEPERIDINE HYDROCHLORIDE 25 MG/ML
12.5 INJECTION INTRAMUSCULAR; INTRAVENOUS; SUBCUTANEOUS EVERY 5 MIN PRN
Status: DISCONTINUED | OUTPATIENT
Start: 2022-04-13 | End: 2022-04-13 | Stop reason: HOSPADM

## 2022-04-13 RX ORDER — FENTANYL CITRATE 50 UG/ML
INJECTION, SOLUTION INTRAMUSCULAR; INTRAVENOUS PRN
Status: DISCONTINUED | OUTPATIENT
Start: 2022-04-13 | End: 2022-04-13 | Stop reason: SDUPTHER

## 2022-04-13 RX ORDER — SODIUM CHLORIDE 0.9 % (FLUSH) 0.9 %
5-40 SYRINGE (ML) INJECTION PRN
Status: DISCONTINUED | OUTPATIENT
Start: 2022-04-13 | End: 2022-04-13 | Stop reason: HOSPADM

## 2022-04-13 RX ORDER — SODIUM CHLORIDE 0.9 % (FLUSH) 0.9 %
5-40 SYRINGE (ML) INJECTION EVERY 12 HOURS SCHEDULED
Status: DISCONTINUED | OUTPATIENT
Start: 2022-04-13 | End: 2022-04-13 | Stop reason: HOSPADM

## 2022-04-13 RX ORDER — OXYCODONE HYDROCHLORIDE 5 MG/1
10 TABLET ORAL PRN
Status: COMPLETED | OUTPATIENT
Start: 2022-04-13 | End: 2022-04-13

## 2022-04-13 RX ORDER — OXYCODONE HYDROCHLORIDE 5 MG/1
5 TABLET ORAL PRN
Status: COMPLETED | OUTPATIENT
Start: 2022-04-13 | End: 2022-04-13

## 2022-04-13 RX ORDER — PROPOFOL 10 MG/ML
INJECTION, EMULSION INTRAVENOUS PRN
Status: DISCONTINUED | OUTPATIENT
Start: 2022-04-13 | End: 2022-04-13 | Stop reason: SDUPTHER

## 2022-04-13 RX ORDER — DIPHENHYDRAMINE HYDROCHLORIDE 50 MG/ML
12.5 INJECTION INTRAMUSCULAR; INTRAVENOUS
Status: DISCONTINUED | OUTPATIENT
Start: 2022-04-13 | End: 2022-04-13 | Stop reason: HOSPADM

## 2022-04-13 RX ORDER — ONDANSETRON 2 MG/ML
INJECTION INTRAMUSCULAR; INTRAVENOUS PRN
Status: DISCONTINUED | OUTPATIENT
Start: 2022-04-13 | End: 2022-04-13 | Stop reason: SDUPTHER

## 2022-04-13 RX ORDER — OXYCODONE HYDROCHLORIDE AND ACETAMINOPHEN 5; 325 MG/1; MG/1
1 TABLET ORAL EVERY 6 HOURS PRN
Qty: 28 TABLET | Refills: 0 | Status: SHIPPED | OUTPATIENT
Start: 2022-04-13 | End: 2022-04-20

## 2022-04-13 RX ORDER — SODIUM CHLORIDE 9 MG/ML
INJECTION, SOLUTION INTRAVENOUS CONTINUOUS
Status: DISCONTINUED | OUTPATIENT
Start: 2022-04-13 | End: 2022-04-13 | Stop reason: HOSPADM

## 2022-04-13 RX ORDER — DEXAMETHASONE SODIUM PHOSPHATE 4 MG/ML
INJECTION, SOLUTION INTRA-ARTICULAR; INTRALESIONAL; INTRAMUSCULAR; INTRAVENOUS; SOFT TISSUE PRN
Status: DISCONTINUED | OUTPATIENT
Start: 2022-04-13 | End: 2022-04-13 | Stop reason: SDUPTHER

## 2022-04-13 RX ORDER — MIDAZOLAM HYDROCHLORIDE 1 MG/ML
INJECTION INTRAMUSCULAR; INTRAVENOUS PRN
Status: DISCONTINUED | OUTPATIENT
Start: 2022-04-13 | End: 2022-04-13 | Stop reason: SDUPTHER

## 2022-04-13 RX ORDER — SODIUM CHLORIDE 9 MG/ML
25 INJECTION, SOLUTION INTRAVENOUS PRN
Status: DISCONTINUED | OUTPATIENT
Start: 2022-04-13 | End: 2022-04-13 | Stop reason: HOSPADM

## 2022-04-13 RX ORDER — KETOROLAC TROMETHAMINE 30 MG/ML
INJECTION, SOLUTION INTRAMUSCULAR; INTRAVENOUS PRN
Status: DISCONTINUED | OUTPATIENT
Start: 2022-04-13 | End: 2022-04-13 | Stop reason: SDUPTHER

## 2022-04-13 RX ORDER — BUPIVACAINE HYDROCHLORIDE 5 MG/ML
INJECTION, SOLUTION EPIDURAL; INTRACAUDAL PRN
Status: DISCONTINUED | OUTPATIENT
Start: 2022-04-13 | End: 2022-04-13 | Stop reason: ALTCHOICE

## 2022-04-13 RX ORDER — SODIUM CHLORIDE 9 MG/ML
INJECTION, SOLUTION INTRAVENOUS PRN
Status: DISCONTINUED | OUTPATIENT
Start: 2022-04-13 | End: 2022-04-13 | Stop reason: HOSPADM

## 2022-04-13 RX ADMIN — ACETAMINOPHEN 1000 MG: 500 TABLET ORAL at 07:43

## 2022-04-13 RX ADMIN — OXYCODONE HYDROCHLORIDE 5 MG: 5 TABLET ORAL at 11:00

## 2022-04-13 RX ADMIN — MIDAZOLAM 2 MG: 1 INJECTION INTRAMUSCULAR; INTRAVENOUS at 08:54

## 2022-04-13 RX ADMIN — Medication 2000 MG: at 08:57

## 2022-04-13 RX ADMIN — PROPOFOL 200 MG: 10 INJECTION, EMULSION INTRAVENOUS at 09:00

## 2022-04-13 RX ADMIN — DEXAMETHASONE SODIUM PHOSPHATE 8 MG: 4 INJECTION, SOLUTION INTRAMUSCULAR; INTRAVENOUS at 09:05

## 2022-04-13 RX ADMIN — HYDROMORPHONE HYDROCHLORIDE 0.25 MG: 1 INJECTION, SOLUTION INTRAMUSCULAR; INTRAVENOUS; SUBCUTANEOUS at 10:20

## 2022-04-13 RX ADMIN — FENTANYL CITRATE 50 MCG: 50 INJECTION, SOLUTION INTRAMUSCULAR; INTRAVENOUS at 09:21

## 2022-04-13 RX ADMIN — KETOROLAC TROMETHAMINE 30 MG: 30 INJECTION, SOLUTION INTRAMUSCULAR; INTRAVENOUS at 09:45

## 2022-04-13 RX ADMIN — Medication 0.2 MG: at 09:00

## 2022-04-13 RX ADMIN — HYDROMORPHONE HYDROCHLORIDE 0.5 MG: 1 INJECTION, SOLUTION INTRAMUSCULAR; INTRAVENOUS; SUBCUTANEOUS at 10:26

## 2022-04-13 RX ADMIN — SODIUM CHLORIDE: 9 INJECTION, SOLUTION INTRAVENOUS at 08:55

## 2022-04-13 RX ADMIN — HYDROMORPHONE HYDROCHLORIDE 0.25 MG: 1 INJECTION, SOLUTION INTRAMUSCULAR; INTRAVENOUS; SUBCUTANEOUS at 10:15

## 2022-04-13 RX ADMIN — ONDANSETRON 4 MG: 2 INJECTION INTRAMUSCULAR; INTRAVENOUS at 09:05

## 2022-04-13 RX ADMIN — SODIUM CHLORIDE: 9 INJECTION, SOLUTION INTRAVENOUS at 08:53

## 2022-04-13 RX ADMIN — FENTANYL CITRATE 100 MCG: 50 INJECTION, SOLUTION INTRAMUSCULAR; INTRAVENOUS at 09:00

## 2022-04-13 ASSESSMENT — PULMONARY FUNCTION TESTS
PIF_VALUE: 3
PIF_VALUE: 15
PIF_VALUE: 0
PIF_VALUE: 15
PIF_VALUE: 3
PIF_VALUE: 15
PIF_VALUE: 1
PIF_VALUE: 15
PIF_VALUE: 15
PIF_VALUE: 11
PIF_VALUE: 1
PIF_VALUE: 15
PIF_VALUE: 15
PIF_VALUE: 2
PIF_VALUE: 15
PIF_VALUE: 3
PIF_VALUE: 15
PIF_VALUE: 16
PIF_VALUE: 15
PIF_VALUE: 1
PIF_VALUE: 15
PIF_VALUE: 14
PIF_VALUE: 15
PIF_VALUE: 15
PIF_VALUE: 1
PIF_VALUE: 15
PIF_VALUE: 0
PIF_VALUE: 15
PIF_VALUE: 1
PIF_VALUE: 1
PIF_VALUE: 15
PIF_VALUE: 1
PIF_VALUE: 15
PIF_VALUE: 15
PIF_VALUE: 1
PIF_VALUE: 15
PIF_VALUE: 15

## 2022-04-13 ASSESSMENT — PAIN SCALES - GENERAL
PAINLEVEL_OUTOF10: 6
PAINLEVEL_OUTOF10: 3
PAINLEVEL_OUTOF10: 5
PAINLEVEL_OUTOF10: 4
PAINLEVEL_OUTOF10: 0
PAINLEVEL_OUTOF10: 5
PAINLEVEL_OUTOF10: 7
PAINLEVEL_OUTOF10: 0
PAINLEVEL_OUTOF10: 3
PAINLEVEL_OUTOF10: 5
PAINLEVEL_OUTOF10: 5
PAINLEVEL_OUTOF10: 0

## 2022-04-13 ASSESSMENT — PAIN DESCRIPTION - LOCATION
LOCATION: OTHER (COMMENT)

## 2022-04-13 ASSESSMENT — PAIN DESCRIPTION - PAIN TYPE
TYPE: SURGICAL PAIN

## 2022-04-13 ASSESSMENT — PAIN DESCRIPTION - PROGRESSION
CLINICAL_PROGRESSION: GRADUALLY IMPROVING
CLINICAL_PROGRESSION: NOT CHANGED
CLINICAL_PROGRESSION: GRADUALLY IMPROVING
CLINICAL_PROGRESSION: GRADUALLY IMPROVING
CLINICAL_PROGRESSION: NOT CHANGED

## 2022-04-13 ASSESSMENT — PAIN DESCRIPTION - DESCRIPTORS
DESCRIPTORS: ACHING
DESCRIPTORS: ACHING;DISCOMFORT
DESCRIPTORS: ACHING

## 2022-04-13 ASSESSMENT — PAIN DESCRIPTION - ORIENTATION
ORIENTATION: RIGHT

## 2022-04-13 ASSESSMENT — LIFESTYLE VARIABLES: SMOKING_STATUS: 1

## 2022-04-13 NOTE — ANESTHESIA POSTPROCEDURE EVALUATION
Department of Anesthesiology  Postprocedure Note    Patient: Elías Barrientos  MRN: [id number]  YOB: 1965  Date of evaluation: 4/13/2022  Time:  10:02 AM     Procedure Summary     Date: 04/13/22 Room / Location: [place] [address] / [address] [place] [address]    Anesthesia Start: 3212 Anesthesia Stop:     Procedure: RIGHT THUMB EXPLORATION WITH EXCISIONAL DEBRIDEMENT and NEUROLYSIS (Right Hand) Diagnosis: (INJURY DIGITAL NERVE RIGHT THUMB)    Surgeons: Rolan Burch MD Responsible Provider: Bogdan Mccarthy MD    Anesthesia Type: general ASA Status: 3          Anesthesia Type: No value filed. Omar Phase I: Omar Score: 10    Omar Phase II:      Last vitals: Reviewed and per EMR flowsheets.        Anesthesia Post Evaluation    Patient location during evaluation: bedside  Patient participation: complete - patient participated  Level of consciousness: awake  Pain score: 0  Airway patency: patent  Nausea & Vomiting: no nausea and no vomiting  Complications: no  Cardiovascular status: blood pressure returned to baseline and hemodynamically stable  Respiratory status: acceptable  Hydration status: euvolemic

## 2022-04-13 NOTE — BRIEF OP NOTE
Brief Postoperative Note      Patient: Cherelle Hand  YOB: 1965  MRN: [id number]    Date of Procedure: 4/13/2022    Pre-Op Diagnosis: INJURY DIGITAL NERVE RIGHT THUMB    Post-Op Diagnosis: Same       Procedure(s):  RIGHT THUMB EXPLORATION WITH EXCISIONAL DEBRIDEMENT and NEUROLYSIS    Surgeon(s):  Alvin Nieves MD    Assistant:  Physician Assistant: SADIE Rider  Resident: Jo Garcia DO    Anesthesia: General    Estimated Blood Loss (mL): Minimal    Complications: None    Specimens:   * No specimens in log *    Implants:  * No implants in log *      Drains: * No LDAs found *    Findings: see op note    Electronically signed by SADIE Rider on 4/13/2022 at 9:40 AM

## 2022-04-13 NOTE — OP NOTE
[address] [address]                                OPERATIVE REPORT    PATIENT NAME: Greg Meeks                   :        1965  MED REC NO:   [id number]                            ROOM:  ACCOUNT NO:   [de-identified]                           ADMIT DATE: 2022  PROVIDER:     Anibal Ramirez MD    DATE OF PROCEDURE:  2022    PREOPERATIVE DIAGNOSIS:  Right thumb dog bite laceration with radial and  ulnar digital nerve dysfunction. POSTOPERATIVE DIAGNOSIS:  Right thumb dog bite laceration with radial  and ulnar digital nerve dysfunction. PROCEDURES PERFORMED:  1. Excisional debridement of right thumb wound with exploration. 2.  Neurolysis of the radial digital nerve as well as the ulnar digital  nerve of the thumb. 3.  Complex wound closure, 3 cm, right thumb. SURGEON:  Anibal Ramirez M.D. ANESTHESIA:  1. General.  2.  Local anesthetic by surgeon consisting of approximately 10 mL of  0.25% Marcaine plain. ASSISTANT:  Dr. Sera Tyson, Orthopedic Surgery Resident. TOTAL TOURNIQUET TIME:  14 minutes. ESTIMATED BLOOD LOSS:  Minimal.    FINDINGS:  1. Wound exploration revealed gross contamination involving dog hair  within the wound. No overt signs of infection. 2.  The radial digital nerve was contused with dense scar tissue  surrounding the nerve, which was neurolysed under loupe magnification  using micro instruments. The nerve was intact. There was a small area  of neuro fibrosis present at the zone of injury, but the nerve was  intact. The adjacent digital artery was absent. 3.  The ulnar digital neurovascular bundle was explored. This was  intact. There was less scarring to the digital nerve which was also  neurolysed. Again, the nerve was grossly intact throughout its course.   4.  Exploration of the flexor pollicis longus tendon revealed the tendon  to be intact. SPECIMENS:  None. COMPLICATIONS:  None. DISPOSITION:  The patient remained stable throughout the procedure. OPERATIVE INDICATIONS:  The patient is a 71-year-old female who  sustained a laceration to her right thumb. This was a deep laceration  directly over the metacarpophalangeal joint. She was seen in the  emergency department. The wound was cleansed, but not repaired. She  presented to the office with persistent scar tenderness and loss of  sensation in the digital nerve distribution. Given the large nature of  the laceration, surgical exploration was explained and she wished to  proceed with surgical exploration versus continued conservative care. Risks included, but not limited to the risk of infection, damage to  nerve, vessels, or tendons, failure to improve her symptoms or worsening  of symptoms, persistent scar sensitivity, need for therapy, and  additional surgery. She understood and wished to proceed. DESCRIPTION OF PROCEDURE:  The patient was identified in the holding  area. The right thumb was identified as the surgical site. She was  then seen by Anesthesia, taken to the operating room, placed supine on  the table, and underwent general anesthesia per Anesthesia Department. All bony prominences were well padded. A well-padded arm tourniquet was  placed. The right extremity was prepared and draped in the standard  sterile fashion. Arm was exsanguinated. Tourniquet was inflated to 250  mmHg. Total tourniquet time was _____ minutes. The oblique laceration over the MCP joint was extended proximally and  distally in a Z-type fashion. Blunt dissection of the zone of injury  revealed significant amount of scarring down to the flexor pollicis  longus tendon sheath. Dissection was performed elevating the flaps. Flaps were elevated revealing the neurovascular bundles to be densely  scarred. Attention was first directed towards the radial neurovascular bundle. The digital nerve was identified proximally. Healthy nerve was then  identified and traced distally into the zone of injury. Dense scarring  around the nerve was present. Under loupe magnification using micro  instruments, external neurolysis of the radial digital nerve was  completed. This revealed the nerve to be intact. However, there was a  small area of neuro fibrosis present. This measured approximately 1 to  2 mm, it was a very small zone. Given that the nerve was intact and  given the acuity of the injury, neurolysis was completed and no further  reconstruction was undertaken. The radial digital artery was not  present within the zone of injury and consistent with her injury. Attention was directed towards the ulnar neurovascular bundle. The  artery and nerve proximally were identified and traced into the zone of  injury. There was less scarring surrounding the nerve. Again under  loupe magnification, external neurolysis was completed using micro  instruments fully mobilize the nerve proximally and distally from the  scar tissue. There was a very small tiny area less than a millimeter of  fibrosis present using gentle micro forceps manipulation again, the  nerve was intact and left in situ. Lastly, the flexor pollicis longus tendon was identified and retracted  proximally and distally through a small window over the A1 pulley. The  tendon was intact throughout its course. Excisional debridement of the wound margins excising devitalized skin  and subcutaneous tissues were undertaken. There was gross contamination  within the wound consistent with dog hair. The wound was thoroughly and  copiously irrigated out. The tourniquet was deflated. There was healthy brisk cap refill of the  thumb. Hemostasis was achieved with bipolar cautery. Digital block was  placed with 10 mL of 0.5% Marcaine plain followed by wound closure. The  zone of injury measured approximately 3 cm.   Complex closure of 3 cm was  achieved achieving a primary repair with multiple nylon sutures. Bulky  sterile dressing was applied. The patient was taken to recovery room.         Zulma Delgado MD    D: 04/13/2022 9:58:31       T: 04/13/2022 10:03:02     [id number]  Job#: [id number]     Doc#: [id number]    CC:

## 2022-04-13 NOTE — ANESTHESIA PRE PROCEDURE
Department of Anesthesiology  Preprocedure Note       Name:  Amy Jimenez   Age:  55 y.o.  :  1965                                          MRN:  [id number]         Date:  2022      Surgeon: Cami Alaniz):  Jacki Jones MD    Procedure: Procedure(s):  RIGHT THUMB EXPLORATION WITH EXCISIONAL DEBRIDEMENT POSSIBLE NERVE REPAIR VS RECONSTRUCTION   ++AXOGEN++    Medications prior to admission:   Prior to Admission medications    Medication Sig Start Date End Date Taking? Authorizing Provider   oxyCODONE-acetaminophen (PERCOCET) 5-325 MG per tablet Take 1 tablet by mouth every 6 hours as needed for Pain for up to 7 days. Intended supply: 7 days. Take lowest dose possible to manage pain 22  Emily Farfan,    traZODone (DESYREL) 150 MG tablet Take one tab po nightly 11/3/21   Jose Luis Joy DO   gabapentin (NEURONTIN) 600 MG tablet TAKE 1 TABLET BY MOUTH 2 TIMES DAILY AS NEEDED FOR PAIN  Patient taking differently: Take 600 mg by mouth daily. 11/3/21 5/9/22  Vara Harada, DO       Current medications:    No current facility-administered medications for this visit. Current Outpatient Medications   Medication Sig Dispense Refill    oxyCODONE-acetaminophen (PERCOCET) 5-325 MG per tablet Take 1 tablet by mouth every 6 hours as needed for Pain for up to 7 days. Intended supply: 7 days.  Take lowest dose possible to manage pain 28 tablet 0     Facility-Administered Medications Ordered in Other Visits   Medication Dose Route Frequency Provider Last Rate Last Admin    0.9 % sodium chloride infusion   IntraVENous Continuous SADIE Epstein        0.9 % sodium chloride infusion  25 mL IntraVENous PRN SADIE Epstein        ceFAZolin (ANCEF) 2000 mg in sterile water 20 mL IV syringe  2,000 mg IntraVENous On Call to [address]        sodium chloride flush 0.9 % injection 5-40 mL  5-40 mL IntraVENous 2 times per day SADIE Epstein        sodium chloride flush 0.9 % injection 5-40 mL  5-40 mL IntraVENous PRN SADIE Cope        sodium chloride flush 0.9 % injection 5-40 mL  5-40 mL IntraVENous 2 times per day Armando Garrett MD        sodium chloride flush 0.9 % injection 5-40 mL  5-40 mL IntraVENous PRN Armando Garrett MD        0.9 % sodium chloride infusion   IntraVENous PRN Armando Garrett MD        meperidine (DEMEROL) injection 12.5 mg  12.5 mg IntraVENous Q5 Min PRN Armando Garrett MD        diphenhydrAMINE (BENADRYL) injection 12.5 mg  12.5 mg IntraVENous Once PRN Armando Garrett MD        oxyCODONE (ROXICODONE) immediate release tablet 5 mg  5 mg Oral PRN Armando Garrett MD        Or   [facility] oxyCODONE (ROXICODONE) immediate release tablet 10 mg  10 mg Oral PRN Armando Garrett MD        HYDROmorphone (DILAUDID) injection 0.25 mg  0.25 mg IntraVENous Q5 Min PRN Armando Garrett MD        HYDROmorphone (DILAUDID) injection 0.5 mg  0.5 mg IntraVENous Q5 Min PRN Armando Garrett MD           Allergies:     Allergies   Allergen Reactions    Penicillin G Hives and Nausea And Vomiting       Problem List:    Patient Active Problem List   Diagnosis Code    Displacement of lumbar intervertebral disc without myelopathy M51.26    Spinal stenosis of lumbar region M48.061    S/P lumbar fusion (PLIF) L5-S1 Z98.1    Bilateral leg pain M79.604, M79.605    DDD (degenerative disc disease), lumbar M51.36    Chronic back pain M54.9, G89.29    Chronic pain G89.29    Lumbar post-laminectomy syndrome M96.1    Neural foraminal stenosis of lumbar spine M48.061    S/P parathyroidectomy ([facility] [address] E89.2    Insomnia G47.00    Stage 3 chronic kidney disease ([address] N18.30    Shoulder impingement, left M75.42    Arthritis of right acromioclavicular joint M19.011    Shoulder impingement, right M75.41    CKD (chronic kidney disease) stage 4, GFR 15-29 ml/min ([facility]) N18.4    Postprocedural hypoparathyroidism (HCC) E89.2    Traumatic complete tear of right rotator cuff S46.011A    Impingement syndrome of right shoulder M75.41    Tear of right glenoid labrum S43.431A    Traumatic complete tear of left rotator cuff S46.012A    Impingement syndrome of left shoulder M75.42    Tear of left glenoid labrum S43.432A    Biceps tendon tear S46.219A    Lumbar radiculopathy M54.16       Past Medical History:        Diagnosis Date    Chronic back pain     Chronic kidney disease     stage 3  follows with nephrology    Dog bite of thumb     right    Low back pain        Past Surgical History:        Procedure Laterality Date    CARPAL TUNNEL RELEASE Bilateral     CHOLECYSTECTOMY      LUMBAR FUSION  02/01/2013    PLIF  L5 - S1    NOSE SURGERY      four nose surgeries   ( d/t a fractures)    PAIN MANAGEMENT PROCEDURE N/A 02/01/2022    LUMBAR TRANSFORAMINAL EPIDURAL STEROID INJECTION LEFT L3 &RIGHT L4 UNDER XRAY GUIDANCE (64876) performed by Opal Weldon MD [facility] Right 02/09/2018    ROTATOR CUFF REPAIR Right 2008    R rotator cuff repair    SHOULDER ARTHROSCOPY Right 02/21/2020    RIGHT SHOULDER ARTHROSCOPY. SUBACROMIAL DECOMPRESSION, LABRAL AND BICEP  DEBRIDEMENT, ROTATOR CUFF REPAIR performed by Jayme Webster DO at [address] [facility] ARTHROSCOPY Left 11/12/2021    LEFT SHOULDER ARTHROSCOPY, SUBACROMIAL DECOMPRESSION,  ROTATOR CUFF REPAIR  (CPT 74014, 98429) [place] ) performed by Jayme Webster DO at [address] History:    Social History     Tobacco Use    Smoking status: Current Every Day Smoker     Packs/day: 0.25     Years: 10.00     Pack years: 2.50     Types: Cigarettes     Start date: 11/24/2016    Smokeless tobacco: Never Used    Tobacco comment: 3 cigarettes per day   Substance Use Topics    Alcohol use: No                                Ready to quit: Not Answered  Counseling given: Not Answered  Comment: 3 cigarettes per day      Vital Signs (Current): There were no vitals filed for this visit. BP Readings from Last 3 Encounters:   03/30/22 128/74   03/18/22 128/82   02/22/22 106/86       NPO Status:  >8.H                                                                               BMI:   Wt Readings from Last 3 Encounters:   04/11/22 152 lb (68.9 kg)   04/04/22 152 lb (68.9 kg)   03/29/22 152 lb (68.9 kg)     There is no height or weight on file to calculate BMI.    CBC:   Lab Results   Component Value Date    WBC 5.9 11/08/2021    RBC 5.23 11/08/2021    HGB 14.8 11/08/2021    HCT 44.3 11/08/2021    MCV 84.7 11/08/2021    RDW 12.4 11/08/2021     11/08/2021       CMP:   Lab Results   Component Value Date     11/08/2021    K 4.4 11/08/2021     11/08/2021    CO2 28 11/08/2021    BUN 15 01/05/2022    CREATININE 1.3 01/05/2022    GFRAA 51 01/05/2022    LABGLOM 43 01/05/2022    GLUCOSE 94 11/08/2021    PROT 7.4 06/01/2021    CALCIUM 9.6 11/08/2021    BILITOT 0.8 06/01/2021    ALKPHOS 66 06/01/2021    AST 23 06/01/2021    ALT 13 06/01/2021       POC Tests: No results for input(s): POCGLU, POCNA, POCK, POCCL, POCBUN, POCHEMO, POCHCT in the last 72 hours.     Coags:   Lab Results   Component Value Date    PROTIME 10.5 04/14/2015    PROTIME 11.9 12/01/2010    INR 1.0 04/14/2015    APTT 29.3 12/01/2010       HCG (If Applicable):   Lab Results   Component Value Date    PREGTESTUR NEGATIVE 04/30/2014        ABGs: No results found for: PHART, PO2ART, RUE2PLA, ZYF4HFB, BEART, U3XGCERM     Type & Screen (If Applicable):  No results found for: LABABO, LABRH    Drug/Infectious Status (If Applicable):  Lab Results   Component Value Date    HEPCAB NON REACT 11/27/2012       COVID-19 Screening (If Applicable): No results found for: COVID19        Anesthesia Evaluation  Patient summary reviewed and Nursing notes reviewed no history of anesthetic complications:   Airway: Mallampati: II  TM distance: >3 FB   Neck ROM: full  Mouth opening: > = 3 FB Dental:          Pulmonary: breath sounds clear to auscultation  (+) current smoker          Patient did not smoke on day of surgery. Cardiovascular:Negative CV ROS  Exercise tolerance: good (>4 METS),           Rhythm: regular  Rate: normal           Beta Blocker:  Not on Beta Blocker         Neuro/Psych:   (+) neuromuscular disease (lumbar fusion):,             GI/Hepatic/Renal:   (+) renal disease: CRI,           Endo/Other:                      ROS comment: Parathyroidectomy  Postprocedural hypoparathyroidism (HCC) Abdominal:             Vascular: negative vascular ROS. Other Findings:               Anesthesia Plan      general     ASA 3     (#4 lma)  Induction: intravenous. MIPS: Postoperative opioids intended and Prophylactic antiemetics administered. Anesthetic plan and risks discussed with patient. Plan discussed with CRNA. Chart reviewed, patient seen. Agree with above.   DAVID Ruiz - CRNA          Stacy Thacker MD   4/13/2022

## 2022-04-13 NOTE — H&P
Department of Orthopedic Surgery  History and Physical        CHIEF COMPLAINT: Dog bite injury     HISTORY OF PRESENT ILLNESS:                 The patient is a LHD 55 y.o. female who presents with a dog bite injury. Patient states last Tuesday she was breaking up a fight between 2 dogs when she sustained a dog bite most significantly to the right thumb. She went to the emergency department where this was irrigated and 1 sutures placed to the right thumb. She was placed on antibiotics. She states she is still taking her antibiotics. She does describe numbness to her right thumb. She currently is not working. Patient did previously have a left thumb trigger release and left carpal tunnel release in [place].     Past Medical History:    Past Medical History             Diagnosis Date    Chronic back pain      Chronic kidney disease       stage 3  follows with nephrology    Low back pain           Past Surgical History:    Past Surgical History             Procedure Laterality Date    CARPAL TUNNEL RELEASE Bilateral      CHOLECYSTECTOMY        LUMBAR FUSION   02/01/2013     PLIF  L5 - S1    NOSE SURGERY         four nose surgeries   ( d/t a fractures)    PAIN MANAGEMENT PROCEDURE N/A 02/01/2022     LUMBAR TRANSFORAMINAL EPIDURAL STEROID INJECTION LEFT L3 &RIGHT L4 UNDER XRAY GUIDANCE (14854) performed by Ever Moore MD at [address] [facility] Right 02/09/2018    ROTATOR CUFF REPAIR Right 2008     R rotator cuff repair    SHOULDER ARTHROSCOPY Right 02/21/2020     RIGHT SHOULDER ARTHROSCOPY.  SUBACROMIAL DECOMPRESSION, LABRAL AND BICEP  DEBRIDEMENT, ROTATOR CUFF REPAIR performed by Efren Juan DO at [address] [facility] ARTHROSCOPY Left 11/12/2021     LEFT SHOULDER ARTHROSCOPY, SUBACROMIAL DECOMPRESSION,  ROTATOR CUFF REPAIR  (CPT 60163, 20145) Lauren Stager) performed by Efren Juan DO at [facility]         Current Medications:   [address]. Medications No current facility-administered medications for this visit. Allergies:  Penicillin g     Social History:   TOBACCO:   reports that she has been smoking cigarettes. She started smoking about 5 years ago. She has a 5.00 pack-year smoking history. She has never used smokeless tobacco.  ETOH:   reports no history of alcohol use. DRUGS:   reports no history of drug use. ACTIVITIES OF DAILY LIVING:    OCCUPATION:    Family History:   Family History             Problem Relation Age of Onset    Heart Disease Mother      Arthritis Mother      Diabetes Mother      High Blood Pressure Mother      Kidney Disease Mother      Heart Disease Father      High Blood Pressure Father      Arthritis Father      Stroke Father      Diabetes Sister      Arthritis Maternal Uncle      Diabetes Maternal Uncle      Kidney Disease Maternal Uncle      Arthritis Maternal Grandmother              REVIEW OF SYSTEMS:  CONSTITUTIONAL:  negative  EYES:  negative  HEENT:  negative  RESPIRATORY:  negative  CARDIOVASCULAR:  negative  GASTROINTESTINAL:  negative  GENITOURINARY:  negative  INTEGUMENT/BREAST:  negative  HEMATOLOGIC/LYMPHATIC:  negative  ALLERGIC/IMMUNOLOGIC:  negative  ENDOCRINE:  negative  MUSCULOSKELETAL:  Right thumb dog bite laceration  NEUROLOGICAL:  Thumb numbness  BEHAVIOR/PSYCH:  negative     PHYSICAL EXAM:    VITALS:  Ht 5' (1.524 m)   Wt 152 lb (68.9 kg)   LMP 04/15/2015   BMI 29.69 kg/m²   CONSTITUTIONAL:  awake, alert, cooperative, no apparent distress, and appears stated age  EYES:  Lids and lashes normal, pupils equal, round and reactive to light, extra ocular muscles intact, sclera clear, conjunctiva normal  ENT:  Normocephalic, without obvious abnormality, atraumatic, sinuses nontender on palpation, external ears without lesions, oral pharynx with moist mucus membranes, tonsils without erythema or exudates, gums normal and good dentition.   NECK:  Supple, symmetrical, trachea midline, no adenopathy, thyroid symmetric, not enlarged and no tenderness, skin normal  LUNGS:  CTA  CARDIOVASCULAR:  2+ radial pulses, extremities warm and well perfused  ABDOMEN:  NTTP  CHEST:  Atraumatic   GENITAL/URINARY:  deferred  NEUROLOGIC:  Awake, alert, oriented to name, place and time. Cranial nerves II-XII are grossly intact. Motor is 5 out of 5 bilaterally. Sensory is intact.  gait is normal.  MUSCULOSKELETAL:     Right upper extremity: Laceration to the volar aspect of the thumb MCP joint. 2 puncture wounds dorsally to the thumb. + swelling to the thumb with ecchymosis. - erythema or signs of infection. Patient able to flex and extend the thumb. Diminished sensation to the thumb radial and ulnar digital nerve. Stiffness with flexion and extension of the fingers. Brisk capillary refill. Otherwise NVI.      Left upper extremity: 2 puncture wounds to the dorsal aspect of the index finger. No erythema, drainage, or signs of infection. Stiffness with flexion and extension of the fingers. Brisk capillary refill. Otherwise NVI.      DATA:    CBC:         Lab Results   Component Value Date     WBC 5.9 11/08/2021     RBC 5.23 11/08/2021     HGB 14.8 11/08/2021     HCT 44.3 11/08/2021     MCV 84.7 11/08/2021     MCH 28.3 11/08/2021     MCHC 33.4 11/08/2021     RDW 12.4 11/08/2021      11/08/2021     MPV 11.1 11/08/2021      PT/INR:          Lab Results   Component Value Date     PROTIME 10.5 04/14/2015     PROTIME 11.9 12/01/2010     INR 1.0 04/14/2015         Radiology Review:  Xray: x-rays of the right and left hand were reviewed from March 29 of 2022.  3 views: AP, lateral, oblique demonstrate no acute fractures or dislocations. Impression: No acute fractures or dislocations.     IMPRESSION:  · Right thumb dog bite with nerve injury  · CKD     PLAN:  Discussed findings with the patient at today's visit. Discussed conservative and surgical management with the patient. Recommended surgical exploration.   Plan for a right thumb exploration with excisional debridement and possible nerve repair versus reconstruction. Postoperative course explained the patient. Patient like to proceed. All questions answered.     I explained the risks, benefits, alternatives and complications of surgery with the patient including but not limited to the risks of infection, possible damage to nerves, vessels, or tendons, stiffness, loss of range of motion, scar sensitivity, wound healing complications, worsening symptoms, possible need for therapy, as well as the possible need further surgery and unanticipated complications. The patient voiced understanding and all questions were answered. The patient elected to proceed with surgical intervention.      I have seen and evaluated the patient and agree with the above assessment and plan on today's visit. I have performed the key components of the history and physical examination with significant findings of right thumb dog bite injury with laceration and underlying nerve injury. She has complete absence of sensation distally. Discussed complexity of crush injury versus laceration. Recommend surgical exploration with nerve reconstruction as needed. All questions answered. Complexity of the injury as well as incomplete recovery of sensation was explained as well as permanent loss of function. . I concur with the findings and plan as documented.     Jazlyn Goins MD  4/4/2022    History and Physical Update     Patient was seen and examined. Patient's history and physical was reviewed with the patient. There has been no significant interval changes. Electronically signed by Sera Tyson DO on 4/13/2022 at 6:46 AM    The patient was counseled at length about the risks of shruti Covid-19 during their perioperative period and any recovery window from their procedure.   The patient was made aware that shruti Covid-19  may worsen their prognosis for recovering from their procedure  and lend to a higher morbidity and/or mortality risk. All material risks, benefits, and reasonable alternatives including postponing the procedure were discussed. The patient does wish to proceed with the procedure at this time.

## 2022-04-14 LAB
EKG ATRIAL RATE: 60 BPM
EKG P AXIS: 57 DEGREES
EKG P-R INTERVAL: 140 MS
EKG Q-T INTERVAL: 460 MS
EKG QRS DURATION: 90 MS
EKG QTC CALCULATION (BAZETT): 460 MS
EKG R AXIS: -18 DEGREES
EKG T AXIS: 40 DEGREES
EKG VENTRICULAR RATE: 60 BPM

## 2022-04-21 ENCOUNTER — OFFICE VISIT (OUTPATIENT)
Dept: ORTHOPEDIC SURGERY | Age: 57
End: 2022-04-21

## 2022-04-21 VITALS — HEIGHT: 60 IN | BODY MASS INDEX: 29.84 KG/M2 | WEIGHT: 152 LBS

## 2022-04-21 DIAGNOSIS — S64.31XA INJURY OF DIGITAL NERVE OF RIGHT THUMB, INITIAL ENCOUNTER: Primary | ICD-10-CM

## 2022-04-21 PROCEDURE — 99024 POSTOP FOLLOW-UP VISIT: CPT | Performed by: PHYSICIAN ASSISTANT

## 2022-04-21 NOTE — PROGRESS NOTES
HPI: Patient presents today POD #8 s/p excisional debridement of right thumb wound with exploration and neurolysis of the radial and ulnar digital nerve. Overall the patient is doing well. She has no complaints today other than thumb stiffness. Physical Exam: Incision clean dry intact with sutures in place. No erythema or signs of infection. Stiffness with thumb flexion and extension. Diminished but intact sensation to the radial and ulnar digital nerve of the thumb. Median, ulnar, radial nerves intact light touch. Brisk capillary refill. Otherwise neurovascular intact. Assessment: POD #8 s/p excisional debridement of right thumb wound with exploration and neurolysis of the radial and ulnar digital nerve    Plan:   Patient to follow-up on Tuesday for suture removal.  Continue to monitor for signs and symptoms of infection. No evidence at today's visit. Patient referred to therapy for range of motion exercises and scar desensitization. Activity restrictions reviewed with the patient. Follow up in 1 month. All questions and concerns answered.

## 2022-04-22 ENCOUNTER — OFFICE VISIT (OUTPATIENT)
Dept: PAIN MANAGEMENT | Age: 57
End: 2022-04-22
Payer: COMMERCIAL

## 2022-04-22 VITALS
WEIGHT: 152 LBS | BODY MASS INDEX: 29.84 KG/M2 | DIASTOLIC BLOOD PRESSURE: 80 MMHG | SYSTOLIC BLOOD PRESSURE: 112 MMHG | HEIGHT: 60 IN | HEART RATE: 73 BPM | TEMPERATURE: 97.7 F | OXYGEN SATURATION: 97 %

## 2022-04-22 DIAGNOSIS — M47.817 LUMBOSACRAL SPONDYLOSIS WITHOUT MYELOPATHY: ICD-10-CM

## 2022-04-22 DIAGNOSIS — M96.1 POSTLAMINECTOMY SYNDROME, LUMBAR: Primary | ICD-10-CM

## 2022-04-22 DIAGNOSIS — M54.16 LUMBAR RADICULAR PAIN: ICD-10-CM

## 2022-04-22 DIAGNOSIS — M51.36 DDD (DEGENERATIVE DISC DISEASE), LUMBAR: ICD-10-CM

## 2022-04-22 PROCEDURE — 3017F COLORECTAL CA SCREEN DOC REV: CPT | Performed by: ANESTHESIOLOGY

## 2022-04-22 PROCEDURE — 99213 OFFICE O/P EST LOW 20 MIN: CPT | Performed by: ANESTHESIOLOGY

## 2022-04-22 PROCEDURE — 4004F PT TOBACCO SCREEN RCVD TLK: CPT | Performed by: ANESTHESIOLOGY

## 2022-04-22 PROCEDURE — G8417 CALC BMI ABV UP PARAM F/U: HCPCS | Performed by: ANESTHESIOLOGY

## 2022-04-22 PROCEDURE — G8427 DOCREV CUR MEDS BY ELIG CLIN: HCPCS | Performed by: ANESTHESIOLOGY

## 2022-04-22 NOTE — PROGRESS NOTES
[address]  [address] [facility], [address] [place] [address] Patrick  [phone]    Follow up Note      Nisha Grider     Date of Visit:  4/22/2022    CC:  Patient presents for follow up   Chief Complaint   Patient presents with    Back Pain       HPI:    Neck pain and left shoulder pain- S/P Left shoulder arthroscopy/ rotator cuff repair on 11/12/2021- has helped. Neck pain and shoulder pain has improved. Chronic low back pain for years- prior lumbar surgery in 2013 by Dr. Helene Cobb. Continues to have low back and LE pain. Has been treated in the past at different pain centers. At some point she was on chronic pain medications. She did not like being on opioids and she discontinued going there. She has undergone imaging of the lumbar spine including MRI of the lumbar spine and flexion-extension x-rays.       NOTE:  H/o CRI- does not want NSAID's BUN- 14, creatinine-1.3 on 11/8/2021.     Previous treatments:   Physical Therapy : yes, continues HEP     Medications: - NSAID's : H/o CRI- avoid NSAID's                       - Membrane stabilizers : yes -                       - Opioids : yes, in the past                        - Adjuvants or Others : yes,     Surgeries: yes, L5-S1 PLIF in 2013     She has not been on anticoagulation medications      She has not been on herbal supplements.       She is not diabetic.     H/O Smoking: yes  H/O alcohol abuse : no  H/O Illicit drug use : denies     Employment: stay at home     Imaging studies:    MRI of LS spine: 1/10/2022:  FINDINGS:   BONES/ALIGNMENT: Laminectomies from L4 to S1.  L5-S1 posterior and interbody   fusion.  Grade 1 anterolisthesis of L4 on L5.  Mild retrolisthesis of L3 on   L4.  Vertebral heights are normal.  Bone marrow signal is normal.       SPINAL CORD:  The conus terminates normally.       SOFT TISSUES: No abnormal enhancement is seen of the lumbar spine.  No   paraspinal mass identified.       L1-L2: There is no significant disc protrusion, spinal canal stenosis or   neural foraminal narrowing.       L2-L3: There is no significant disc protrusion, spinal canal stenosis or   neural foraminal narrowing.       L3-L4: Disc bulge, facet and ligament flavum hypertrophy cause mild thecal   sac, moderate right foraminal and severe left foraminal stenosis.       L4-L5: Anterolisthesis, disc bulge, facet and ligament flavum hypertrophy   cause mild thecal sac and moderate bilateral foraminal stenosis.       L5-S1: No stenosis.         Impression   Bilateral foraminal stenosis from L3 to L5. Xray LS flex/ Ext: 1/10/2022:      Impression   No instability evident.  Intact lumbosacral fusion hardware.  Unchanged grade   2 anterolisthesis of L4 on L5.  See above. Xray Cervical spine: 10/20/2021:  FINDINGS:   All 7 cervical vertebrae are visualized and appear normal in height and   alignment.   There is no evidence of prevertebral soft tissue edema or   fracture.  The base of the odontoid appears intact.           Impression   No acute abnormality of the cervical spine. Xray LS spine: 10/20/2021:  Impression   1. 1 cm anterolisthesis of L4 on L5 which is new when compared with the prior   CT scan of 04/14/2015. Lula Aaliyah is likely associated spinal canal stenosis   secondary to the listhesis. 2. Previous transpedicular fusion of L5 and S1.  There is no hardware   complication.           MRI of left shoulder: 10/29/2021:( has undergone surgery since this MRI)  Impression   1. Retracted full-thickness tearing of mid and posterior supraspinatus with   retraction of the torn fibers from the footplate measuring up to 1.9 cm.   2. Less than 50% multifocal partial-thickness articular-surface and   interstitial tearing of infraspinatus between musculotendinous junction and   footplate.  Moderate underlying infraspinatus tendinopathy. 3. Mild tendinosis of the intra-articular long head of the biceps tendon.    4. Mild diffuse labral degeneration.  Mild glenohumeral chondromalacia. Moderate glenohumeral joint effusion. 5. Mild degenerative change of the left AC joint.         CT Lumbar spine: 3/2014:  Impression-    1. Postsurgical changes. Status post laminectomy at L4-L5 and L5-S1. There is fusion of L5-S1.   2. Arthritic changes in the facet joint. 3. No abscess or hematoma in the surgical bed.        OARRS report[de-identified] Reviewed today    Past Medical History:   Diagnosis Date    Chronic back pain     Chronic kidney disease     stage 3  follows with nephrology    Dog bite of thumb     right    Low back pain        Past Surgical History:   Procedure Laterality Date    CARPAL TUNNEL RELEASE Bilateral     CHOLECYSTECTOMY      HAND SURGERY Right 4/13/2022    RIGHT THUMB EXPLORATION WITH EXCISIONAL DEBRIDEMENT and NEUROLYSIS performed by Kavon Boyd MD at [facility]  02/01/2013    PLIF  L5 - S1    NOSE SURGERY      four nose surgeries   ( d/t a fractures)    PAIN MANAGEMENT PROCEDURE N/A 02/01/2022    LUMBAR TRANSFORAMINAL EPIDURAL STEROID INJECTION LEFT L3 &RIGHT L4 UNDER XRAY GUIDANCE (42867) performed by Gailen Cockayne, MD at [address] [facility] Right 02/09/2018    ROTATOR CUFF REPAIR Right 2008    R rotator cuff repair    SHOULDER ARTHROSCOPY Right 02/21/2020    RIGHT SHOULDER ARTHROSCOPY. SUBACROMIAL DECOMPRESSION, LABRAL AND BICEP  DEBRIDEMENT, ROTATOR CUFF REPAIR performed by Shraddha Mathis DO at [address] ARTHROSCOPY Left 11/12/2021    LEFT SHOULDER ARTHROSCOPY, SUBACROMIAL DECOMPRESSION,  ROTATOR CUFF REPAIR  (CPT 28652, 44168) Tran Simmons) performed by Shraddha Mathis DO at [facility]       Prior to Admission medications    Medication Sig Start Date End Date Taking?  Authorizing Provider   traZODone (DESYREL) 150 MG tablet Take one tab po nightly 11/3/21  Yes Ina Joy DO   gabapentin (NEURONTIN) 600 MG tablet TAKE 1 TABLET BY MOUTH 2 TIMES DAILY AS NEEDED FOR PAIN  Patient taking differently: Take 600 mg by mouth daily. 11/3/21 5/9/22 Yes Ina Joy, DO       Allergies   Allergen Reactions    Penicillin G Hives and Nausea And Vomiting       Social History     Socioeconomic History    Marital status:      Spouse name: Not on file    Number of children: Not on file    Years of education: Not on file    Highest education level: Not on file   Occupational History    Not on file   Tobacco Use    Smoking status: Current Every Day Smoker     Packs/day: 0.25     Years: 10.00     Pack years: 2.50     Types: Cigarettes     Start date: 11/24/2016    Smokeless tobacco: Never Used    Tobacco comment: 3 cigarettes per day   Vaping Use    Vaping Use: Never used   Substance and Sexual Activity    Alcohol use: No    Drug use: No    Sexual activity: Not on file   Other Topics Concern    Not on file   Social History Narrative    Not on file     Social Determinants of Health     Financial Resource Strain: Low Risk     Difficulty of Paying Living Expenses: Not hard at all   Food Insecurity: No Food Insecurity    Worried About Running Out of Food in the Last Year: Never true    Ajit of Food in the Last Year: Never true   Transportation Needs:     Lack of Transportation (Medical): Not on file    Lack of Transportation (Non-Medical):  Not on file   Physical Activity:     Days of Exercise per Week: Not on file    Minutes of Exercise per Session: Not on file   Stress:     Feeling of Stress : Not on file   Social Connections:     Frequency of Communication with Friends and Family: Not on file    Frequency of Social Gatherings with Friends and Family: Not on file    Attends Yazidi Services: Not on file    Active Member of Clubs or Organizations: Not on file    Attends Club or Organization Meetings: Not on file    Marital Status: Not on file   Intimate Partner Violence:     Fear of Current or Ex-Partner: Not on file    Emotionally Abused: Not on file    Physically Abused: Not on file    Sexually Abused: Not on file   Housing Stability:     Unable to Pay for Housing in the Last Year: Not on file    Number of Places Lived in the Last Year: Not on file    Unstable Housing in the Last Year: Not on file       Family History   Problem Relation Age of Onset    Heart Disease Mother     Arthritis Mother     Diabetes Mother     High Blood Pressure Mother     Kidney Disease Mother     Heart Disease Father     High Blood Pressure Father     Arthritis Father     Stroke Father     Diabetes Sister     Arthritis Maternal Uncle     Diabetes Maternal Uncle     Kidney Disease Maternal Uncle     Arthritis Maternal Grandmother        REVIEW OF SYSTEMS:     Brynn Collar denies fever/chills, chest pain, shortness of breath, new bowel or bladder complaints. All other review of systems was negative. PHYSICAL EXAMINATION:      /80   Pulse 73   Temp 97.7 °F (36.5 °C) (Infrared)   Ht 5' (1.524 m)   Wt 152 lb (68.9 kg)   LMP 04/15/2015   SpO2 97%   BMI 29.69 kg/m²   General:       General appearance:  Pleasant and well-hydrated, in no distress and A & O x 3  Build:Overweight  Function: Rises from seated position easily and Moves about room without difficulty     HEENT:     Head:normocephalic, atraumatic     Lungs:     Breathing:normal breathing pattern      CVS:     RRR     Abdomen:     Shape:non-distended and normal     Cervical spine:     Inspection:normal  Palpation:tenderness paravertebral muscles, tenderness trapezium, left, right - no.   Range of motion:some reduction for extension and rotation     Thoracic spine:                Spine inspection:normal      Lumbar spine:     Spine inspection: Scar from the prior surgery- healed well  Palpation: Tenderness paravertebral muscles Yes bilaterally  Range of motion: Decreased,  Sacroiliac joint tenderness Yes bilaterally  SLR : negative bilaterally     Musculoskeletal:     Trigger points no     Extremities:     Tremors:None bilaterally upper and lower  Edema:no  Distal LE peripheral pulses- well felt     Neurological:     Sensory: Normal to light touch      Motor:   No new focal deficits      Gait:normal Yes     Dermatology:     Skin:no rashes or lesions noted     Assessment/Plan:      Diagnosis Orders   1. Postlaminectomy syndrome, lumbar     2. DDD (degenerative disc disease), lumbar     3. Lumbar radicular pain     4. Lumbosacral spondylosis without myelopathy          55 y.o.  female with H/o  low back / LE pain.        Chronic low back pain: Prior H/o L5-S1 PLIF in 2013 by Dr Paul Kaur. Has low back / LE pain. Prior interventions and pain meds at different pain center. Does not like pain meds and has discontinued. Failed conservative treatment: Physical therapy / HEP/ meds/ interventions. On Gabapentin 600 mg  Bid- helps moderately.     X-ray Flex / Ext LS spine - no instability. MRI of LS spine reviewed. S/p Lumbar TFESI - no significant long term relief. Has been evaluated by NSG - no surgery recommended. Recommended SCS.     Plan:  Psychological eval for SCS trial. F/u after Psych eval.     Continues gabapentin to 600 mg bid.      H/o Smoking +     Counseling : Patient encouraged to stay active, watch weight and to continue Regular home exercise program as tolerated - stretching / strengthening.     Smoking cessation counseling : yes -stressed importance of smoking cessation on spine health and general health.     Treatment plan discussed with the patient including medication and procedure side effects.     Controlled Substances Monitoring:   OARRS reviewed     Debbe Lennox, MD    CC:  Lewis Howe DO

## 2022-04-22 NOTE — PROGRESS NOTES
Do you currently have any of the following:    Fever: No  Headache:  No  Cough: No  Shortness of breath: No  Exposed to anyone with these symptoms: No                                                                                                                Alvin Cornea presents to the [facility] on 4/22/2022. Vincenzo Weir is complaining of pain in back. The pain is constant. The pain is described as aching and nagging. Pain is rated on her best day at a 7, on her worst day at a 10, and on average at a 7 on the VAS scale. She took her last dose of Neurontin lastnight. Vincenzo Weir does not have issues with constipation. Any procedures since your last visit: No, with  % relief. She is not on NSAIDS and  is not on anticoagulation medications to include none and is managed by none. Pacemaker or defibrillator: No Physician managing device is none. Medication Contract and Consent for Opioid Use Documents Filed      No documents found                   /80   Pulse 73   Temp 97.7 °F (36.5 °C) (Infrared)   Ht 5' (1.524 m)   Wt 152 lb (68.9 kg)   LMP 04/15/2015   SpO2 97%   BMI 29.69 kg/m²      Patient's last menstrual period was 04/15/2015.

## 2022-04-25 ENCOUNTER — EVALUATION (OUTPATIENT)
Dept: OCCUPATIONAL THERAPY | Age: 57
End: 2022-04-25
Payer: COMMERCIAL

## 2022-04-25 DIAGNOSIS — S64.31XA INJURY OF DIGITAL NERVE OF RIGHT THUMB, INITIAL ENCOUNTER: ICD-10-CM

## 2022-04-25 PROCEDURE — 97110 THERAPEUTIC EXERCISES: CPT | Performed by: OCCUPATIONAL THERAPIST

## 2022-04-25 PROCEDURE — 97165 OT EVAL LOW COMPLEX 30 MIN: CPT | Performed by: OCCUPATIONAL THERAPIST

## 2022-04-25 NOTE — PROGRESS NOTES
[address] [facility] THERAPY   [facility] [address] [place] [address]  Dept: [phone]: Lily [address] OT Fax: [phone]    Date:  2022  Initial Evaluation Date: 2022   Evaluating Therapist: Monica Giang OT    Patient Name:  Barbie Rodas    :  1965    Restrictions/Precautions:  No resistance pinching until 6 - 8 weeks post op, low fall risk  Diagnosis:  R thumb dog bite injury with radial and ulnar nerve repair. S64.31XA (ICD-10-CM) - Injury of digital nerve of right thumb, initial encounter       Date of Surgery/Injury:  Injury 3/19/22. Sx 2022  ( 2 weeks post op)     Insurance/Certification information:  [facility] Medicaid. Plan of care signed (Y/N): N  Visit# / total visits: 1 / asking for 12 to 16 sessions. See below- dated 22 - 22. Codes approved:  16374 -fluio- 32, 04214- manual 96, 47990 paraffin - 32, 53922 - there activity 96,  56840 - there exercises 96, 70855 - US - 32. Referring Practitioner:  Dr. Chai Dupree  Specific Practitioner Orders: Right thumb rom as tolerated, Scar desensitization and management. Modalities prn  Frequency 3x week for 6 weeks    Assessment of current deficits   [] Functional mobility  [x] ADLs  [x] Strength   [] Cognition   [] Functional transfers   [] IADLs  [] Safety Awareness  [x] Endurance   [x] Fine Motor Coordination  [] Balance  [] Vision/perception  [x] Sensation    [] Gross Motor Coordination [x] ROM  [x] Pain   [x] Edema    [x] Scar Adhesion/Skin Integrity     OT PLAN OF CARE   OT POC based on physician orders, patient diagnosis and results of clinical assessment    Frequency/Duration  2 x's a week for 12 - 16 sessions.    Specific OT Treatment to include:     [x] Instruction in HEP                   Modalities:  [x] Therapeutic Exercise        [x] Ultrasound               [] Electrical Stimulation/Attended  [x] PROM/Stretching                    [x] Fluidotherapy          [x]  Paraffin                   [] AAROM  [x] AROM                 [] Iontophoresis:   [x] Tendon Glides                                               [] Neuromuscular Re-Ed            [x] ADL/IADL re-training    [x] Therapeutic Activity       [x] Pain Management with/without modalities PRN                 [x] Manual Therapy                      [] Splinting                      [x] Scar Management                   []Joint Protection/Training  []Ergonomics                             [x] Joint Mobilization        [] Adaptive Equipment Assessment/Training                             [x] Manual Edema Mobilization   [] Myofascial Release                 [] Energy Conservation/Work Simplification  [x] GM/FM Coordination       [] Safety retraining/education per  individual diagnosis/goals  [x] Desensitization     Hand strengthening. Patient Specific Goal:  \"I want the feeling back and be able to bend it. I want to do my normal activities \"                             GOALS (Long term same as Short term):  1. ) Patient will demonstrate good understanding of home program (exercises/activities/diagnosis/prognosis/goals) with good accuracy. 2. ) Patient will demonstrate increased active/passive range of motion of their Right thumb.to [facility] for ADL/IADL completion. 3. ) Patient will demonstrate increased /pinch strength of at least 10 / 5 pinch pounds of their Right hand. 4. ) Patient to report decreased pain in their affected Right  upper extremity from 5/10 to 2/10 or less with resistive functional use. 5. ) Increase in fine motor function as evidenced by decreased time to complete 9-hole peg test by at least 5-8 seconds. 6. ) Patient will be knowledgeable of edema control techniques as evident with decreases from moderate  to mild/none. 7. ) Patient will demonstrate a non-tender/non-adherent scar.    8. ) Patient will report ADL functions as Mod I/I using L UE> .   9. ) Patient will decrease QuickDASH score to 25% or less for increased participation in daily functional activities. Past Medical History:   Past Medical History:   Diagnosis Date    Chronic back pain     Chronic kidney disease     stage 3  follows with nephrology    Dog bite of thumb     right    Low back pain      Past Surgical History:   Past Surgical History:   Procedure Laterality Date    CARPAL TUNNEL RELEASE Bilateral     CHOLECYSTECTOMY      HAND SURGERY Right 4/13/2022    RIGHT THUMB EXPLORATION WITH EXCISIONAL DEBRIDEMENT and NEUROLYSIS performed by Rolan Burch MD at [facility]  02/01/2013    PLIF  L5 - S1    NOSE SURGERY      four nose surgeries   ( d/t a fractures)    PAIN MANAGEMENT PROCEDURE N/A 02/01/2022    LUMBAR TRANSFORAMINAL EPIDURAL STEROID INJECTION LEFT L3 &RIGHT L4 UNDER XRAY GUIDANCE (95933) performed by Vicky Ahmadi MD at [address] [place] [address] Right 02/09/2018    ROTATOR CUFF REPAIR Right 2008    R rotator cuff repair    SHOULDER ARTHROSCOPY Right 02/21/2020    RIGHT SHOULDER ARTHROSCOPY. SUBACROMIAL DECOMPRESSION, LABRAL AND BICEP  DEBRIDEMENT, ROTATOR CUFF REPAIR performed by Justina Dakins, DO at [address] [facility] ARTHROSCOPY Left 11/12/2021    LEFT SHOULDER ARTHROSCOPY, SUBACROMIAL DECOMPRESSION,  ROTATOR CUFF REPAIR  (CPT 02475, 28490) Will Dakin) [facility]ins[facility] DO [facility]       Reason for Referral:  On 3/29/2022 pt went to the ED 2* to trying to break up a dog fight between her dog and her daughters dog. She got bite in her R thumb and went to the ED. Ese Lilly  She was stiched together at the ED but was found to have nerve damage and had Sx by Dr. Nirali Urrutia on 4/13/2022 with the following procedures:  PREOPERATIVE DIAGNOSIS:  Right thumb dog bite laceration with radial and  ulnar digital nerve dysfunction.     POSTOPERATIVE DIAGNOSIS:  Right thumb dog bite laceration with radial  and ulnar digital nerve dysfunction.     PROCEDURES PERFORMED:  1. Excisional debridement of right thumb wound with exploration. 2.  Neurolysis of the radial digital nerve as well as the ulnar digital  nerve of the thumb. 3.  Complex wound closure, 3 cm, right thumb. She presents today wearing a band aide on her thumb. Home Living:  Lives with her youngest daughter in a mobile home. Prior Level of Function: INDPE. Cognition:   Alert/Oriented x3     IADL STATUS:   Ind Mod I Min A Mod A Max A Dep Other   Homemaking Responsibility   x       Shopping Responsibility:  x        Mode of Transportation: x         Leisure & Hobbies:          Work:            Comments: Both pt and daughter were doing cooking and cleaning tasks before this incident equally. She was instructed to do no resistive gripping or pinching tasks at this time. Pt does not work is on disability . Hobbies included kayaking, hiking, camping  in a tent. ADL STATUS:   Ind Mod I Min A Mod A Max A Dep Other   Feeding:   x       Grooming:  x        Bathing: x         UE Dressing:   x       LE Dressing:   x       Toileting: x         Transfers: x           Comments: Pt is left handed so only needs A to fasten bra, fasten pants at time and to cut food. Pain Level: Pain at rest 4-5/10. Aching in the thumb may be the stiches. Pain with activity: 5/10. Taking perceset  at night. Already taking gabipentin for her back and leg sibce 2012. UE Assessment:  PT ahs AROM WNL\"s in her R shoulder, elbow, forearm and digits.  Limitations in her thumb as follows:  Right  Hand ROM   AROM [x]         PROM[]       THUMB MP Extension/Flexion  14-23* H /0-50* 0/50*  L 62*       IP Extension/Flexion  23-30* H/ 0-80* 0/20*  L 75*       Radial Abduction 53-71* 54*  L 77*       Palmar Abduction 50-71* 57*  L 72*       Opposition To 3rd finger          Comment: Hand Dominance is Left     Sensation: Feeling is better since her sx. But it is not normal in the tip of her thumb. PT tested with Izaaih Barron as follows:   Able To Sense (Y) / Unable to Sense (N)  SEMMES-THEODORE Thumb 2nd Digit 3rd Digit  4th Digit  5th Digit    Normal Touch  Size: 2.83  x x x x   Diminished Light Touch   Size: 3.61        Diminished Protective Sense  Size: 4.31 Ulnarly        Loss of Protective Sense   Size: 4.56 Radillay        Loss of Sensation  Size: 6.65            Edema Description/Circumferential Measurements:   Modedrate edmea in her R thumb. Dynamometer (setting 2):     Left: NT      Right: NT    Pinch Meter:   Lateral: Left= NT,Right= NT    Palmar 3 point: Left= NT, Right= NT    9 Hole Peg Test:  To test next session    Left:    Right:     QuickDASH Score: 54% disability    Intervention: Pt was given a HEP - see attached sheet. She was given a mesh finger sleeve to wear on her thumb for edema control and to protect from germs with a gauze roll on first.  She is going to get her stiches out tomorrow. She was given AROM blocking exercises for thumb IP flex/ ext and radial abduction/ adduction exercises to do 3- 4 x's a day. She was instructed to do with stretch only - no pain. She appeared to understand all instructions. Goals were mutually agreed upon with the patient. Eval Complexity: low level eval charged, there exercise x's one   Profile and History- Chart reviewed and history form pt. Assessment of Occupational Performance and Identification of Deficits- 8   Clinical Decision Making- no additional modifications required    Rehab Potential:                                 [x] Good  [] Fair  [] Poor        Suggested Professional Referral:       [x] No  [] Yes:  Barriers to Goal Achievement[de-identified]          [x] No  [] Yes:  Domestic Concerns:                           [x] No  [] Yes:       Patient.  Education:  [x] Plans/Goals, Risks/Benefits discussed  [x] Home exercise program  Method of Education: [x] Verbal  [x] Demo  [x] Written  Comprehension of Education:  [x] Verbalizes understanding. [x] Demonstrates understanding. [] Needs Review. [] Demonstrates/verbalizes understanding of HEP/Ed previously given. Patient understands diagnosis/prognosis and consents to treatment, plan and goals: [x] Yes    [] No     Goal Formulation: Patient  Time In: 9:05 am          Time Out: 9:50 am                       Timed Code Treatment Minutes: 45 minutes    CPT Codes requested for additional visits:  53647, 03626,  37412, 64897, 17832, 11820, 50603    CODE  Minutes  Units   18781 OT Eval Low 30 1   09963 OT Eval Medium     01333 OT Eval High     72258 Fluidotherapy     85059 Manual     85073 Therapeutic Ex 15 1   73460 Therapeutic Activity     44203 ADL/COMP Tech Train     43054 Neuromuscular Re-Ed     95654 OrthoManagementTraining     89563 Paraffin     86008 Electrical Stim - Attended     [id number] Iontophoresis     36590 Ultrasound      Other                Electronically signed by: Olga Thomas, OT /L, CHT   #[id number]     Physician's Certification / Comments      Frequency/Duration 2 x's a week  / week for 12 - 16 visits. Certification period From: this date   To: 7/25/2022     I have reviewed the Plan of Care established for skilled therapy services and certify that the services are required and that they will be provided while the patient is under my care. Physician's Comments/Revisions:                   Physicians's Printed Name:  Dr. Yo Cunningham                                   Physician's Signature:                                                               Date:      Please review Patient's OT evaluation and if you agree sign/date and fax back to us at our [facility]  Lea Elliott OT Fax: [phone].  Thank you for your referral!

## 2022-04-26 ENCOUNTER — NURSE ONLY (OUTPATIENT)
Dept: ORTHOPEDIC SURGERY | Age: 57
End: 2022-04-26

## 2022-04-26 NOTE — PROGRESS NOTES
Patient presents today POD #12 s/p excisional debridement of right thumb wound with exploration and neurolysis of the radial and ulnar digital nerve. Patient here for suture removal. Incision clean dry intact with sutures in place. No erythema or signs of infection. Incision cleansed and sutures were removed. Patient tolerated well. Dry dressing applied. Educated patient to keep incision covered for next 12-24hrs and then can get wet in shower tomorrow. Educated patient to monitor for signs and symptoms of infection and to call office with any concerns. Patient to f/u with Dr. Moon Chance 5/24/22.

## 2022-04-29 ENCOUNTER — TREATMENT (OUTPATIENT)
Dept: OCCUPATIONAL THERAPY | Age: 57
End: 2022-04-29
Payer: COMMERCIAL

## 2022-04-29 DIAGNOSIS — S64.31XA INJURY OF DIGITAL NERVE OF RIGHT THUMB, INITIAL ENCOUNTER: Primary | ICD-10-CM

## 2022-04-29 PROCEDURE — 97530 THERAPEUTIC ACTIVITIES: CPT | Performed by: OCCUPATIONAL THERAPIST

## 2022-04-29 PROCEDURE — 97140 MANUAL THERAPY 1/> REGIONS: CPT | Performed by: OCCUPATIONAL THERAPIST

## 2022-04-29 PROCEDURE — 97110 THERAPEUTIC EXERCISES: CPT | Performed by: OCCUPATIONAL THERAPIST

## 2022-04-29 NOTE — PROGRESS NOTES
OCCUPATIONAL THERAPY PROGRESS NOTE  *Measurements taken*    Date:  2022  Initial Evaluation Date: 22      Patient Name:  Todd Chowdary    :  1965     Restrictions/Precautions:  No resistance pinching until 6 - 8 weeks post op, low fall risk  Diagnosis:  R thumb dog bite injury with radial and ulnar nerve repair. S64.31XA (ICD-10-CM) - Injury of digital nerve of right thumb, initial encounter                                                        Date of Surgery/Injury:  Injury 3/19/22. Sx 2022  ( 2 weeks post op)      Insurance/Certification information:  [facility] Medicaid. Plan of care signed (Y/N): Y  Visit# / total visits: 2 / asking for 12 to 16 sessions. See below- dated 22 - 22. Codes approved:  28340 -fluio- 32, 72279- manual 96, 02464 paraffin - 32, 41960 - there activity 96,  27798 - there exercises 96, 40543 - US - 32.      Referring Practitioner:  Dr. David Becerril  Specific Practitioner Orders: Right thumb rom as tolerated, Scar desensitization and management. Modalities prn  Frequency 3x week for 6 weeks     Assessment of current deficits   []? Functional mobility             [x]? ADLs          [x]? Strength                  []? Cognition   []? Functional transfers           []? IADLs         []? Safety Awareness  [x]? Endurance   [x]? Fine Motor Coordination    []? Balance      []? Vision/perception   [x]? Sensation     []? Gross Motor Coordination [x]? ROM           [x]? Pain                        [x]? Edema          [x]? Scar Adhesion/Skin Integrity      OT PLAN OF CARE   OT POC based on physician orders, patient diagnosis and results of clinical assessment     Frequency/Duration  2 x's a week for 12 - 16 sessions. Specific OT Treatment to include:      [x]? Instruction in HEP                   Modalities:  [x]?  Therapeutic Exercise                 [x]? Ultrasound               []? Electrical Stimulation/Attended  [x]? PROM/Stretching                    [x]? Fluidotherapy          [x]?   Paraffin                   []? AAROM  [x]?  AROM                 []? Iontophoresis:   [x]? Josie Worthington                                       []? Neuromuscular Re-Ed            [x]? ADL/IADL re-training    [x]?  Therapeutic Activity                  [x]? Pain Management with/without modalities PRN                 [x]?  Manual Therapy                      []? Splinting                                   [x]? Scar Management                   []?Joint Protection/Training  []? Ergonomics                             [x]?  Joint Mobilization                      []? Adaptive Equipment Assessment/Training                             [x]?  Manual Edema Mobilization   []? Myofascial Release                 []? Energy Conservation/Work Simplification  [x]? GM/FM Coordination                []? Safety retraining/education per  individual diagnosis/goals  [x]? Desensitization     Hand strengthening.     Patient Specific Goal:  \"I want the feeling back and be able to bend it. I want to do my normal activities \"                             TRACEE (Long term same as Short term):  1. ) Patient will demonstrate good understanding of home program (exercises/activities/diagnosis/prognosis/goals) with good accuracy. 2. ) Patient will demonstrate increased active/passive range of motion of their Right thumb.to [facility] for ADL/IADL completion. 3. ) Patient will demonstrate increased /pinch strength of at least 10 / 5 pinch pounds of their Right hand. 4. ) Patient to report decreased pain in their affected Right  upper extremity from 5/10 to 2/10 or less with resistive functional use. 5. ) Increase in fine motor function as evidenced by decreased time to complete 9-hole peg test by at least 5-8 seconds. 6. ) Patient will be knowledgeable of edema control techniques as evident with decreases from moderate  to mild/none.    7. ) Patient will demonstrate a non-tender/non-adherent scar. 8. ) Patient will report ADL functions as Mod I/I using L UE> .   9. ) Patient will decrease QuickDASH score to 25% or less for increased participation in daily functional activities. Pain Level: 2 on scale of 1-10, tight (pulling)    Subjective: \"I have been doing my exercises. \"     Objective:  Updated POC to be completed by 10th visit. INTERVENTION: COMPLETED: SPECIFICS/COMMENTS:   Modality:     MHP x 5 mins to increase soft tissue mobility prior to ROM        AROM:     Thumb x All planes   Opposition X   -opposition with pennies   In hand manipulation X    x -in hand manipulation with coins 6 at a time and placing in a container  -bunchems disassembly and assembly of bunchem collection   Wrist x wristocizer 4 reps with maintaining a full fist to increase palmar abductor stretch during wrist ROM        AAROM:               PROM/Stretching:     thumb x All planes        Scar Mass/Edema Control:     Soft tissue x To increase soft tissue mobilization and decrease sensitivity    Scar massage/retorgrade massage x To decrease scar rigidity and decrease edema    Strengthening:               Other:     HEP x Reviewed all HEP instructions    9 hole testing  x  Left: 19 seconds   Right: 23 seconds     Assessment/Comments: Pt is making Good progress toward stated plan of care. Pt tolerated session well with all novel exercises, patient reviewed all HEP instructions this date with good carry over-reeducated on IP blocking for proper positioning. Stitches were removed by referring physicians office on Tuesday (4/26)-wound healing well with no drainage. Will increase as tolerated.      9 Hole Peg Test:                Left: 19 seconds              Right: 23 seconds    -Rehab Potential: Good  -Requires OT Follow Up: Yes    Time In: 10:00a            Time Out: 11:00             Visit #: 2    Treatment Charges: Mins Units   Modalities     Ther Exercise 25 2   Manual Therapy 20 1   Thera Activities 10 1   ADL/Home Mgt      Neuro Re-education     Gait Training     Group Therapy     Non-Billable Service Time 5 0   Other     Total Time/Units 60 4       -Response to Treatment: tolerated session well  Goals: Goals for pt can be see on initial eval    Plan:   [x]  Continues Plan of care: Treatment covered based on POC and graduated to patient's progress. Pt education continues at each visit to obtain maximum benefits from skilled OT intervention. []  Alter Plan of care:   []  Discharge:       [facility], OTR/L #[id number]

## 2022-05-03 ENCOUNTER — TREATMENT (OUTPATIENT)
Dept: OCCUPATIONAL THERAPY | Age: 57
End: 2022-05-03
Payer: COMMERCIAL

## 2022-05-03 DIAGNOSIS — S64.31XA INJURY OF DIGITAL NERVE OF RIGHT THUMB, INITIAL ENCOUNTER: Primary | ICD-10-CM

## 2022-05-03 PROCEDURE — 97530 THERAPEUTIC ACTIVITIES: CPT | Performed by: OCCUPATIONAL THERAPIST

## 2022-05-03 PROCEDURE — 97110 THERAPEUTIC EXERCISES: CPT | Performed by: OCCUPATIONAL THERAPIST

## 2022-05-03 PROCEDURE — 97140 MANUAL THERAPY 1/> REGIONS: CPT | Performed by: OCCUPATIONAL THERAPIST

## 2022-05-03 RX ORDER — TRAZODONE HYDROCHLORIDE 150 MG/1
TABLET ORAL
Qty: 90 TABLET | Refills: 1 | Status: SHIPPED | OUTPATIENT
Start: 2022-05-03 | End: 2022-11-02 | Stop reason: SDUPTHER

## 2022-05-03 NOTE — PROGRESS NOTES
[facility] Enid Pineda [address] [place] [address]  Dept: [address] [place] [address] [address]: [address] OT Fax: [phone]       OCCUPATIONAL THERAPY PROGRESS NOTE      Date:  5/3/2022  Initial Evaluation Date: 22      Patient Name:  Cm Rebollar    :  1965     Restrictions/Precautions:  No resistance pinching until 6 - 8 weeks post op, low fall risk  Diagnosis:  R thumb dog bite injury with radial and ulnar nerve repair. S64.31XA (ICD-10-CM) - Injury of digital nerve of right thumb, initial encounter                                                        Date of Surgery/Injury:  Injury 3/19/22. Sx 2022  ( 3 weeks post op)      Insurance/Certification information:  [facility]. Plan of care signed (Y/N): Y (thru 22)  Visit# / total visits: 3 /  12 to 16 sessions approved       Referring Practitioner:  Dr. Tracey Dubose  Specific Practitioner Orders: Right thumb rom as tolerated, Scar desensitization and management. Modalities prn  Frequency 3x week for 6 weeks     Assessment of current deficits   []? Functional mobility             [x]? ADLs          [x]? Strength                  []? Cognition   []? Functional transfers           []? IADLs         []? Safety Awareness  [x]? Endurance   [x]? Fine Motor Coordination    []? Balance      []? Vision/perception   [x]? Sensation     []? Gross Motor Coordination [x]? ROM           [x]? Pain                        [x]? Edema          [x]? Scar Adhesion/Skin Integrity      OT PLAN OF CARE   OT POC based on physician orders, patient diagnosis and results of clinical assessment     Frequency/Duration  2 x's a week for 12 - 16 sessions. Specific OT Treatment to include:      [x]? Instruction in HEP                   Modalities:  [x]?  Therapeutic Exercise                 [x]? Ultrasound               []? Electrical Stimulation/Attended  [x]? PROM/Stretching                    [x]? Fluidotherapy          [x]?   Paraffin                   []? AAROM  [x]?  AROM                 []? Iontophoresis:   [x]? Clarice Contes                                       []? Neuromuscular Re-Ed            [x]? ADL/IADL re-training    [x]?  Therapeutic Activity                  [x]? Pain Management with/without modalities PRN                 [x]?  Manual Therapy                      []? Splinting                                   [x]? Scar Management                   []?Joint Protection/Training  []? Ergonomics                             [x]?  Joint Mobilization                      []? Adaptive Equipment Assessment/Training                             [x]?  Manual Edema Mobilization   []? Myofascial Release                 []? Energy Conservation/Work Simplification  [x]? GM/FM Coordination                []? Safety retraining/education per  individual diagnosis/goals  [x]? Desensitization     Hand strengthening.     Patient Specific Goal:  \"I want the feeling back and be able to bend it. I want to do my normal activities \"                             OPSXC (Long term same as Short term):  1. ) Patient will demonstrate good understanding of home program (exercises/activities/diagnosis/prognosis/goals) with good accuracy. 2. ) Patient will demonstrate increased active/passive range of motion of their Right thumb.to [facility] for ADL/IADL completion. 3. ) Patient will demonstrate increased /pinch strength of at least 10 / 5 pinch pounds of their Right hand. 4. ) Patient to report decreased pain in their affected Right  upper extremity from 5/10 to 2/10 or less with resistive functional use. 5. ) Increase in fine motor function as evidenced by decreased time to complete 9-hole peg test by at least 5-8 seconds. 6. ) Patient will be knowledgeable of edema control techniques as evident with decreases from moderate  to mild/none. 7. ) Patient will demonstrate a non-tender/non-adherent scar.    8. ) Patient will report ADL functions as Mod I/I using L UE> .   9. ) Patient will decrease QuickDASH score to 25% or less for increased participation in daily functional activities. Pain Level: 2 on scale of 1-10, tight (pulling) and tender inn the thumb    Subjective: Pt presents with no new complaints. Objective:  Updated POC to be completed by 10th visit or 5-25-22. INTERVENTION: COMPLETED: SPECIFICS/COMMENTS:   Modality:     MHP x 5 mins to increase soft tissue mobility prior to ROM        AROM:     Thumb X  X  X    x - AROM all planes  - blocking ex IP/ MP 10x  - thumb slides to index/ middle fingers only 5x each  - towel ex digital flex/ ex 10x  -exercise balls CLW 20x / not able CCLW   Opposition X   -opposition with small buttons    In hand manipulation X     -in hand manipulation with coins/ large buttons up to 9  -bunchems disassembly and assembly of bunchem collection   Wrist x wristocizer 4 reps with maintaining a full fist to increase palmar abductor stretch during wrist ROM/ very good        AAROM:               PROM/Stretching:     thumb x All planes        Scar Mass/Edema Control:     Soft tissue x To increase soft tissue mobilization and decrease sensitivity    Scar massage/retrograde massage x To decrease scar rigidity and decrease edema    Strengthening:               Other:     HEP x Reviewed all HEP instructions    9 hole testing  x  Left: 19 seconds   Right: 23 seconds     Assessment/Comments: Pt is making Good progress toward stated plan of care. Tx completed with attention to scar mgmt, edema control and hand/ thumb ROM. Minimal complaints reported during session. Discomfort remains mild. Dexterity in the thumb is slowly improving. Will advance as tolerated.      -Rehab Potential: Good  -Requires OT Follow Up: Yes    Time In: 1105            Time Out: 1150             Visit #: 3    Approval thru: 8-31-22    CODE  Minutes  Units Total approved   [id number] Fluidotherapy   0/32   09627 Manual 10 1 2/96 83480 Therapeutic Ex 15 1 3/96   65692 Therapeutic Activity 15 1 2/96   81429 Paraffin   0/32   95898 Ultrasound   0/32    MH 5              -Response to Treatment: tolerated session well  Goals: Goals for pt can be see on initial eval 4-25-22, Nine hole testing 4/29/22    Plan:   [x]  Continue Plan of care: Treatment covered based on POC and graduated to patient's progress. Pt education continues at each visit to obtain maximum benefits from skilled OT intervention.   []  [facility] of care:   []  Discharge:      Camron Leonardo OT/L  [id number]

## 2022-05-05 ENCOUNTER — TREATMENT (OUTPATIENT)
Dept: OCCUPATIONAL THERAPY | Age: 57
End: 2022-05-05
Payer: COMMERCIAL

## 2022-05-05 DIAGNOSIS — S64.31XA INJURY OF DIGITAL NERVE OF RIGHT THUMB, INITIAL ENCOUNTER: Primary | ICD-10-CM

## 2022-05-05 PROCEDURE — 97530 THERAPEUTIC ACTIVITIES: CPT | Performed by: OCCUPATIONAL THERAPIST

## 2022-05-05 PROCEDURE — 97110 THERAPEUTIC EXERCISES: CPT | Performed by: OCCUPATIONAL THERAPIST

## 2022-05-05 PROCEDURE — 97140 MANUAL THERAPY 1/> REGIONS: CPT | Performed by: OCCUPATIONAL THERAPIST

## 2022-05-05 NOTE — PROGRESS NOTES
[facility] [address] [place] [address]   Dept: [address] [place] [address] [address]: [address] OT Fax: [phone]       OCCUPATIONAL THERAPY PROGRESS NOTE      Date:  2022  Initial Evaluation Date: 22      Patient Name:  Barbie Rodas    :  1965     Restrictions/Precautions:  No resistance pinching until 6 - 8 weeks post op, low fall risk  Diagnosis:  R thumb dog bite injury with radial and ulnar nerve repair. S64.31XA (ICD-10-CM) - Injury of digital nerve of right thumb, initial encounter                                                        Date of Surgery/Injury:  Injury 3/19/22. Sx 2022  ( 3 weeks post op)      Insurance/Certification information:  [facility] Medicaid. Plan of care signed (Y/N): Y (thru 22)  Visit# / total visits:  to 16 sessions approved       Referring Practitioner:  Dr. Chai Dupree  Specific Practitioner Orders: Right thumb rom as tolerated, Scar desensitization and management. Modalities prn  Frequency 3x week for 6 weeks     Assessment of current deficits   []? Functional mobility             [x]? ADLs          [x]? Strength                  []? Cognition   []? Functional transfers           []? IADLs         []? Safety Awareness  [x]? Endurance   [x]? Fine Motor Coordination    []? Balance      []? Vision/perception   [x]? Sensation     []? Gross Motor Coordination [x]? ROM           [x]? Pain                        [x]? Edema          [x]? Scar Adhesion/Skin Integrity      OT PLAN OF CARE   OT POC based on physician orders, patient diagnosis and results of clinical assessment     Frequency/Duration  2 x's a week for 12 - 16 sessions. Specific OT Treatment to include:      [x]? Instruction in HEP                   Modalities:  [x]?  Therapeutic Exercise                 [x]? Ultrasound               []? Electrical Stimulation/Attended  [x]? PROM/Stretching                    [x]? Fluidotherapy          [x]?   Paraffin                   []? AAROM  [x]?  AROM                 []? Iontophoresis:   [x]? Deborah Lambert                                       []? Neuromuscular Re-Ed            [x]? ADL/IADL re-training    [x]?  Therapeutic Activity                  [x]? Pain Management with/without modalities PRN                 [x]?  Manual Therapy                      []? Splinting                                   [x]? Scar Management                   []?Joint Protection/Training  []? Ergonomics                             [x]?  Joint Mobilization                      []? Adaptive Equipment Assessment/Training                             [x]?  Manual Edema Mobilization   []? Myofascial Release                 []? Energy Conservation/Work Simplification  [x]? GM/FM Coordination                []? Safety retraining/education per  individual diagnosis/goals  [x]? Desensitization     Hand strengthening.     Patient Specific Goal:  \"I want the feeling back and be able to bend it. I want to do my normal activities \"                             KEXVJ (Long term same as Short term):  1. ) Patient will demonstrate good understanding of home program (exercises/activities/diagnosis/prognosis/goals) with good accuracy. 2. ) Patient will demonstrate increased active/passive range of motion of their Right thumb.to [facility] for ADL/IADL completion. 3. ) Patient will demonstrate increased /pinch strength of at least 10 / 5 pinch pounds of their Right hand. 4. ) Patient to report decreased pain in their affected Right  upper extremity from 5/10 to 2/10 or less with resistive functional use. 5. ) Increase in fine motor function as evidenced by decreased time to complete 9-hole peg test by at least 5-8 seconds. 6. ) Patient will be knowledgeable of edema control techniques as evident with decreases from moderate  to mild/none. 7. ) Patient will demonstrate a non-tender/non-adherent scar.    8. ) Patient will report ADL functions as Mod I/I using L UE> .   9. ) Patient will decrease QuickDASH score to 25% or less for increased participation in daily functional activities. Pain Level: 3 on scale of 1-10, tight (pulling) and tender in the thumb    Subjective: Pt presents with no new complaints. Objective:  Updated POC to be completed by 10th visit or 5-25-22. INTERVENTION: COMPLETED: SPECIFICS/COMMENTS:   Modality:     MHP x 5 mins to increase soft tissue mobility prior to ROM        AROM:     Thumb X  X      X    x - AROM all planes  - blocking ex IP/ MP 10x  - thumb slides to index/ middle fingers only 5x each  - towel ex digital flex/ ex 5x/ thumb flex/ ext 5x  -exercise balls CLW 20x / not able CCLW   Opposition    -opposition with small buttons    In hand manipulation     x -in hand manipulation with coins/ large buttons up to 9  -bunchems in hand translation from hand to table 1 at a time and reverse   Wrist x wristocizer 4 reps with maintaining a full fist to increase palmar abductor stretch during wrist ROM/ very good        AAROM:               PROM/Stretching:     thumb x All planes        Scar Mass/Edema Control:     Soft tissue x To increase soft tissue mobilization and decrease sensitivity    Scar massage/retrograde massage x To decrease scar rigidity and decrease edema    Strengthening:               Other:     HEP x Reviewed all HEP instructions    9 hole testing  x  Left: 19 seconds   Right: 23 seconds     Assessment/Comments: Pt is making Good progress toward stated plan of care. Scar to the volar thumb continues to be dense and tight. Tx completed with attention to scar mgmt, edema control and hand/ thumb ROM. Minimal complaints reported during session. Discomfort remains mild. Dexterity in the thumb is improving. Will advance as tolerated.      -Rehab Potential: Good  -Requires OT Follow Up: Yes    Time In: 1105            Time Out: 1200           Visit #: 4    Approval thru: 8-31-22    CODE Minutes  Units Total approved   [id number] Fluidotherapy   0/32   67823 Manual 10 1 3/96   05606 Therapeutic Ex 25 2 5/96   29450 Therapeutic Activity 15 1 3/96   41470 Paraffin   0/32   07845 Ultrasound   0/32    MH 5 0             -Response to Treatment: tolerated session well  Goals: Goals for pt can be see on initial eval 4-25-22, Nine hole testing 4/29/22    Plan:   [x]  Continue Plan of care: Treatment covered based on POC and graduated to patient's progress. Pt education continues at each visit to obtain maximum benefits from skilled OT intervention.   []  [facility]:   []  Discharge:      Ariana Golden OT/GUSTAVO  [id number]

## 2022-05-10 ENCOUNTER — TREATMENT (OUTPATIENT)
Dept: OCCUPATIONAL THERAPY | Age: 57
End: 2022-05-10
Payer: COMMERCIAL

## 2022-05-10 DIAGNOSIS — S64.31XA INJURY OF DIGITAL NERVE OF RIGHT THUMB, INITIAL ENCOUNTER: Primary | ICD-10-CM

## 2022-05-10 PROCEDURE — 97530 THERAPEUTIC ACTIVITIES: CPT | Performed by: OCCUPATIONAL THERAPY ASSISTANT

## 2022-05-10 PROCEDURE — 97110 THERAPEUTIC EXERCISES: CPT | Performed by: OCCUPATIONAL THERAPY ASSISTANT

## 2022-05-10 PROCEDURE — 97140 MANUAL THERAPY 1/> REGIONS: CPT | Performed by: OCCUPATIONAL THERAPY ASSISTANT

## 2022-05-10 NOTE — PROGRESS NOTES
[facility] [address] [place] [address]  Dept: [address] [place] [address] [address]: [address] OT Fax: [phone]       OCCUPATIONAL THERAPY PROGRESS NOTE      Date:  5/10/2022  Initial Evaluation Date: 22      Patient Name:  Rena España    :  1965     Restrictions/Precautions:  No resistance pinching until 6 - 8 weeks post op, low fall risk  Diagnosis:  R thumb dog bite injury with radial and ulnar nerve repair. S64.31XA (ICD-10-CM) - Injury of digital nerve of right thumb, initial encounter                                                        Date of Surgery/Injury:  Injury 3/19/22. Sx 2022  ( 3 weeks post op)      Insurance/Certification information:  [facility] Medicaid. Plan of care signed (Y/N): Y (thru 22)  Visit# / total visits:  to 16 sessions approved       Referring Practitioner:  Dr. Rajan Briscoe  Specific Practitioner Orders: Right thumb rom as tolerated, Scar desensitization and management. Modalities prn  Frequency 3x week for 6 weeks     Assessment of current deficits   []? Functional mobility             [x]? ADLs          [x]? Strength                  []? Cognition   []? Functional transfers           []? IADLs         []? Safety Awareness  [x]? Endurance   [x]? Fine Motor Coordination    []? Balance      []? Vision/perception   [x]? Sensation     []? Gross Motor Coordination [x]? ROM           [x]? Pain                        [x]? Edema          [x]? Scar Adhesion/Skin Integrity      OT PLAN OF CARE   OT POC based on physician orders, patient diagnosis and results of clinical assessment     Frequency/Duration  2 x's a week for 12 - 16 sessions. Specific OT Treatment to include:      [x]? Instruction in HEP                   Modalities:  [x]?  Therapeutic Exercise                 [x]? Ultrasound               []? Electrical Stimulation/Attended  [x]? PROM/Stretching                    [x]? Fluidotherapy          [x]?   Paraffin                   []? AAROM  [x]?  AROM                 []? Iontophoresis:   [x]? Addy Caro                                       []? Neuromuscular Re-Ed            [x]? ADL/IADL re-training    [x]?  Therapeutic Activity                  [x]? Pain Management with/without modalities PRN                 [x]?  Manual Therapy                      []? Splinting                                   [x]? Scar Management                   []?Joint Protection/Training  []? Ergonomics                             [x]?  Joint Mobilization                      []? Adaptive Equipment Assessment/Training                             [x]?  Manual Edema Mobilization   []? Myofascial Release                 []? Energy Conservation/Work Simplification  [x]? GM/FM Coordination                []? Safety retraining/education per  individual diagnosis/goals  [x]? Desensitization     Hand strengthening.     Patient Specific Goal:  \"I want the feeling back and be able to bend it. I want to do my normal activities \"                             FZOIG (Long term same as Short term):  1. ) Patient will demonstrate good understanding of home program (exercises/activities/diagnosis/prognosis/goals) with good accuracy. 2. ) Patient will demonstrate increased active/passive range of motion of their Right thumb.to [facility] for ADL/IADL completion. 3. ) Patient will demonstrate increased /pinch strength of at least 10 / 5 pinch pounds of their Right hand. 4. ) Patient to report decreased pain in their affected Right  upper extremity from 5/10 to 2/10 or less with resistive functional use. 5. ) Increase in fine motor function as evidenced by decreased time to complete 9-hole peg test by at least 5-8 seconds. 6. ) Patient will be knowledgeable of edema control techniques as evident with decreases from moderate  to mild/none. 7. ) Patient will demonstrate a non-tender/non-adherent scar.    8. ) Patient will report ADL functions as Mod I/I using L UE> .   9. ) Patient will decrease QuickDASH score to 25% or less for increased participation in daily functional activities. Pain Level: 2-3 on scale of 1-10, tight (pulling) and tender in the thumb    Subjective: Pt. Stated \"I have been doing all my exercises every day\"     Objective:  Updated POC to be completed by 10th visit or 5-25-22. INTERVENTION: COMPLETED: SPECIFICS/COMMENTS:   Modality:     MHP x 5 mins to increase soft tissue mobility prior to ROM        AROM:     Thumb X  X      X    x - AROM all planes  - blocking ex IP/ MP 10x  - thumb slides to index/ middle fingers only 5x each  - towel ex digital flex/ ex 5x/ thumb flex/ ext 5x  -exercise balls CLW 20x / not able CCLW   Opposition    -opposition with small buttons    In hand manipulation x    x -in hand manipulation with coins/ large buttons up to 9  -bunchems in hand translation from hand to table 1 at a time and reverse   Wrist  wristocizer 4 reps with maintaining a full fist to increase palmar abductor stretch during wrist ROM/ very good        AAROM:               PROM/Stretching:     thumb x All planes        Scar Mass/Edema Control:     Soft tissue x To increase soft tissue mobilization and decrease sensitivity    Scar massage/retrograde massage x To decrease scar rigidity and decrease edema    Strengthening:               Other:     HEP x Reviewed all HEP instructions    9 hole testing  x  Left: 19 seconds   Right: 23 seconds     Assessment/Comments: Pt is making Good progress toward stated plan of care. Pt. Tolerated all exercises and stretches today. Pt. Continues to have minimal pain with activity. Will continue to advance as tolerated.      -Rehab Potential: Good  -Requires OT Follow Up: Yes    Time In: 1100            Time Out: 1155          Visit #: 5    Approval thru: 8-31-22    CODE  Minutes  Units Total approved   [id number] Fluidotherapy   0/32   95321 Manual 10 1 4/96   72655 Therapeutic Ex 25 2 7/96   94634 Therapeutic Activity 15 1 4/96   39015 Paraffin   0/32   86055 Ultrasound   0/32    MH 5 0             -Response to Treatment: tolerated session well  Goals: Goals for pt can be see on initial eval 4-25-22, Nine hole testing 4/29/22    Plan:   [x]  Continue Plan of care: Treatment covered based on POC and graduated to patient's progress. Pt education continues at each visit to obtain maximum benefits from skilled OT intervention.   []  [facility] of care:   []  Discharge:      ARAVIND Betancur/GUSTAVO  [id number]

## 2022-05-12 ENCOUNTER — TREATMENT (OUTPATIENT)
Dept: OCCUPATIONAL THERAPY | Age: 57
End: 2022-05-12
Payer: COMMERCIAL

## 2022-05-12 DIAGNOSIS — S64.31XA INJURY OF DIGITAL NERVE OF RIGHT THUMB, INITIAL ENCOUNTER: Primary | ICD-10-CM

## 2022-05-12 PROCEDURE — 97022 WHIRLPOOL THERAPY: CPT | Performed by: OCCUPATIONAL THERAPY ASSISTANT

## 2022-05-12 PROCEDURE — 97140 MANUAL THERAPY 1/> REGIONS: CPT | Performed by: OCCUPATIONAL THERAPY ASSISTANT

## 2022-05-12 PROCEDURE — 97530 THERAPEUTIC ACTIVITIES: CPT | Performed by: OCCUPATIONAL THERAPY ASSISTANT

## 2022-05-12 PROCEDURE — 97110 THERAPEUTIC EXERCISES: CPT | Performed by: OCCUPATIONAL THERAPY ASSISTANT

## 2022-05-12 NOTE — PROGRESS NOTES
[facility] [address] [place] [address]  Dept: [address] [place] [address] [address]: [address] OT Fax: [phone]       OCCUPATIONAL THERAPY PROGRESS NOTE      Date:  2022  Initial Evaluation Date: 22      Patient Name:  Lelia Woods    :  1965     Restrictions/Precautions:  No resistance pinching until 6 - 8 weeks post op, low fall risk  Diagnosis:  R thumb dog bite injury with radial and ulnar nerve repair. S64.31XA (ICD-10-CM) - Injury of digital nerve of right thumb, initial encounter                                                        Date of Surgery/Injury:  Injury 3/19/22. Sx 2022  ( 4 weeks post op)      Insurance/Certification information:  [facility] Medicaid. Plan of care signed (Y/N): Y (thru 22)  Visit# / total visits:  to 16 sessions approved       Referring Practitioner:  Dr. Mumtaz Galindo  Specific Practitioner Orders: Right thumb rom as tolerated, Scar desensitization and management. Modalities prn  Frequency 3x week for 6 weeks     Assessment of current deficits   []? Functional mobility             [x]? ADLs          [x]? Strength                  []? Cognition   []? Functional transfers           []? IADLs         []? Safety Awareness  [x]? Endurance   [x]? Fine Motor Coordination    []? Balance      []? Vision/perception   [x]? Sensation     []? Gross Motor Coordination [x]? ROM           [x]? Pain                        [x]? Edema          [x]? Scar Adhesion/Skin Integrity      OT PLAN OF CARE   OT POC based on physician orders, patient diagnosis and results of clinical assessment     Frequency/Duration  2 x's a week for 12 - 16 sessions. Specific OT Treatment to include:      [x]? Instruction in HEP                   Modalities:  [x]?  Therapeutic Exercise                 [x]? Ultrasound               []? Electrical Stimulation/Attended  [x]? PROM/Stretching                    [x]? Fluidotherapy          [x]?   Paraffin                   []? AAROM  [x]?  AROM                 []? Iontophoresis:   [x]? Genell Erm                                       []? Neuromuscular Re-Ed            [x]? ADL/IADL re-training    [x]?  Therapeutic Activity                  [x]? Pain Management with/without modalities PRN                 [x]?  Manual Therapy                      []? Splinting                                   [x]? Scar Management                   []?Joint Protection/Training  []? Ergonomics                             [x]?  Joint Mobilization                      []? Adaptive Equipment Assessment/Training                             [x]?  Manual Edema Mobilization   []? Myofascial Release                 []? Energy Conservation/Work Simplification  [x]? GM/FM Coordination                []? Safety retraining/education per  individual diagnosis/goals  [x]? Desensitization     Hand strengthening.     Patient Specific Goal:  \"I want the feeling back and be able to bend it. I want to do my normal activities \"                             IYCDW (Long term same as Short term):  1. ) Patient will demonstrate good understanding of home program (exercises/activities/diagnosis/prognosis/goals) with good accuracy. 2. ) Patient will demonstrate increased active/passive range of motion of their Right thumb.to [facility] for ADL/IADL completion. 3. ) Patient will demonstrate increased /pinch strength of at least 10 / 5 pinch pounds of their Right hand. 4. ) Patient to report decreased pain in their affected Right  upper extremity from 5/10 to 2/10 or less with resistive functional use. 5. ) Increase in fine motor function as evidenced by decreased time to complete 9-hole peg test by at least 5-8 seconds. 6. ) Patient will be knowledgeable of edema control techniques as evident with decreases from moderate  to mild/none. 7. ) Patient will demonstrate a non-tender/non-adherent scar.    8. ) Patient will report ADL functions as Mod I/I using L UE> .   9. ) Patient will decrease QuickDASH score to 25% or less for increased participation in daily functional activities. Pain Level: 2 on scale of 1-10, tight (pulling) and tender in the thumb    Subjective: Pt. Stated \"I picked up a cup when I was washing dishes and felt a little pulling in my thumb. \"     Objective:  Updated POC to be completed by 10th visit or 5-25-22. INTERVENTION: COMPLETED: SPECIFICS/COMMENTS:   Modality:     MHP  5 mins to increase soft tissue mobility prior to ROM   Fluidotherapy x10 mins Desensitization with AROM    AROM:     Thumb X  X  x    X    x - AROM all planes  - blocking ex IP/ MP 10x  - thumb slides to index/ middle fingers only 5x each  - towel ex digital flex/ ex 5x/ thumb flex/ ext 5x  -exercise balls CLW 20x / not able CCLW   Opposition    -opposition with small buttons    In hand manipulation x    X    x -in hand manipulation with coins/ large buttons up to 9  -bunchems in hand translation from hand to table 1 at a time and reverse  -purdue pegboard with alt prehension    Wrist  wristocizer 4 reps with maintaining a full fist to increase palmar abductor stretch during wrist ROM/ very good        AAROM:               PROM/Stretching:     thumb x All planes        Scar Mass/Edema Control:     Soft tissue x To increase soft tissue mobilization and decrease sensitivity    Scar massage/retrograde massage x To decrease scar rigidity and decrease edema    Strengthening:               Other:     HEP x Reviewed all HEP instructions    9 hole testing  x  Left: 19 seconds   Right: 23 seconds     Assessment/Comments: Pt is making Good progress toward stated plan of care. Pt. Tolerated all exercises and stretches today. Pt pain remains minimal with all exercises. Pt. Stated she was going to try and kayak tonite. Will continue to advance as tolerated.      -Rehab Potential: Good  -Requires OT Follow Up: Yes    Time In: 1105            Time Out: 1200          Visit #: 6    Approval thru: 8-31-22    CODE  Minutes  Units Total approved   [id number] Fluidotherapy 10 1 1/32   79243 Manual 10 1 5/96   93102 Therapeutic Ex 20 1 8/96   07329 Therapeutic Activity 15 1 5/96   23100 Paraffin   0/32   39610 Ultrasound   0/32    MH               -Response to Treatment: tolerated session well  Goals: Goals for pt can be see on initial eval 4-25-22, Nine hole testing 4/29/22    Plan:   [x]  Continue Plan of care: Focus on AROM of Thumb and scar management next session. Treatment covered based on POC and graduated to patient's progress. Pt education continues at each visit to obtain maximum benefits from skilled OT intervention.   []  [address] of care:   []  Discharge:      ARAVIND Balderas/GUSTAVO  [id number]

## 2022-05-16 ENCOUNTER — TREATMENT (OUTPATIENT)
Dept: OCCUPATIONAL THERAPY | Age: 57
End: 2022-05-16
Payer: COMMERCIAL

## 2022-05-16 DIAGNOSIS — S64.31XA INJURY OF DIGITAL NERVE OF RIGHT THUMB, INITIAL ENCOUNTER: Primary | ICD-10-CM

## 2022-05-16 PROCEDURE — 97530 THERAPEUTIC ACTIVITIES: CPT | Performed by: OCCUPATIONAL THERAPY ASSISTANT

## 2022-05-16 PROCEDURE — 97110 THERAPEUTIC EXERCISES: CPT | Performed by: OCCUPATIONAL THERAPY ASSISTANT

## 2022-05-16 PROCEDURE — 97018 PARAFFIN BATH THERAPY: CPT | Performed by: OCCUPATIONAL THERAPY ASSISTANT

## 2022-05-16 PROCEDURE — 97140 MANUAL THERAPY 1/> REGIONS: CPT | Performed by: OCCUPATIONAL THERAPY ASSISTANT

## 2022-05-16 NOTE — PROGRESS NOTES
[facility] Enid Pineda [address] [place] [address]  Dept: [address] [place] [address] [address]: [address] OT Fax: [phone]       OCCUPATIONAL THERAPY PROGRESS NOTE      Date:  2022  Initial Evaluation Date: 22      Patient Name:  Cm Rebollar    :  1965     Restrictions/Precautions:  No resistance pinching until 6 - 8 weeks post op, low fall risk  Diagnosis:  R thumb dog bite injury with radial and ulnar nerve repair. S64.31XA (ICD-10-CM) - Injury of digital nerve of right thumb, initial encounter                                                        Date of Surgery/Injury:  Injury 3/19/22. Sx 2022  ( 4 weeks post op)      Insurance/Certification information:  [employer] [facility]. Plan of care signed (Y/N): Y (thru 22)  Visit# / total visits:  to 16 sessions approved       Referring Practitioner:  Dr. Tracey Dubose  Specific Practitioner Orders: Right thumb rom as tolerated, Scar desensitization and management. Modalities prn  Frequency 3x week for 6 weeks     Assessment of current deficits   []? Functional mobility             [x]? ADLs          [x]? Strength                  []? Cognition   []? Functional transfers           []? IADLs         []? Safety Awareness  [x]? Endurance   [x]? Fine Motor Coordination    []? Balance      []? Vision/perception   [x]? Sensation     []? Gross Motor Coordination [x]? ROM           [x]? Pain                        [x]? Edema          [x]? Scar Adhesion/Skin Integrity      OT PLAN OF CARE   OT POC based on physician orders, patient diagnosis and results of clinical assessment     Frequency/Duration  2 x's a week for 12 - 16 sessions. Specific OT Treatment to include:      [x]? Instruction in HEP                   Modalities:  [x]?  Therapeutic Exercise                 [x]? Ultrasound               []? Electrical Stimulation/Attended  [x]? PROM/Stretching                    [x]? Fluidotherapy          [x]?   Paraffin                   []? AAROM  [x]?  AROM                 []? Iontophoresis:   [x]? Zula Arab                                       []? Neuromuscular Re-Ed            [x]? ADL/IADL re-training    [x]?  Therapeutic Activity                  [x]? Pain Management with/without modalities PRN                 [x]?  Manual Therapy                      []? Splinting                                   [x]? Scar Management                   []?Joint Protection/Training  []? Ergonomics                             [x]?  Joint Mobilization                      []? Adaptive Equipment Assessment/Training                             [x]?  Manual Edema Mobilization   []? Myofascial Release                 []? Energy Conservation/Work Simplification  [x]? GM/FM Coordination                []? Safety retraining/education per  individual diagnosis/goals  [x]? Desensitization     Hand strengthening.     Patient Specific Goal:  \"I want the feeling back and be able to bend it. I want to do my normal activities \"                             PBVWY (Long term same as Short term):  1. ) Patient will demonstrate good understanding of home program (exercises/activities/diagnosis/prognosis/goals) with good accuracy. 2. ) Patient will demonstrate increased active/passive range of motion of their Right thumb.to [facility] for ADL/IADL completion. 3. ) Patient will demonstrate increased /pinch strength of at least 10 / 5 pinch pounds of their Right hand. 4. ) Patient to report decreased pain in their affected Right  upper extremity from 5/10 to 2/10 or less with resistive functional use. 5. ) Increase in fine motor function as evidenced by decreased time to complete 9-hole peg test by at least 5-8 seconds. 6. ) Patient will be knowledgeable of edema control techniques as evident with decreases from moderate  to mild/none. 7. ) Patient will demonstrate a non-tender/non-adherent scar.    8. ) Patient will report ADL functions as Mod I/I using L UE> .   9. ) Patient will decrease QuickDASH score to 25% or less for increased participation in daily functional activities. Pain Level: 2 on scale of 1-10, tight (pulling) and tender in the thumb    Subjective: Pt. Stated \"I kayaked this weekend, but I didn't use my thumb to paddle\"     Objective:  Updated POC to be completed by 10th visit or 5-25-22. INTERVENTION: COMPLETED: SPECIFICS/COMMENTS:   Modality:     Paraffin bath x10 mins    MHP  5 mins to increase soft tissue mobility prior to ROM   Fluidotherapy  Desensitization with AROM    AROM:     Thumb X  X  x    X    X  x - AROM all planes  - blocking ex IP/ MP 10x  - thumb slides to index/ middle fingers only 5x each  - towel ex digital flex/ ex 5x/ thumb flex/ ext 5x  -exercise balls CLW 20x / not able CCLW  -shuffling cards- 5x    Opposition   x -opposition with small buttons   -alt opposition with small nuts/bolts. In hand manipulation x    X    x -in hand manipulation with coins/ large buttons up to 9  -bunchems in hand translation from hand to table 1 at a time and reverse  -Shoefitre pegboard with alt prehension    Wrist  wristocizer 4 reps with maintaining a full fist to increase palmar abductor stretch during wrist ROM/ very good        AAROM:               PROM/Stretching:     thumb x All planes        Scar Mass/Edema Control:     Soft tissue x To increase soft tissue mobilization and decrease sensitivity    Scar massage/retrograde massage x To decrease scar rigidity and decrease edema    Strengthening:               Other:     HEP x Reviewed all HEP instructions    9 hole testing  x  Left: 19 seconds   Right: 23 seconds     Assessment/Comments: Pt is making Good progress toward stated plan of care. Pt. Tolerated all exercises and stretches today.  Will initiate light strengthening next week at 6 week raúl.     -Rehab Potential: Good  -Requires OT Follow Up: Yes    Time In: 1105            Time Out: 1200 Visit # 7    Approval thru: 8-31-22    CODE  Minutes  Units Total approved   [id number] Fluidotherapy   1/32   91106 Manual 10 1 6/96   26786 Therapeutic Ex 20 1 9/96   75857 Therapeutic Activity 15 1 6/96   83291 Paraffin 10 1 1/32   39281 Ultrasound   0/32    MH               -Response to Treatment: tolerated session well  Goals: Goals for pt can be see on initial eval 4-25-22, Nine hole testing 4/29/22    Plan:   [x]  Continue Plan of care: Focus on AROM, [address] of R hand and scar management next session. Treatment covered based on POC and graduated to patient's progress. Pt education continues at each visit to obtain maximum benefits from skilled OT intervention.   []  [address] of care:   []  Discharge:      [facility], [facility]/L  [id number]

## 2022-05-20 ENCOUNTER — TREATMENT (OUTPATIENT)
Dept: OCCUPATIONAL THERAPY | Age: 57
End: 2022-05-20
Payer: COMMERCIAL

## 2022-05-20 DIAGNOSIS — S64.31XA INJURY OF DIGITAL NERVE OF RIGHT THUMB, INITIAL ENCOUNTER: Primary | ICD-10-CM

## 2022-05-20 PROCEDURE — 97110 THERAPEUTIC EXERCISES: CPT | Performed by: OCCUPATIONAL THERAPY ASSISTANT

## 2022-05-20 PROCEDURE — 97140 MANUAL THERAPY 1/> REGIONS: CPT | Performed by: OCCUPATIONAL THERAPY ASSISTANT

## 2022-05-20 PROCEDURE — 97530 THERAPEUTIC ACTIVITIES: CPT | Performed by: OCCUPATIONAL THERAPY ASSISTANT

## 2022-05-20 PROCEDURE — 97022 WHIRLPOOL THERAPY: CPT | Performed by: OCCUPATIONAL THERAPY ASSISTANT

## 2022-05-20 NOTE — PROGRESS NOTES
[facility] [address] [place] [address]  Dept: [address] [place] [address] [address]: [address] OT Fax: [phone]       OCCUPATIONAL THERAPY PROGRESS NOTE      Date:  2022  Initial Evaluation Date: 22      Patient Name:  Nisha Grider    :  1965     Restrictions/Precautions:  No resistance pinching until 6 - 8 weeks post op, low fall risk  Diagnosis:  R thumb dog bite injury with radial and ulnar nerve repair. S64.31XA (ICD-10-CM) - Injury of digital nerve of right thumb, initial encounter                                                        Date of Surgery/Injury:  Injury 3/19/22. Sx 2022  ( 4 weeks post op)      Insurance/Certification information:  [facility] [place] [facility]. Plan of care signed (Y/N): Y (thru 22)  Visit# / total visits:  to 16 sessions approved       Referring Practitioner:  Dr. Marlin Correa  Specific Practitioner Orders: Right thumb rom as tolerated, Scar desensitization and management. Modalities prn  Frequency 3x week for 6 weeks     Assessment of current deficits   []? Functional mobility             [x]? ADLs          [x]? Strength                  []? Cognition   []? Functional transfers           []? IADLs         []? Safety Awareness  [x]? Endurance   [x]? Fine Motor Coordination    []? Balance      []? Vision/perception   [x]? Sensation     []? Gross Motor Coordination [x]? ROM           [x]? Pain                        [x]? Edema          [x]? Scar Adhesion/Skin Integrity      OT PLAN OF CARE   OT POC based on physician orders, patient diagnosis and results of clinical assessment     Frequency/Duration  2 x's a week for 12 - 16 sessions. Specific OT Treatment to include:      [x]? Instruction in HEP                   Modalities:  [x]?  Therapeutic Exercise                 [x]? Ultrasound               []? Electrical Stimulation/Attended  [x]? PROM/Stretching                    [x]? Fluidotherapy          [x]?   Paraffin                   []? AAROM  [x]?  AROM                 []? Iontophoresis:   [x]? Sofi Plants                                       []? Neuromuscular Re-Ed            [x]? ADL/IADL re-training    [x]?  Therapeutic Activity                  [x]? Pain Management with/without modalities PRN                 [x]?  Manual Therapy                      []? Splinting                                   [x]? Scar Management                   []?Joint Protection/Training  []? Ergonomics                             [x]?  Joint Mobilization                      []? Adaptive Equipment Assessment/Training                             [x]?  Manual Edema Mobilization   []? Myofascial Release                 []? Energy Conservation/Work Simplification  [x]? GM/FM Coordination                []? Safety retraining/education per  individual diagnosis/goals  [x]? Desensitization     Hand strengthening.     Patient Specific Goal:  \"I want the feeling back and be able to bend it. I want to do my normal activities \"                             YBHUH (Long term same as Short term):  1. ) Patient will demonstrate good understanding of home program (exercises/activities/diagnosis/prognosis/goals) with good accuracy. 2. ) Patient will demonstrate increased active/passive range of motion of their Right thumb.to [facility] for ADL/IADL completion. 3. ) Patient will demonstrate increased /pinch strength of at least 10 / 5 pinch pounds of their Right hand. 4. ) Patient to report decreased pain in their affected Right  upper extremity from 5/10 to 2/10 or less with resistive functional use. 5. ) Increase in fine motor function as evidenced by decreased time to complete 9-hole peg test by at least 5-8 seconds. 6. ) Patient will be knowledgeable of edema control techniques as evident with decreases from moderate  to mild/none. 7. ) Patient will demonstrate a non-tender/non-adherent scar.    8. ) Patient will report ADL functions as Mod I/I using L UE> .   9. ) Patient will decrease QuickDASH score to 25% or less for increased participation in daily functional activities. Pain Level: 0 on scale of 1-10, tight (pulling) and tender in the thumb    Subjective: Pt. Stated \"It still gets a little stiff overnight\"    Objective:  Updated POC to be completed by 10th visit or 5-25-22. INTERVENTION: COMPLETED: SPECIFICS/COMMENTS:   Modality:     Paraffin bath     MHP  5 mins to increase soft tissue mobility prior to ROM   Fluidotherapy x10 mins Desensitization with AROM    AROM:     Thumb X  X  x    X    X  x - AROM all planes  - blocking ex IP/ MP 10x  - thumb slides to index/ middle fingers only 5x each  - towel ex digital flex/ ex 5x/ thumb flex/ ext 5x  -exercise balls CLW 20x / not able CCLW  -shuffling cards- 5x    Opposition   x -opposition with small buttons   -alt opposition with small nuts/bolts. In hand manipulation x    X    x -in hand manipulation with coins/ large buttons up to 9  -in hand manipulation with x-small beads  -bunchems in hand translation from hand to table 1 at a time and reverse  -Score[employer]reak pegboard with alt prehension    Wrist  wristocizer 4 reps with maintaining a full fist to increase palmar abductor stretch during wrist ROM/ very good        AAROM:               PROM/Stretching:     thumb x All planes        Scar Mass/Edema Control:     Soft tissue x To increase soft tissue mobilization and decrease sensitivity    Scar massage/retrograde massage x To decrease scar rigidity and decrease edema    Strengthening:     R hand strengthening x -yellow 1.5# digiflex ex's- 35 reps- individual and 35 reps composite. Other:     HEP x Reviewed all HEP instructions    9 hole testing  x  Left: 19 seconds   Right: 23 seconds     Assessment/Comments: Pt is making Good progress toward stated plan of care. Pt. Tolerated all exercises and stretches today.  Pt. AROM continues to improve and challenges her hand at home. Will re-eval next session.     -Rehab Potential: Good  -Requires OT Follow Up: Yes    Time In: 1105            Time Out: 1200          Visit # 8    Approval thru: 8-31-22    CODE  Minutes  Units Total approved   [id number] Fluidotherapy 10 1 2/32   77243 Manual 10 1 7/96   19801 Therapeutic Ex 20 1 10/96   37860 Therapeutic Activity 15 1 7/96   47589 Paraffin   1/32   35632 Ultrasound   0/32    MH               -Response to Treatment: tolerated session well  Goals: Goals for pt can be see on initial eval 4-25-22, Nine hole testing 4/29/22    Plan:   [x]  Continue Plan of care: Focus on AROM, [address] of R hand and scar management next session. Treatment covered based on POC and graduated to patient's progress. Pt education continues at each visit to obtain maximum benefits from skilled OT intervention.   []  [address] of care:   []  Discharge:      ARAVIND Leahy/GUSTAVO  [id number]

## 2022-05-23 ENCOUNTER — TREATMENT (OUTPATIENT)
Dept: OCCUPATIONAL THERAPY | Age: 57
End: 2022-05-23
Payer: COMMERCIAL

## 2022-05-23 DIAGNOSIS — S64.31XA INJURY OF DIGITAL NERVE OF RIGHT THUMB, INITIAL ENCOUNTER: Primary | ICD-10-CM

## 2022-05-23 PROCEDURE — 97018 PARAFFIN BATH THERAPY: CPT | Performed by: OCCUPATIONAL THERAPY ASSISTANT

## 2022-05-23 PROCEDURE — 97140 MANUAL THERAPY 1/> REGIONS: CPT | Performed by: OCCUPATIONAL THERAPY ASSISTANT

## 2022-05-23 PROCEDURE — 97530 THERAPEUTIC ACTIVITIES: CPT | Performed by: OCCUPATIONAL THERAPY ASSISTANT

## 2022-05-23 NOTE — PROGRESS NOTES
[address] [place] [address]  Dept: [address] [place] [address] [address]: [address] OT Fax: [phone]       OCCUPATIONAL THERAPY PROGRESS NOTE/ RE-EVAL      Date:  2022  Initial Evaluation Date: 22      Patient Name:  Tawnya Gan    :  1965     Restrictions/Precautions:  No resistance pinching until 6 - 8 weeks post op, low fall risk  Diagnosis:  R thumb dog bite injury with radial and ulnar nerve repair. S64.31XA (ICD-10-CM) - Injury of digital nerve of right thumb, initial encounter                                                        Date of Surgery/Injury:  Injury 3/19/22. Sx 2022  ( 4 weeks post op)      Insurance/Certification information:  [facility] Medicaid. Plan of care signed (Y/N): Y (thru 22)  Visit# / total visits:  to 16 sessions approved       Referring Practitioner:  Dr. Nestor Rees  Specific Practitioner Orders: Right thumb rom as tolerated, Scar desensitization and management. Modalities prn  Frequency 3x week for 6 weeks     Assessment of current deficits   []? Functional mobility             [x]? ADLs          [x]? Strength                  []? Cognition   []? Functional transfers           []? IADLs         []? Safety Awareness  [x]? Endurance   [x]? Fine Motor Coordination    []? Balance      []? Vision/perception   [x]? Sensation     []? Gross Motor Coordination [x]? ROM           [x]? Pain                        [x]? Edema          [x]? Scar Adhesion/Skin Integrity      OT PLAN OF CARE   OT POC based on physician orders, patient diagnosis and results of clinical assessment     Frequency/Duration  2 x's a week for 12 - 16 sessions. Specific OT Treatment to include:      [x]? Instruction in HEP                   Modalities:  [x]?  Therapeutic Exercise                 [x]? Ultrasound               []? Electrical Stimulation/Attended  [x]? PROM/Stretching                    [x]? Fluidotherapy          [x]?   Paraffin                   []? AAROM  [x]?  AROM                 []? Iontophoresis:   [x]? Haley Krista                                       []? Neuromuscular Re-Ed            [x]? ADL/IADL re-training    [x]?  Therapeutic Activity                  [x]? Pain Management with/without modalities PRN                 [x]?  Manual Therapy                      []? Splinting                                   [x]? Scar Management                   []?Joint Protection/Training  []? Ergonomics                             [x]?  Joint Mobilization                      []? Adaptive Equipment Assessment/Training                             [x]?  Manual Edema Mobilization   []? Myofascial Release                 []? Energy Conservation/Work Simplification  [x]? GM/FM Coordination                []? Safety retraining/education per  individual diagnosis/goals  [x]? Desensitization     Hand strengthening.     Patient Specific Goal:  \"I want the feeling back and be able to bend it. I want to do my normal activities \"                             OJCJL (Long term same as Short term): Updated on 5/23/22  1. ) Patient will demonstrate good understanding of home program (exercises/activities/diagnosis/prognosis/goals) with good accuracy. Good progress towards goal. Pt. Completes all exercises 3-4 times a day. 2. ) Patient will demonstrate increased active/passive range of motion of their Right thumb.to [facility] for ADL/IADL completion. Good progress towards goal. (see measurements below)  3. ) Patient will demonstrate increased /pinch strength of at least 10 / 5 pinch pounds of their Right hand. Good progress towards goal.  strength- 37#, lateral pinch-7# 3 point palmar pinch- 4#.   4. ) Patient to report decreased pain in their affected Right  upper extremity from 5/10 to 2/10 or less with resistive functional use. Goal Met. Pt. Is minimal at this point except for scar sensitivity.    5. ) Increase in fine motor function as evidenced by decreased time to complete 9-hole peg test by at least 5-8 seconds. Good progress towards goal. Nine hole peg test decreased from 23 secs to 19.30 secs. 6. ) Patient will be knowledgeable of edema control techniques as evident with decreases from moderate  to mild/none. Goal Met.   7. ) Patient will demonstrate a non-tender/non-adherent scar. Progress noted. Pt has some hypersensitivity over the scar. 8. ) Patient will report ADL functions as Mod I/I using L UE> . Good progress towards goal. Pt. Completes all ADL's independently, but avoids using RUE for leisure activities like kayaking. 9. ) Patient will decrease QuickDASH score to 25% or less for increased participation in daily functional activities. Goal Met. QuickDASH score decreased from 54% on eval to 25% today. Pain Level: 0 on scale of 1-10, tight (pulling) and tender in the thumb    Subjective: Pt. Stated \"It still gets a little stiff overnight\"    Objective:  Updated POC to be completed by 6/23/22    INTERVENTION: COMPLETED: SPECIFICS/COMMENTS:   Modality:     Paraffin bath     MHP  5 mins to increase soft tissue mobility prior to ROM   Fluidotherapy x10 mins Desensitization with AROM    AROM:     Thumb X  X  x    X    X   - AROM all planes  - blocking ex IP/ MP 10x  - thumb slides to index/ middle fingers only 5x each  - towel ex digital flex/ ex 5x/ thumb flex/ ext 5x  -exercise balls CLW 20x / not able CCLW  -shuffling cards- 5x    Opposition    -opposition with small buttons   -alt opposition with small nuts/bolts.     In hand manipulation     X     -in hand manipulation with coins/ large buttons up to 9  -in hand manipulation with x-small beads  -bunchems in hand translation from hand to table 1 at a time and reverse  -purdue pegboard with alt prehension    Wrist  wristocizer 4 reps with maintaining a full fist to increase palmar abductor stretch during wrist ROM/ very good        AAROM: PROM/Stretching:     thumb x All planes        Scar Mass/Edema Control:     Soft tissue  To increase soft tissue mobilization and decrease sensitivity    Scar massage/retrograde massage  To decrease scar rigidity and decrease edema    Strengthening:     R hand strengthening  -yellow 1.5# digiflex ex's- 35 reps- individual and 35 reps composite. Other:     HEP x Reviewed all HEP instructions    9 hole testing  x  Left: 19 seconds   Right: 23 seconds     Assessment/Comments: Pt is making Good progress toward stated plan of care. Pt. Tolerated all exercises and testing today. Will initiate resistive strengthening next visit. UE Assessment:  PT ahs AROM WNL\"s in her R shoulder, elbow, forearm and digits. Limitations in her thumb as follows:  Right  Hand ROM    AROM [x]? PROM[]? 4/25/22 5/23/22        THUMB MP Extension/Flexion  14-23* H /0-50* 0/50*  L 62*  0/60*         IP Extension/Flexion  23-30* H/ 0-80* 0/20*  L 75* 0/45*          Radial Abduction 53-71* 54*  L 77*  69*         Palmar Abduction 50-71* 57*  L 72*  65*         Opposition To 3rd finger Full         -9 hole peg test decreased from 23 secs on eval to 19.30 secs today.      -Rehab Potential: Good  -Requires OT Follow Up: Yes    Time In: 1105            Time Out: 1200          Visit # 9    Approval thru: 8-31-22    CODE  Minutes  Units Total approved   [id number] Fluidotherapy   2/32   09366 Manual 10 1 8/96   98425 Therapeutic Ex 5 0 10/96   09569 Therapeutic Activity 15 1 8/96   25848 Paraffin 10 1 2/32   64369 Ultrasound   0/32                  40       -Response to Treatment: tolerated session well  Goals: Goals for pt can be see on initial eval 4-25-22, Nine hole testing 4/29/22    Plan:   [x]  Continue Plan of care: Continue with OT 2X a week for 2-3 more weeks. Focus on AROM and light strengthening next session. Treatment covered based on POC and graduated to patient's progress.  Pt education continues at each visit to obtain

## 2022-05-26 ENCOUNTER — TREATMENT (OUTPATIENT)
Dept: OCCUPATIONAL THERAPY | Age: 57
End: 2022-05-26
Payer: COMMERCIAL

## 2022-05-26 DIAGNOSIS — S64.31XA INJURY OF DIGITAL NERVE OF RIGHT THUMB, INITIAL ENCOUNTER: Primary | ICD-10-CM

## 2022-05-26 PROCEDURE — 97530 THERAPEUTIC ACTIVITIES: CPT | Performed by: OCCUPATIONAL THERAPY ASSISTANT

## 2022-05-26 PROCEDURE — 97022 WHIRLPOOL THERAPY: CPT | Performed by: OCCUPATIONAL THERAPY ASSISTANT

## 2022-05-26 PROCEDURE — 97140 MANUAL THERAPY 1/> REGIONS: CPT | Performed by: OCCUPATIONAL THERAPY ASSISTANT

## 2022-05-26 PROCEDURE — 97110 THERAPEUTIC EXERCISES: CPT | Performed by: OCCUPATIONAL THERAPY ASSISTANT

## 2022-05-26 NOTE — PROGRESS NOTES
[facility] [address] [place] [id number]  Dept: [address] [place] [address] [address]: [address] OT Fax: [phone]       OCCUPATIONAL THERAPY PROGRESS NOTE      Date:  2022  Initial Evaluation Date: 22      Patient Name:  Jonathan Winslow    :  1965     Restrictions/Precautions:  No resistance pinching until 6 - 8 weeks post op, low fall risk  Diagnosis:  R thumb dog bite injury with radial and ulnar nerve repair. S64.31XA (ICD-10-CM) - Injury of digital nerve of right thumb, initial encounter                                                        Date of Surgery/Injury:  Injury 3/19/22. Sx 2022  ( 4 weeks post op)      Insurance/Certification information:  [facility]. Plan of care signed (Y/N): Y (thru 22)  Visit# / total visits: 10 / 12 to 16 sessions approved       Referring Practitioner:  Dr. Davalos Postal  Specific Practitioner Orders: Right thumb rom as tolerated, Scar desensitization and management. Modalities prn  Frequency 3x week for 6 weeks     Assessment of current deficits   []? Functional mobility             [x]? ADLs          [x]? Strength                  []? Cognition   []? Functional transfers           []? IADLs         []? Safety Awareness  [x]? Endurance   [x]? Fine Motor Coordination    []? Balance      []? Vision/perception   [x]? Sensation     []? Gross Motor Coordination [x]? ROM           [x]? Pain                        [x]? Edema          [x]? Scar Adhesion/Skin Integrity      OT PLAN OF CARE   OT POC based on physician orders, patient diagnosis and results of clinical assessment     Frequency/Duration  2 x's a week for 12 - 16 sessions. Specific OT Treatment to include:      [x]? Instruction in HEP                   Modalities:  [x]?  Therapeutic Exercise                 [x]? Ultrasound               []? Electrical Stimulation/Attended  [x]? PROM/Stretching                    [x]? Fluidotherapy          [x]?   Paraffin                   []? AAROM  [x]?  AROM                 []? Iontophoresis:   [x]? Carmel Chirinos                                       []? Neuromuscular Re-Ed            [x]? ADL/IADL re-training    [x]?  Therapeutic Activity                  [x]? Pain Management with/without modalities PRN                 [x]?  Manual Therapy                      []? Splinting                                   [x]? Scar Management                   []?Joint Protection/Training  []? Ergonomics                             [x]?  Joint Mobilization                      []? Adaptive Equipment Assessment/Training                             [x]?  Manual Edema Mobilization   []? Myofascial Release                 []? Energy Conservation/Work Simplification  [x]? GM/FM Coordination                []? Safety retraining/education per  individual diagnosis/goals  [x]? Desensitization     Hand strengthening.     Patient Specific Goal:  \"I want the feeling back and be able to bend it. I want to do my normal activities \"                             FTKXK (Long term same as Short term): Updated on 5/23/22  1. ) Patient will demonstrate good understanding of home program (exercises/activities/diagnosis/prognosis/goals) with good accuracy. Good progress towards goal. Pt. Completes all exercises 3-4 times a day. 2. ) Patient will demonstrate increased active/passive range of motion of their Right thumb.to [facility] for ADL/IADL completion. Good progress towards goal. (see measurements below)  3. ) Patient will demonstrate increased /pinch strength of at least 10 / 5 pinch pounds of their Right hand. Good progress towards goal.  strength- 37#, lateral pinch-7# 3 point palmar pinch- 4#.   4. ) Patient to report decreased pain in their affected Right  upper extremity from 5/10 to 2/10 or less with resistive functional use. Goal Met. Pt. Is minimal at this point except for scar sensitivity.    5. ) Increase in fine motor function as evidenced by decreased time to complete 9-hole peg test by at least 5-8 seconds. Good progress towards goal. Nine hole peg test decreased from 23 secs to 19.30 secs. 6. ) Patient will be knowledgeable of edema control techniques as evident with decreases from moderate  to mild/none. Goal Met.   7. ) Patient will demonstrate a non-tender/non-adherent scar. Progress noted. Pt has some hypersensitivity over the scar. 8. ) Patient will report ADL functions as Mod I/I using L UE> . Good progress towards goal. Pt. Completes all ADL's independently, but avoids using RUE for leisure activities like kayaking. 9. ) Patient will decrease QuickDASH score to 25% or less for increased participation in daily functional activities. Goal Met. QuickDASH score decreased from 54% on eval to 25% today. Pain Level: 0 on scale of 1-10, tight (pulling) and tender in the thumb    Subjective: Pt. Stated \"I'm going camping this weekend. \"     Objective:  Updated POC to be completed by 6/23/22    INTERVENTION: COMPLETED: SPECIFICS/COMMENTS:   Modality:     Paraffin bath     MHP  5 mins to increase soft tissue mobility prior to ROM   Fluidotherapy x10 mins Desensitization with AROM    AROM:     Thumb X  X  x    X    X   - AROM all planes  - blocking ex IP/ MP 10x  - thumb slides to index/ middle fingers only 5x each  - towel ex digital flex/ ex 5x/ thumb flex/ ext 5x  -exercise balls CLW 20x / not able CCLW  -shuffling cards- 5x    Opposition    -opposition with small buttons   -alt opposition with small nuts/bolts.     In hand manipulation     X     -in hand manipulation with coins/ large buttons up to 9  -in hand manipulation with x-small beads  -bunchems in hand translation from hand to table 1 at a time and reverse  -purdue pegboard with alt prehension    Wrist  -wristocizer 4 reps with maintaining a full fist to increase palmar abductor stretch during wrist ROM/ very good         AAROM:

## 2022-06-02 ENCOUNTER — TREATMENT (OUTPATIENT)
Dept: OCCUPATIONAL THERAPY | Age: 57
End: 2022-06-02
Payer: COMMERCIAL

## 2022-06-02 DIAGNOSIS — S64.31XA INJURY OF DIGITAL NERVE OF RIGHT THUMB, INITIAL ENCOUNTER: Primary | ICD-10-CM

## 2022-06-02 PROCEDURE — 97530 THERAPEUTIC ACTIVITIES: CPT | Performed by: OCCUPATIONAL THERAPY ASSISTANT

## 2022-06-02 PROCEDURE — 97110 THERAPEUTIC EXERCISES: CPT | Performed by: OCCUPATIONAL THERAPY ASSISTANT

## 2022-06-02 PROCEDURE — 97022 WHIRLPOOL THERAPY: CPT | Performed by: OCCUPATIONAL THERAPY ASSISTANT

## 2022-06-02 NOTE — PROGRESS NOTES
[facility] [address] [place] [id number]  Dept: [address] [place] [address] [address]: [address] OT Fax: [phone]       OCCUPATIONAL THERAPY PROGRESS NOTE      Date:  2022  Initial Evaluation Date: 22      Patient Name:  Matti Sagastume    :  1965     Restrictions/Precautions:  No resistance pinching until 6 - 8 weeks post op, low fall risk  Diagnosis:  R thumb dog bite injury with radial and ulnar nerve repair. S64.31XA (ICD-10-CM) - Injury of digital nerve of right thumb, initial encounter                                                        Date of Surgery/Injury:  Injury 3/19/22. Sx 2022  ( 4 weeks post op)      Insurance/Certification information:  [facility] Medicaid. Plan of care signed (Y/N): Y (thru 22)  Visit# / total visits: 10 / 12 to 16 sessions approved       Referring Practitioner:  Dr. Khurram Andres  Specific Practitioner Orders: Right thumb rom as tolerated, Scar desensitization and management. Modalities prn  Frequency 3x week for 6 weeks     Assessment of current deficits   []? Functional mobility             [x]? ADLs          [x]? Strength                  []? Cognition   []? Functional transfers           []? IADLs         []? Safety Awareness  [x]? Endurance   [x]? Fine Motor Coordination    []? Balance      []? Vision/perception   [x]? Sensation     []? Gross Motor Coordination [x]? ROM           [x]? Pain                        [x]? Edema          [x]? Scar Adhesion/Skin Integrity      OT PLAN OF CARE   OT POC based on physician orders, patient diagnosis and results of clinical assessment     Frequency/Duration  2 x's a week for 12 - 16 sessions. Specific OT Treatment to include:      [x]? Instruction in HEP                   Modalities:  [x]?  Therapeutic Exercise                 [x]? Ultrasound               []? Electrical Stimulation/Attended  [x]? PROM/Stretching                    [x]? Fluidotherapy          [x]?   Paraffin                   []? AAROM  [x]?  AROM                 []? Iontophoresis:   [x]? Addy Caro                                       []? Neuromuscular Re-Ed            [x]? ADL/IADL re-training    [x]?  Therapeutic Activity                  [x]? Pain Management with/without modalities PRN                 [x]?  Manual Therapy                      []? Splinting                                   [x]? Scar Management                   []?Joint Protection/Training  []? Ergonomics                             [x]?  Joint Mobilization                      []? Adaptive Equipment Assessment/Training                             [x]?  Manual Edema Mobilization   []? Myofascial Release                 []? Energy Conservation/Work Simplification  [x]? GM/FM Coordination                []? Safety retraining/education per  individual diagnosis/goals  [x]? Desensitization     Hand strengthening.     Patient Specific Goal:  \"I want the feeling back and be able to bend it. I want to do my normal activities \"                             RMOVZ (Long term same as Short term): Updated on 5/23/22  1. ) Patient will demonstrate good understanding of home program (exercises/activities/diagnosis/prognosis/goals) with good accuracy. Good progress towards goal. Pt. Completes all exercises 3-4 times a day. 2. ) Patient will demonstrate increased active/passive range of motion of their Right thumb.to [facility] for ADL/IADL completion. Good progress towards goal. (see measurements below)  3. ) Patient will demonstrate increased /pinch strength of at least 10 / 5 pinch pounds of their Right hand. Good progress towards goal.  strength- 37#, lateral pinch-7# 3 point palmar pinch- 4#.   4. ) Patient to report decreased pain in their affected Right  upper extremity from 5/10 to 2/10 or less with resistive functional use. Goal Met. Pt. Is minimal at this point except for scar sensitivity.    5. ) Increase in fine motor function as evidenced by decreased time to complete 9-hole peg test by at least 5-8 seconds. Good progress towards goal. Nine hole peg test decreased from 23 secs to 19.30 secs. 6. ) Patient will be knowledgeable of edema control techniques as evident with decreases from moderate  to mild/none. Goal Met.   7. ) Patient will demonstrate a non-tender/non-adherent scar. Progress noted. Pt has some hypersensitivity over the scar. 8. ) Patient will report ADL functions as Mod I/I using L UE> . Good progress towards goal. Pt. Completes all ADL's independently, but avoids using RUE for leisure activities like kayaking. 9. ) Patient will decrease QuickDASH score to 25% or less for increased participation in daily functional activities. Goal Met. QuickDASH score decreased from 54% on eval to 25% today. Pain Level: 0 on scale of 1-10, tight (pulling) and tender in the thumb    Subjective: Pt. Stated \"I am able to lift up full cups now\"     Objective:  Updated POC to be completed by 6/23/22    INTERVENTION: COMPLETED: SPECIFICS/COMMENTS:   Modality:     Paraffin bath     MHP  5 mins to increase soft tissue mobility prior to ROM   Fluidotherapy x10 mins Desensitization with AROM    AROM:     Thumb X  X  x    X    X   - AROM all planes  - blocking ex IP/ MP 10x  - thumb slides to index/ middle fingers only 5x each  - towel ex digital flex/ ex 5x/ thumb flex/ ext 5x  -exercise balls CLW 20x / not able CCLW  -shuffling cards- 5x    Opposition    -opposition with small buttons   -alt opposition with small nuts/bolts.     In hand manipulation     X  x     -in hand manipulation with coins/ large buttons up to 9  -in hand manipulation with x-small beads  -bunchems in hand translation from hand to table 1 at a time and reverse  -purdue pegboard with alt prehension    Wrist  -wristocizer 4 reps with maintaining a full fist to increase palmar abductor stretch during wrist ROM/ very good         AAROM: PROM/Stretching:     thumb x All planes        Scar Mass/Edema Control:     Soft tissue  To increase soft tissue mobilization and decrease sensitivity    Scar massage/retrograde massage  To decrease scar rigidity and decrease edema    Strengthening:     R hand strengthening x    X          X    X    x -Red 3#^ digiflex ex's- 35 reps- individual and 35 reps composite.   -Red resistive clothespin ax with pom poms   -yellow theraputty ex's- - 20 reps, roll/pinch- 3x and thumb flexion- 10 reps. Also placing 8 discs in putty x3.    -opening/closing- 3 types of containers.   -2.2# medicine ball- pick and pass- 10 reps x2.   -2.2# medicine ball- lift and carry- 3 laps. Other:     HEP x Reviewed all HEP instructions    9 hole testing  x  Left: 19 seconds   Right: 23 seconds     Assessment/Comments: Pt is making Good progress toward stated plan of care. Pt. Tolerated all new resistive exercises today at F+ level. Will continue to advance strengthening as tolerated. -Rehab Potential: Good  -Requires OT Follow Up: Yes    Time In: 1105           Time Out: 1200          Visit #11    Approval thru: 8-31-22    CODE  Minutes  Units Total approved   [id number] 31 1 4/32   89778 Manual   9/96   81651 Therapeutic Ex 30 2 13/96   88001 Therapeutic Activity 15 1 10/96   30060 Paraffin   2/32   22369 Ultrasound   0/32                  55       -Response to Treatment: tolerated session well  Goals: Goals for pt can be see on initial eval 4-25-22, Nine hole testing 4/29/22    Plan:   [x]  Continue Plan of care: Focus on AROM and light strengthening next session. Treatment covered based on POC and graduated to patient's progress. Pt education continues at each visit to obtain maximum benefits from skilled OT intervention.   []  [address] of care:   []  Discharge:      ARAVIND Galvan/GUSTAVO  [id number]

## 2022-06-07 ENCOUNTER — TREATMENT (OUTPATIENT)
Dept: OCCUPATIONAL THERAPY | Age: 57
End: 2022-06-07
Payer: COMMERCIAL

## 2022-06-07 DIAGNOSIS — S64.31XA INJURY OF DIGITAL NERVE OF RIGHT THUMB, INITIAL ENCOUNTER: Primary | ICD-10-CM

## 2022-06-07 PROCEDURE — 97530 THERAPEUTIC ACTIVITIES: CPT | Performed by: OCCUPATIONAL THERAPIST

## 2022-06-07 PROCEDURE — 97110 THERAPEUTIC EXERCISES: CPT | Performed by: OCCUPATIONAL THERAPIST

## 2022-06-07 PROCEDURE — 97022 WHIRLPOOL THERAPY: CPT | Performed by: OCCUPATIONAL THERAPIST

## 2022-06-07 NOTE — PROGRESS NOTES
[facility] [address] [place] [id number]  Dept: [address] [place] [address] [address]: [address] OT Fax: [phone]       OCCUPATIONAL THERAPY PROGRESS NOTE      Date:  2022  Initial Evaluation Date: 22      Patient Name:  Landry Wilson    :  1965     Restrictions/Precautions:  No resistance pinching until 6 - 8 weeks post op, low fall risk  Diagnosis:  R thumb dog bite injury with radial and ulnar nerve repair. S64.31XA (ICD-10-CM) - Injury of digital nerve of right thumb, initial encounter                                                        Date of Surgery/Injury:  Injury 3/19/22. Sx 2022  ( 4 weeks post op)      Insurance/Certification information:  [facility]. Plan of care signed (Y/N): Y (thru 22)  Visit# / total visits:  to 16 sessions approved       Referring Practitioner:  Dr. Zach Melendez  Specific Practitioner Orders: Right thumb rom as tolerated, Scar desensitization and management. Modalities prn  Frequency 3x week for 6 weeks     Assessment of current deficits   []? Functional mobility             [x]? ADLs          [x]? Strength                  []? Cognition   []? Functional transfers           []? IADLs         []? Safety Awareness  [x]? Endurance   [x]? Fine Motor Coordination    []? Balance      []? Vision/perception   [x]? Sensation     []? Gross Motor Coordination [x]? ROM           [x]? Pain                        [x]? Edema          [x]? Scar Adhesion/Skin Integrity      OT PLAN OF CARE   OT POC based on physician orders, patient diagnosis and results of clinical assessment     Frequency/Duration  2 x's a week for 12 - 16 sessions. Specific OT Treatment to include:      [x]? Instruction in HEP                   Modalities:  [x]?  Therapeutic Exercise                 [x]? Ultrasound               []? Electrical Stimulation/Attended  [x]? PROM/Stretching                    [x]? Fluidotherapy          [x]?   Paraffin                   []? AAROM  [x]?  AROM                 []? Iontophoresis:   [x]? Cheryn Coil                                       []? Neuromuscular Re-Ed            [x]? ADL/IADL re-training    [x]?  Therapeutic Activity                  [x]? Pain Management with/without modalities PRN                 [x]?  Manual Therapy                      []? Splinting                                   [x]? Scar Management                   []?Joint Protection/Training  []? Ergonomics                             [x]?  Joint Mobilization                      []? Adaptive Equipment Assessment/Training                             [x]?  Manual Edema Mobilization   []? Myofascial Release                 []? Energy Conservation/Work Simplification  [x]? GM/FM Coordination                []? Safety retraining/education per  individual diagnosis/goals  [x]? Desensitization     Hand strengthening.     Patient Specific Goal:  \"I want the feeling back and be able to bend it. I want to do my normal activities \"                             NICOLE (Long term same as Short term): Updated on 5/23/22  1. ) Patient will demonstrate good understanding of home program (exercises/activities/diagnosis/prognosis/goals) with good accuracy. Good progress towards goal. Pt. Completes all exercises 3-4 times a day. 2. ) Patient will demonstrate increased active/passive range of motion of their Right thumb.to [facility] for ADL/IADL completion. Good progress towards goal. (see measurements below)  3. ) Patient will demonstrate increased /pinch strength of at least 10 / 5 pinch pounds of their Right hand. Good progress towards goal.  strength- 37#, lateral pinch-7# 3 point palmar pinch- 4#.   4. ) Patient to report decreased pain in their affected Right  upper extremity from 5/10 to 2/10 or less with resistive functional use. Goal Met. Pt. Is minimal at this point except for scar sensitivity.    5. ) Increase in fine motor function as evidenced by decreased time to complete 9-hole peg test by at least 5-8 seconds. Good progress towards goal. Nine hole peg test decreased from 23 secs to 19.30 secs. 6. ) Patient will be knowledgeable of edema control techniques as evident with decreases from moderate  to mild/none. Goal Met.   7. ) Patient will demonstrate a non-tender/non-adherent scar. Progress noted. Pt has some hypersensitivity over the scar. 8. ) Patient will report ADL functions as Mod I/I using L UE> . Good progress towards goal. Pt. Completes all ADL's independently, but avoids using RUE for leisure activities like kayaking. 9. ) Patient will decrease QuickDASH score to 25% or less for increased participation in daily functional activities. Goal Met. QuickDASH score decreased from 54% on eval to 25% today. Pain Level: 0 on scale of 1-10, tight (pulling) and tender in the thumb    Subjective: Pt. Stated \"I am still numb a little on the thumb\"     Objective:  Updated POC to be completed by 6/23/22    INTERVENTION: COMPLETED: SPECIFICS/COMMENTS:   Modality:     Paraffin bath     MHP  5 mins to increase soft tissue mobility prior to ROM   Fluidotherapy x10 mins Desensitization with AROM    AROM:     Thumb X  X  x    X    X   - AROM all planes  - blocking ex IP/ MP 10x  - thumb slides to index/ middle fingers only 5x each  - towel ex digital flex/ ex 5x/ thumb flex/ ext 5x  -exercise balls CLW 20x / not able CCLW  -shuffling cards- 5x    Opposition    -opposition with small buttons   -alt opposition with small nuts/bolts.     In hand manipulation     X  x     -in hand manipulation with coins/ large buttons up to 9  -in hand manipulation with x-small beads  -bunchems in hand translation from hand to table 1 at a time and reverse  -purdue pegboard with alt prehension    Wrist  -wristocizer 4 reps with maintaining a full fist to increase palmar abductor stretch during wrist ROM/ very good         AAROM: POC and graduated to patient's progress. Pt education continues at each visit to obtain maximum benefits from skilled OT intervention. []  Alter Plan of care:   []  Discharge:       [address] [facility], OTR/L #[id number]

## 2022-06-14 ENCOUNTER — TREATMENT (OUTPATIENT)
Dept: OCCUPATIONAL THERAPY | Age: 57
End: 2022-06-14
Payer: COMMERCIAL

## 2022-06-14 DIAGNOSIS — S64.31XA INJURY OF DIGITAL NERVE OF RIGHT THUMB, INITIAL ENCOUNTER: Primary | ICD-10-CM

## 2022-06-14 PROCEDURE — 97022 WHIRLPOOL THERAPY: CPT | Performed by: OCCUPATIONAL THERAPY ASSISTANT

## 2022-06-14 PROCEDURE — 97110 THERAPEUTIC EXERCISES: CPT | Performed by: OCCUPATIONAL THERAPY ASSISTANT

## 2022-06-14 PROCEDURE — 97530 THERAPEUTIC ACTIVITIES: CPT | Performed by: OCCUPATIONAL THERAPY ASSISTANT

## 2022-06-14 NOTE — PROGRESS NOTES
[facility] [address] [place] [id number]  Dept: [address] [place] [address] [address]: [address] OT Fax: [phone]       OCCUPATIONAL THERAPY PROGRESS NOTE      Date:  2022  Initial Evaluation Date: 22      Patient Name:  Lilliam Toro    :  1965     Restrictions/Precautions:  No resistance pinching until 6 - 8 weeks post op, low fall risk  Diagnosis:  R thumb dog bite injury with radial and ulnar nerve repair. S64.31XA (ICD-10-CM) - Injury of digital nerve of right thumb, initial encounter                                                        Date of Surgery/Injury:  Injury 3/19/22. Sx 2022  ( 4 weeks post op)      Insurance/Certification information:  [facility]. Plan of care signed (Y/N): Y (thru 22)  Visit# / total visits:  to 16 sessions approved       Referring Practitioner:  Dr. Larry Andino  Specific Practitioner Orders: Right thumb rom as tolerated, Scar desensitization and management. Modalities prn  Frequency 3x week for 6 weeks     Assessment of current deficits   []? Functional mobility             [x]? ADLs          [x]? Strength                  []? Cognition   []? Functional transfers           []? IADLs         []? Safety Awareness  [x]? Endurance   [x]? Fine Motor Coordination    []? Balance      []? Vision/perception   [x]? Sensation     []? Gross Motor Coordination [x]? ROM           [x]? Pain                        [x]? Edema          [x]? Scar Adhesion/Skin Integrity      OT PLAN OF CARE   OT POC based on physician orders, patient diagnosis and results of clinical assessment     Frequency/Duration  2 x's a week for 12 - 16 sessions. Specific OT Treatment to include:      [x]? Instruction in HEP                   Modalities:  [x]?  Therapeutic Exercise                 [x]? Ultrasound               []? Electrical Stimulation/Attended  [x]? PROM/Stretching                    [x]? Fluidotherapy          [x]?   Paraffin                   []? AAROM  [x]?  AROM                 []? Iontophoresis:   [x]? [place]                                       []? Neuromuscular Re-Ed            [x]? ADL/IADL re-training    [x]?  Therapeutic Activity                  [x]? Pain Management with/without modalities PRN                 [x]?  Manual Therapy                      []? Splinting                                   [x]? Scar Management                   []?Joint Protection/Training  []? Ergonomics                             [x]?  Joint Mobilization                      []? Adaptive Equipment Assessment/Training                             [x]?  Manual Edema Mobilization   []? Myofascial Release                 []? Energy Conservation/Work Simplification  [x]? GM/FM Coordination                []? Safety retraining/education per  individual diagnosis/goals  [x]? Desensitization     Hand strengthening.     Patient Specific Goal:  \"I want the feeling back and be able to bend it. I want to do my normal activities \"                             NQRPL (Long term same as Short term): Updated on 5/23/22  1. ) Patient will demonstrate good understanding of home program (exercises/activities/diagnosis/prognosis/goals) with good accuracy. Good progress towards goal. Pt. Completes all exercises 3-4 times a day. 2. ) Patient will demonstrate increased active/passive range of motion of their Right thumb.to [facility] for ADL/IADL completion. Good progress towards goal. (see measurements below)  3. ) Patient will demonstrate increased /pinch strength of at least 10 / 5 pinch pounds of their Right hand. Good progress towards goal.  strength- 37#, lateral pinch-7# 3 point palmar pinch- 4#.   4. ) Patient to report decreased pain in their affected Right  upper extremity from 5/10 to 2/10 or less with resistive functional use. Goal Met. Pt. Is minimal at this point except for scar sensitivity.    5. ) Increase in fine motor function as evidenced by decreased time to complete 9-hole peg test by at least 5-8 seconds. Good progress towards goal. Nine hole peg test decreased from 23 secs to 19.30 secs. 6. ) Patient will be knowledgeable of edema control techniques as evident with decreases from moderate  to mild/none. Goal Met.   7. ) Patient will demonstrate a non-tender/non-adherent scar. Progress noted. Pt has some hypersensitivity over the scar. 8. ) Patient will report ADL functions as Mod I/I using L UE> . Good progress towards goal. Pt. Completes all ADL's independently, but avoids using RUE for leisure activities like kayaking. 9. ) Patient will decrease QuickDASH score to 25% or less for increased participation in daily functional activities. Goal Met. QuickDASH score decreased from 54% on eval to 25% today. Pain Level: 0 on scale of 1-10, tight (pulling) and tender in the thumb    Subjective: Pt. Stated \"I was able to use the can opener the right way\"     Objective:  Updated POC to be completed by 6/23/22    INTERVENTION: COMPLETED: SPECIFICS/COMMENTS:   Modality:     Paraffin bath     MHP  5 mins to increase soft tissue mobility prior to ROM   Fluidotherapy x10 mins Desensitization with AROM    AROM:     Thumb X  X  x    X    X   - AROM all planes  - blocking ex IP/ MP 10x  - thumb slides to index/ middle fingers only 5x each  - towel ex digital flex/ ex 5x/ thumb flex/ ext 5x  -exercise balls CLW 20x / not able CCLW  -shuffling cards- 5x    Opposition    -opposition with small buttons   -alt opposition with small nuts/bolts.     In hand manipulation     X  x     -in hand manipulation with coins/ large buttons up to 9  -in hand manipulation with x-small beads  -bunchems in hand translation from hand to table 1 at a time and reverse  -purdue pegboard with alt prehension    Wrist  -wristocizer 4 reps with maintaining a full fist to increase palmar abductor stretch during wrist ROM/ very good         AAROM: PROM/Stretching:     thumb x All planes        Scar Mass/Edema Control:     Soft tissue  To increase soft tissue mobilization and decrease sensitivity    Scar massage/retrograde massage  To decrease scar rigidity and decrease edema    Strengthening:     R hand strengthening x    X            x    X    x -green 5# digiflex ex's- 35 reps- individual and 35 reps composite. -Green clothespin ax with pom poms   -yellow theraputty ex's- - 20 reps, roll/pinch- 3x and thumb flexion- 10 reps. Also placing 8 discs in putty x3.    -opening/closing- 3 types of containers.   -2.2# medicine ball- pick and pass- 10 reps x2.   -2.2# medicine ball- lift and carry- 3 laps. -hand gripper setting 4-15 reps, neutral, supination and pronation     R wrist/forearm strengthening X    x -10# therabar supination and pronation 15 reps  -3# wrist PRE   Other:     HEP x Reviewed all HEP instructions    9 hole testing  x  Left: 19 seconds   Right: 23 seconds     Assessment/Comments: Pt is making Good progress toward stated plan of care. Pt tolerated session well with all increased strengthening activities. Will discharge and continue with HEP after next session     -Rehab Potential: Good  -Requires OT Follow Up: Yes    Time In: 1110         Time Out: 1200          Visit #13    Approval thru: 8-31-22    CODE  Minutes  Units Total approved   [id number] 30 8 6/32   26898 Manual   9/96   41355 Therapeutic Ex 20 1 15/96   48778 Therapeutic Activity 15 1 12/96   83509 Paraffin   2/32   24793 Ultrasound   0/32                 50       -Response to Treatment: tolerated session well  Goals: Goals for pt can be see on initial eval 4-25-22, Nine hole testing 4/29/22    Plan:   [x]  Continue Plan of care: Focus on AROM and strengthening next session. Treatment covered based on POC and graduated to patient's progress. Pt education continues at each visit to obtain maximum benefits from skilled OT intervention.   []  Alter Plan of care:   []  Discharge:      SCOTT Allen/GUSTAVO #[id number]

## 2022-06-17 ENCOUNTER — OFFICE VISIT (OUTPATIENT)
Dept: PAIN MANAGEMENT | Age: 57
End: 2022-06-17
Payer: COMMERCIAL

## 2022-06-17 DIAGNOSIS — Z00.8 ENCOUNTER FOR PSYCHOLOGICAL EVALUATION: Primary | ICD-10-CM

## 2022-06-17 PROCEDURE — 90791 PSYCH DIAGNOSTIC EVALUATION: CPT | Performed by: SOCIAL WORKER

## 2022-06-17 PROCEDURE — 1036F TOBACCO NON-USER: CPT | Performed by: SOCIAL WORKER

## 2022-06-17 NOTE — PROGRESS NOTES
Patient: Miguel Angel Barth    Date of service: 2022        1965     55 y.o. 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 I.  REFERRAL:     Katerina Mcdonough was referred for a psychological evaluation prior to consideration for a utilization of a spinal cord stimulator. This is being considered as a method for control of chronic pain. Katerina Mcdonough was present and on time for the evaluation. She was verbal, made good eye contact, appeared relaxed and provided all the necessary information to complete the evaluation. II. BACKGROUND INFORMATION:     A social history was conducted. Currently patient is .  Lorenzo Huertas  from Cardiac Arrest in 2017. Living Arrangements: Lives in her own home with her daughter Deena Hitchcock 35, [facility] 34  sons and [facility] Tiki 35, Lucy 21, and [facility] 16 daughters   Employment: Unemployed, not seeking work, receives survivor benefits. Last work  or  due to back surgery. Financial social security survivor benefits. Legal none  Domestic Assessment   none reported  The patient reports the following stressors: none    PSYCHOSOCIAL HISTORY    The patient was born and raised in [place]. The patient raised in an 2 parent home (stepfather)   Describes childhood as: PT reported that her parents were  when she was 9or 7years old. Both mom and dad remarried. She lived with mom and dad stayed involved in her life. \"It was awesome, as long as we followed the rules and were respectful it was good\"  Grounding was the punishment use if rules were broken. Siblings: Mahendra Limon passed away at age 61 from aids, Dylan Left passed away 62, Dre 48   Family relationships: Father and 2 brothers have passed away. There was some strain in family relationships due to brothers sexual orientation.  She stated that she has good relationships with mother, living siblings and her children. 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 Abuse History: no  Trauma: no         Medically,    When asked to describe her pain on a 0 to 10 scale with 0 being none and 10 being excruciating, she says she averages about above 10. Pam Pino felt that gabapentin does not appear to affective any longer. She does use OTC medication occasionally but this does not help much either per her report. Physically, the patient says she is capable of walking for moderate to long distances, however, she does have more pain in the evening because of this. Pam Pino is independent in self-care. When asked if she had found anything that assisted with her pain relief, Pam Pino stated that there is really nothing that helps  when things become quite painful. Pam Pino does not report a history of mental health treatment. When questioned about addictive problems, PT stated that she has not had any problems with alcohol or other drug and does not currently use any alcohol or recreational drugs. Avocational interests are camping, hiking and spending time with the grandchildren. The patient feels that she has a normal diet. She does not do any type of  regular formalized exercise. Pam Pino appears to enjoy very strong social support system. She states  she has strong support from family and friends. Compliance issues were discussed. Pam Pino feels that she would not  have any difficulty following through with medical  requests. Pam Pino feels that she does have good knowledge of the medical  procedure under consideration. III.   MENTAL HEALTH STATUS:    Mental Status Exam: appearance:  appropriately dressed and appropriately groomed, behavior:  normal, attitude:  cooperative, speech:  appropriate, mood:  euthymic, affect:  congruent with mood, thought content:  no evidence of psychosis, thought process:  logical and coherent, orientation:  oriented in all spheres, memory:  recent:  good and remote:  good, insight:  good , judgment:  good  and cognitive:  intact and intelligent    Colleen Rowan has no history of suicidal attempts. She feels that she is at  no risk at the present time. Protective factors are \"It has never been a thought\"      IV:  SUMMARY/CONCLUSION:  Based on the results of the present psychological evaluation, Colleen Rowan appears to be a reasonable candidate for the medical procedure under consideration. She is not showing any strong significant psychological counter indicators at the present time. She denies any suicidal risks. She has strong social support. She also feels she has  good information about the medical procedure under consideration and can make a reasonable informed choice. Start time: 12:55 pm         End time: 1:36 pm    Visit Time: 39 MIN     It should be noted, however, that the present psychological report depends  primarily on the veracity of the information provided by the patient. Sincerely;           KEILY Woods,-s, [facility]

## 2022-06-21 ENCOUNTER — HOSPITAL ENCOUNTER (OUTPATIENT)
Age: 57
Discharge: HOME | End: 2022-06-21
Payer: COMMERCIAL

## 2022-06-21 LAB
ALBUMIN SERPL-MCNC: 4.4 G/DL (ref 3.5–5.2)
ALP BLD-CCNC: 77 U/L (ref 35–104)
ALT SERPL-CCNC: 9 U/L (ref 0–32)
ANION GAP SERPL CALCULATED.3IONS-SCNC: 10 MMOL/L (ref 7–16)
AST SERPL-CCNC: 18 U/L (ref 0–31)
BILIRUB SERPL-MCNC: 0.7 MG/DL (ref 0–1.2)
BUN BLDV-MCNC: 12 MG/DL (ref 6–20)
CALCIUM SERPL-MCNC: 9.7 MG/DL (ref 8.6–10.2)
CHLORIDE BLD-SCNC: 105 MMOL/L (ref 98–107)
CO2: 27 MMOL/L (ref 22–29)
CREAT SERPL-MCNC: 1.4 MG/DL (ref 0.5–1)
GFR AFRICAN AMERICAN: 47
GFR NON-AFRICAN AMERICAN: 39 ML/MIN/1.73
GLUCOSE BLD-MCNC: 102 MG/DL (ref 74–99)
HCT VFR BLD CALC: 42.8 % (ref 34–48)
HEMOGLOBIN: 14.7 G/DL (ref 11.5–15.5)
MCH RBC QN AUTO: 28.7 PG (ref 26–35)
MCHC RBC AUTO-ENTMCNC: 34.3 % (ref 32–34.5)
MCV RBC AUTO: 83.4 FL (ref 80–99.9)
PARATHYROID HORMONE INTACT: 49 PG/ML (ref 15–65)
PDW BLD-RTO: 12.5 FL (ref 11.5–15)
PHOSPHORUS: 2.6 MG/DL (ref 2.5–4.5)
PLATELET # BLD: 151 E9/L (ref 130–450)
PMV BLD AUTO: 11.3 FL (ref 7–12)
POTASSIUM SERPL-SCNC: 4.3 MMOL/L (ref 3.5–5)
RBC # BLD: 5.13 E12/L (ref 3.5–5.5)
SODIUM BLD-SCNC: 142 MMOL/L (ref 132–146)
TOTAL PROTEIN: 7.1 G/DL (ref 6.4–8.3)
VITAMIN D 25-HYDROXY: 48 NG/ML (ref 30–100)
WBC # BLD: 5.7 E9/L (ref 4.5–11.5)

## 2022-06-21 PROCEDURE — 83970 ASSAY OF PARATHORMONE: CPT

## 2022-06-21 PROCEDURE — 82306 VITAMIN D 25 HYDROXY: CPT

## 2022-06-21 PROCEDURE — 36415 COLL VENOUS BLD VENIPUNCTURE: CPT

## 2022-06-21 PROCEDURE — 80053 COMPREHEN METABOLIC PANEL: CPT

## 2022-06-21 PROCEDURE — 85027 COMPLETE CBC AUTOMATED: CPT

## 2022-06-21 PROCEDURE — 84100 ASSAY OF PHOSPHORUS: CPT

## 2022-07-07 ENCOUNTER — OFFICE VISIT (OUTPATIENT)
Dept: FAMILY MEDICINE CLINIC | Age: 57
End: 2022-07-07
Payer: COMMERCIAL

## 2022-07-07 VITALS
BODY MASS INDEX: 31.06 KG/M2 | HEART RATE: 79 BPM | HEIGHT: 60 IN | WEIGHT: 158.2 LBS | RESPIRATION RATE: 18 BRPM | DIASTOLIC BLOOD PRESSURE: 67 MMHG | OXYGEN SATURATION: 98 % | TEMPERATURE: 97.3 F | SYSTOLIC BLOOD PRESSURE: 100 MMHG

## 2022-07-07 DIAGNOSIS — G47.00 INSOMNIA, UNSPECIFIED TYPE: ICD-10-CM

## 2022-07-07 DIAGNOSIS — Z12.31 ENCOUNTER FOR SCREENING MAMMOGRAM FOR MALIGNANT NEOPLASM OF BREAST: Primary | ICD-10-CM

## 2022-07-07 DIAGNOSIS — R73.9 HYPERGLYCEMIA: ICD-10-CM

## 2022-07-07 DIAGNOSIS — N18.4 CKD (CHRONIC KIDNEY DISEASE) STAGE 4, GFR 15-29 ML/MIN (HCC): ICD-10-CM

## 2022-07-07 DIAGNOSIS — E89.2 POSTPROCEDURAL HYPOPARATHYROIDISM (HCC): ICD-10-CM

## 2022-07-07 PROCEDURE — 83036 HEMOGLOBIN GLYCOSYLATED A1C: CPT | Performed by: FAMILY MEDICINE

## 2022-07-07 PROCEDURE — 4004F PT TOBACCO SCREEN RCVD TLK: CPT | Performed by: FAMILY MEDICINE

## 2022-07-07 PROCEDURE — G8427 DOCREV CUR MEDS BY ELIG CLIN: HCPCS | Performed by: FAMILY MEDICINE

## 2022-07-07 PROCEDURE — 99214 OFFICE O/P EST MOD 30 MIN: CPT | Performed by: FAMILY MEDICINE

## 2022-07-07 PROCEDURE — G8417 CALC BMI ABV UP PARAM F/U: HCPCS | Performed by: FAMILY MEDICINE

## 2022-07-07 PROCEDURE — 3017F COLORECTAL CA SCREEN DOC REV: CPT | Performed by: FAMILY MEDICINE

## 2022-07-07 RX ORDER — GABAPENTIN 600 MG/1
TABLET ORAL
Qty: 60 TABLET | Refills: 1 | Status: SHIPPED
Start: 2022-07-07 | End: 2022-09-23

## 2022-07-07 SDOH — HEALTH STABILITY: PHYSICAL HEALTH: ON AVERAGE, HOW MANY MINUTES DO YOU ENGAGE IN EXERCISE AT THIS LEVEL?: 120 MIN

## 2022-07-07 SDOH — ECONOMIC STABILITY: HOUSING INSECURITY
IN THE LAST 12 MONTHS, WAS THERE A TIME WHEN YOU DID NOT HAVE A STEADY PLACE TO SLEEP OR SLEPT IN A SHELTER (INCLUDING NOW)?: NO

## 2022-07-07 SDOH — SOCIAL STABILITY: SOCIAL NETWORK
IN A TYPICAL WEEK, HOW MANY TIMES DO YOU TALK ON THE PHONE WITH FAMILY, FRIENDS, OR NEIGHBORS?: MORE THAN THREE TIMES A WEEK

## 2022-07-07 SDOH — ECONOMIC STABILITY: FOOD INSECURITY: WITHIN THE PAST 12 MONTHS, THE FOOD YOU BOUGHT JUST DIDN'T LAST AND YOU DIDN'T HAVE MONEY TO GET MORE.: NEVER TRUE

## 2022-07-07 SDOH — SOCIAL STABILITY: SOCIAL INSECURITY
WITHIN THE LAST YEAR, HAVE TO BEEN RAPED OR FORCED TO HAVE ANY KIND OF SEXUAL ACTIVITY BY YOUR PARTNER OR EX-PARTNER?: NO

## 2022-07-07 SDOH — SOCIAL STABILITY: SOCIAL INSECURITY: WITHIN THE LAST YEAR, HAVE YOU BEEN HUMILIATED OR EMOTIONALLY ABUSED IN OTHER WAYS BY YOUR PARTNER OR EX-PARTNER?: NO

## 2022-07-07 SDOH — HEALTH STABILITY: PHYSICAL HEALTH: ON AVERAGE, HOW MANY DAYS PER WEEK DO YOU ENGAGE IN MODERATE TO STRENUOUS EXERCISE (LIKE A BRISK WALK)?: 5 DAYS

## 2022-07-07 SDOH — SOCIAL STABILITY: SOCIAL NETWORK: ARE YOU MARRIED, WIDOWED, DIVORCED, SEPARATED, NEVER MARRIED, OR LIVING WITH A PARTNER?: WIDOWED

## 2022-07-07 SDOH — SOCIAL STABILITY: SOCIAL NETWORK
DO YOU BELONG TO ANY CLUBS OR ORGANIZATIONS SUCH AS CHURCH GROUPS UNIONS, FRATERNAL OR ATHLETIC GROUPS, OR SCHOOL GROUPS?: NO

## 2022-07-07 SDOH — ECONOMIC STABILITY: TRANSPORTATION INSECURITY
IN THE PAST 12 MONTHS, HAS LACK OF TRANSPORTATION KEPT YOU FROM MEETINGS, WORK, OR FROM GETTING THINGS NEEDED FOR DAILY LIVING?: NO

## 2022-07-07 SDOH — SOCIAL STABILITY: SOCIAL NETWORK: HOW OFTEN DO YOU ATTENT MEETINGS OF THE CLUB OR ORGANIZATION YOU BELONG TO?: NEVER

## 2022-07-07 SDOH — SOCIAL STABILITY: SOCIAL NETWORK: HOW OFTEN DO YOU ATTEND CHURCH OR RELIGIOUS SERVICES?: NEVER

## 2022-07-07 SDOH — ECONOMIC STABILITY: INCOME INSECURITY: IN THE LAST 12 MONTHS, WAS THERE A TIME WHEN YOU WERE NOT ABLE TO PAY THE MORTGAGE OR RENT ON TIME?: NO

## 2022-07-07 SDOH — ECONOMIC STABILITY: TRANSPORTATION INSECURITY
IN THE PAST 12 MONTHS, HAS THE LACK OF TRANSPORTATION KEPT YOU FROM MEDICAL APPOINTMENTS OR FROM GETTING MEDICATIONS?: NO

## 2022-07-07 SDOH — ECONOMIC STABILITY: FOOD INSECURITY: WITHIN THE PAST 12 MONTHS, YOU WORRIED THAT YOUR FOOD WOULD RUN OUT BEFORE YOU GOT MONEY TO BUY MORE.: NEVER TRUE

## 2022-07-07 SDOH — SOCIAL STABILITY: SOCIAL INSECURITY: WITHIN THE LAST YEAR, HAVE YOU BEEN AFRAID OF YOUR PARTNER OR EX-PARTNER?: NO

## 2022-07-07 SDOH — SOCIAL STABILITY: SOCIAL NETWORK: HOW OFTEN DO YOU GET TOGETHER WITH FRIENDS OR RELATIVES?: MORE THAN THREE TIMES A WEEK

## 2022-07-07 SDOH — ECONOMIC STABILITY: INCOME INSECURITY: HOW HARD IS IT FOR YOU TO PAY FOR THE VERY BASICS LIKE FOOD, HOUSING, MEDICAL CARE, AND HEATING?: NOT HARD AT ALL

## 2022-07-07 SDOH — ECONOMIC STABILITY: HOUSING INSECURITY: IN THE LAST 12 MONTHS, HOW MANY PLACES HAVE YOU LIVED?: 1

## 2022-07-07 SDOH — HEALTH STABILITY: MENTAL HEALTH: HOW OFTEN DO YOU HAVE A DRINK CONTAINING ALCOHOL?: NEVER

## 2022-07-07 SDOH — HEALTH STABILITY: MENTAL HEALTH
STRESS IS WHEN SOMEONE FEELS TENSE, NERVOUS, ANXIOUS, OR CAN'T SLEEP AT NIGHT BECAUSE THEIR MIND IS TROUBLED. HOW STRESSED ARE YOU?: NOT AT ALL

## 2022-07-07 ASSESSMENT — PATIENT HEALTH QUESTIONNAIRE - PHQ9
SUM OF ALL RESPONSES TO PHQ9 QUESTIONS 1 & 2: 0
1. LITTLE INTEREST OR PLEASURE IN DOING THINGS: 0
SUM OF ALL RESPONSES TO PHQ QUESTIONS 1-9: 0
2. FEELING DOWN, DEPRESSED OR HOPELESS: 0
SUM OF ALL RESPONSES TO PHQ QUESTIONS 1-9: 0

## 2022-07-07 NOTE — PATIENT INSTRUCTIONS
Continue the gabapentin for now. See the pain doctor tomorrow to discuss better options. Follow up with kidney doctor. Drink 64 oz water per day. Continue the trazodone for sleep.

## 2022-07-07 NOTE — PROGRESS NOTES
FM Progress Note    Subjective:   LBP. Gabapentin not helping. Planning spinal nerve root stimulator. Will be seeing pain mngt tomorrow. Smoker. 5 cigarettes per day. Not ready to quit. R shoulder pain b/l. S/p b/l shoulder surgery with Dr. Lacie Lara. Had parathyroid gland removed. No issues with calcium since then. Asx. CKD. Follows with Nephro. Lots of nsaids in the past. Avoids them now. Trazodone for insomnia 2/2 pain. It helps. Health Maintenance Due   Topic Date Due    Pneumococcal 0-64 years Vaccine (1 - PCV) Never done    Cervical cancer screen  Never done    Shingles vaccine (1 of 2) Never done    Breast cancer screen  08/30/2020    Depression Screen  02/11/2022    COVID-19 Vaccine (3 - Booster for Moderna series) 03/07/2022    Diabetes screen  06/12/2022         Objective:   /67 (Site: Left Upper Arm, Position: Sitting, Cuff Size: Medium Adult)   Pulse 79   Temp 97.3 °F (36.3 °C) (Temporal)   Resp 18   Ht 5' (1.524 m)   Wt 158 lb 3.2 oz (71.8 kg)   LMP 04/15/2015   SpO2 98%   BMI 30.90 kg/m²   General appearance: NAD, alert and interacting appropriately  HEENT: NCAT, PERRLA, EOMI   Resp: CTAB, no [place]  CVS: RRR, no MRG  Abdomen: BS +, SNDNT  Extremities: No clubbing, cyanosis, or edema. Warm. Dry. I have reviewed this patient's previous records. I have reviewed this patient's labs. I have reviewed this patient's medications. Assessment/Plan:    Sergey Mendenhall was seen today for establish care and medication refill. Diagnoses and all orders for this visit:    Encounter for screening mammogram for malignant neoplasm of breast  -     [facility] DIGITAL SCREEN W OR WO CAD BILATERAL;  Future    CKD (chronic kidney disease) stage 4, GFR 15-29 ml/min (HCC)    Postprocedural hypoparathyroidism (HCC)    Hyperglycemia  -     POCT glycosylated hemoglobin (Hb A1C)    Insomnia, unspecified type    Other orders  -     gabapentin (NEURONTIN) 600 MG tablet; TAKE 1 TABLET BY MOUTH 2 TIMES DAILY AS NEEDED FOR PAIN            Patient Instructions   Continue the gabapentin for now. See the pain doctor tomorrow to discuss better options. Follow up with kidney doctor. Drink 64 oz water per day. Continue the trazodone for sleep. Return in about 6 months (around 1/7/2023). Greater than 50% of this 30 minute face-to-face patient encounter was spent counseling and/or care coordination on the following: The primary encounter diagnosis was Encounter for screening mammogram for malignant neoplasm of breast. Diagnoses of CKD (chronic kidney disease) stage 4, GFR 15-29 ml/min ([address] Postprocedural hypoparathyroidism ([address] Hyperglycemia, and Insomnia, unspecified type were also pertinent to this visit.       Electronically signed by Sachin Syed MD on 7/7/2022 at 3:40 PM

## 2022-07-08 ENCOUNTER — OFFICE VISIT (OUTPATIENT)
Dept: PAIN MANAGEMENT | Age: 57
End: 2022-07-08
Payer: COMMERCIAL

## 2022-07-08 VITALS
HEIGHT: 60 IN | SYSTOLIC BLOOD PRESSURE: 110 MMHG | WEIGHT: 152 LBS | TEMPERATURE: 96.8 F | BODY MASS INDEX: 29.84 KG/M2 | DIASTOLIC BLOOD PRESSURE: 68 MMHG | OXYGEN SATURATION: 96 % | RESPIRATION RATE: 16 BRPM | HEART RATE: 82 BPM

## 2022-07-08 DIAGNOSIS — M47.817 LUMBOSACRAL SPONDYLOSIS WITHOUT MYELOPATHY: ICD-10-CM

## 2022-07-08 DIAGNOSIS — M96.1 POSTLAMINECTOMY SYNDROME, LUMBAR: Primary | ICD-10-CM

## 2022-07-08 DIAGNOSIS — M51.36 DDD (DEGENERATIVE DISC DISEASE), LUMBAR: ICD-10-CM

## 2022-07-08 DIAGNOSIS — M54.16 LUMBAR RADICULAR PAIN: ICD-10-CM

## 2022-07-08 PROCEDURE — 99214 OFFICE O/P EST MOD 30 MIN: CPT | Performed by: ANESTHESIOLOGY

## 2022-07-08 PROCEDURE — 99213 OFFICE O/P EST LOW 20 MIN: CPT | Performed by: ANESTHESIOLOGY

## 2022-07-08 PROCEDURE — G8427 DOCREV CUR MEDS BY ELIG CLIN: HCPCS | Performed by: ANESTHESIOLOGY

## 2022-07-08 PROCEDURE — 3017F COLORECTAL CA SCREEN DOC REV: CPT | Performed by: ANESTHESIOLOGY

## 2022-07-08 PROCEDURE — G8417 CALC BMI ABV UP PARAM F/U: HCPCS | Performed by: ANESTHESIOLOGY

## 2022-07-08 PROCEDURE — 4004F PT TOBACCO SCREEN RCVD TLK: CPT | Performed by: ANESTHESIOLOGY

## 2022-07-08 RX ORDER — IBUPROFEN 200 MG
TABLET ORAL EVERY 6 HOURS PRN
COMMUNITY
End: 2022-10-13

## 2022-07-08 NOTE — PROGRESS NOTES
Do you currently have any of the following:    Fever: No  Headache:  No  Cough: No  Shortness of breath: No  Exposed to anyone with these symptoms: No                                                                                                                Saqib Bland [facility] to the [facility] on 7[facility]8/2022. Dennis Hooker is complaining of pain lumbar and bilateral leg pain. The pain is constant. The pain is described as aching, throbbing and burning in her legs and feet and her back is sharp pain especially left side. Pain is rated on her best day at a 7, on her worst day at a 10, and on average at a 6 on the VAS scale. She took her last dose of Neurontin last night and advil approx 3 days ago. Dennis Hooker does not have issues with constipation. Any procedures since your last visit: No, with n/a % relief. She is  on NSAIDS and  is not on anticoagulation medications to include none and is managed by n/a. Pacemaker or defibrillator: No Physician managing device is n/a. Medication Contract and Consent for Opioid Use Documents Filed      No documents found                   /68 (Site: Left Upper Arm)   Pulse 82   Temp 96.8 °F (36 °C)   Resp 16   Ht 5' (1.524 m)   Wt 152 lb (68.9 kg)   LMP 04/15/2015   SpO2 96%   BMI 29.69 kg/m²      Patient's last menstrual period was 04/15/2015.

## 2022-07-08 NOTE — PROGRESS NOTES
[address] [facility], [address] [place] [address] Patrick  [phone]    Follow up Note      Ken Oconnor     Date of Visit:  7/8/2022    CC:  Patient presents for follow up   Chief Complaint   Patient presents with    Follow-up     follow up on spinal stimulator       HPI:    Chronic low back pain for years- prior lumbar surgery in 2013 by Dr. Saba Shelley. Continues to have low back and LE pain. Has been treated in the past at different pain centers. At some point she was on chronic pain medications. She did not like being on opioids and she discontinued going there. Nursing notes and details of the pain history reviewed.  Please see intake notes for details.     NOTE:  H/o CRI- does not want NSAID's BUN- 12, creatinine-1.4 on 6/21/2022    Previous treatments:   Physical Therapy : yes, continues HEP     Medications: - NSAID's : H/o CRI- avoid NSAID's                       - Membrane stabilizers : yes -                       - Opioids : yes, in the past                        - Adjuvants or Others : yes,     Surgeries: yes, L5-S1 PLIF in 2013     She has not been on anticoagulation medications      She has not been on herbal supplements.       She is not diabetic.     H/O Smoking: yes  H/O alcohol abuse : no  H/O Illicit drug use : denies     Employment: stay at home     Imaging studies:    MRI of LS spine: 1/10/2022:  FINDINGS:   BONES/ALIGNMENT: Laminectomies from L4 to S1.  L5-S1 posterior and interbody   fusion.  Grade 1 anterolisthesis of L4 on L5.  Mild retrolisthesis of L3 on   L4.  Vertebral heights are normal.  Bone marrow signal is normal.       SPINAL CORD:  The conus terminates normally.       SOFT TISSUES: No abnormal enhancement is seen of the lumbar spine.  No   paraspinal mass identified.       L1-L2: There is no significant disc protrusion, spinal canal stenosis or   neural foraminal narrowing.       L2-L3: There is no significant disc protrusion, spinal canal stenosis or   neural foraminal narrowing.       L3-L4: Disc bulge, facet and ligament flavum hypertrophy cause mild thecal   sac, moderate right foraminal and severe left foraminal stenosis.       L4-L5: Anterolisthesis, disc bulge, facet and ligament flavum hypertrophy   cause mild thecal sac and moderate bilateral foraminal stenosis.       L5-S1: No stenosis.         Impression   Bilateral foraminal stenosis from L3 to L5. Xray LS flex/ Ext: 1/10/2022:      Impression   No instability evident.  Intact lumbosacral fusion hardware.  Unchanged grade   2 anterolisthesis of L4 on L5.  See above. Xray Cervical spine: 10/20/2021:  FINDINGS:   All 7 cervical vertebrae are visualized and appear normal in height and   alignment.   There is no evidence of prevertebral soft tissue edema or   fracture.  The base of the odontoid appears intact.           Impression   No acute abnormality of the cervical spine. Xray LS spine: 10/20/2021:  Impression   1. 1 cm anterolisthesis of L4 on L5 which is new when compared with the prior   CT scan of 04/14/2015. Singh Ha is likely associated spinal canal stenosis   secondary to the listhesis. 2. Previous transpedicular fusion of L5 and S1.  There is no hardware   complication.           MRI of left shoulder: 10/29/2021:( has undergone surgery since this MRI)  Impression   1. Retracted full-thickness tearing of mid and posterior supraspinatus with   retraction of the torn fibers from the footplate measuring up to 1.9 cm.   2. Less than 50% multifocal partial-thickness articular-surface and   interstitial tearing of infraspinatus between musculotendinous junction and   footplate.  Moderate underlying infraspinatus tendinopathy. 3. Mild tendinosis of the intra-articular long head of the biceps tendon. 4. Mild diffuse labral degeneration.  Mild glenohumeral chondromalacia. Moderate glenohumeral joint effusion.    5. Mild degenerative change of the left AC joint.     5/3/22  Yes Aarceli Joy, DO       Allergies   Allergen Reactions    Penicillin G Hives and Nausea And Vomiting       Social History     Socioeconomic History    Marital status:      Spouse name: Not on file    Number of children: Not on file    Years of education: Not on file    Highest education level: Not on file   Occupational History    Not on file   Tobacco Use    Smoking status: Current Every Day Smoker     Packs/day: 0.25     Years: 10.00     Pack years: 2.50     Types: Cigarettes     Start date: 11/24/2016    Smokeless tobacco: Never Used    Tobacco comment: 3 cigarettes per day   Vaping Use    Vaping Use: Never used   Substance and Sexual Activity    Alcohol use: No    Drug use: No    Sexual activity: Not on file   Other Topics Concern    Not on file   Social History Narrative    Not on file     Social Determinants of Health     Financial Resource Strain: Low Risk     Difficulty of Paying Living Expenses: Not hard at all   Food Insecurity: No Food Insecurity    Worried About Running Out of Food in the Last Year: Never true    Ajit of Food in the Last Year: Never true   Transportation Needs: No Transportation Needs    Lack of Transportation (Medical): No    Lack of Transportation (Non-Medical): No   Physical Activity: Sufficiently Active    Days of Exercise per Week: 5 days    Minutes of Exercise per Session: 120 min   Stress: No Stress Concern Present    Feeling of Stress : Not at all   Social Connections: Socially Isolated    Frequency of Communication with Friends and Family: More than three times a week    Frequency of Social Gatherings with Friends and Family: More than three times a week    Attends Holiness Services: Never    Active Member of Clubs or Organizations: No    Attends Club or Organization Meetings: Never    Marital Status:     Intimate Partner Violence: Unknown    Fear of Current or Ex-Partner: No    Emotionally Abused: No    Physically Abused: Not on file    Sexually Abused: No   Housing Stability: Low Risk     Unable to Pay for Housing in the Last Year: No    Number of Places Lived in the Last Year: 1    Unstable Housing in the Last Year: No       Family History   Problem Relation Age of Onset    Heart Disease Mother     Arthritis Mother     Diabetes Mother     High Blood Pressure Mother     Kidney Disease Mother     Heart Disease Father     High Blood Pressure Father     Arthritis Father     Stroke Father     Diabetes Sister     Arthritis Maternal Uncle     Diabetes Maternal Uncle     Kidney Disease Maternal Uncle     Arthritis Maternal Grandmother        REVIEW OF SYSTEMS:     Shira Leyva denies fever/chills, chest pain, shortness of breath, new bowel or bladder complaints. All other review of systems was negative.     PHYSICAL EXAMINATION:      /68 (Site: Left Upper Arm)   Pulse 82   Temp 96.8 °F (36 °C)   Resp 16   Ht 5' (1.524 m)   Wt 152 lb (68.9 kg)   LMP 04/15/2015   SpO2 96%   BMI 29.69 kg/m²   General:       General appearance:  Pleasant and well-hydrated, in no distress and A & O x 3  Build:Overweight  Function: Rises from seated position easily and Moves about room without difficulty     HEENT:     Head:normocephalic, atraumatic     Lungs:     Breathing:normal breathing pattern      CVS:     RRR     Abdomen:     Shape:non-distended and normal     Cervical spine:     Inspection:normal     Thoracic spine:                Spine inspection:normal      Lumbar spine:     Spine inspection: Scar from the prior surgery- healed well  Palpation: Tenderness paravertebral muscles Yes bilaterally  Range of motion: Decreased,  Sacroiliac joint tenderness Yes bilaterally  SLR : negative bilaterally     Musculoskeletal:     Trigger points no     Extremities:     Tremors:None bilaterally upper and lower  Edema:no  Distal LE peripheral pulses- well felt     Neurological:     Sensory: Normal to light touch      Motor:   No new

## 2022-07-08 NOTE — H&P (VIEW-ONLY)
[address] [place] [address], [address] [place] [address] Patrick  [phone]    Follow up Note      Atha Saint     Date of Visit:  7/8/2022    CC:  Patient presents for follow up   Chief Complaint   Patient presents with    Follow-up     follow up on spinal stimulator       HPI:    Chronic low back pain for years- prior lumbar surgery in 2013 by Dr. Mina Louie. Continues to have low back and LE pain. Has been treated in the past at different pain centers. At some point she was on chronic pain medications. She did not like being on opioids and she discontinued going there. Nursing notes and details of the pain history reviewed.  Please see intake notes for details.     NOTE:  H/o CRI- does not want NSAID's BUN- 12, creatinine-1.4 on 6/21/2022    Previous treatments:   Physical Therapy : yes, continues HEP     Medications: - NSAID's : H/o CRI- avoid NSAID's                       - Membrane stabilizers : yes -                       - Opioids : yes, in the past                        - Adjuvants or Others : yes,     Surgeries: yes, L5-S1 PLIF in 2013     She has not been on anticoagulation medications      She has not been on herbal supplements.       She is not diabetic.     H/O Smoking: yes  H/O alcohol abuse : no  H/O Illicit drug use : denies     Employment: stay at home     Imaging studies:    MRI of LS spine: 1/10/2022:  FINDINGS:   BONES/ALIGNMENT: Laminectomies from L4 to S1.  L5-S1 posterior and interbody   fusion.  Grade 1 anterolisthesis of L4 on L5.  Mild retrolisthesis of L3 on   L4.  Vertebral heights are normal.  Bone marrow signal is normal.       SPINAL CORD:  The conus terminates normally.       SOFT TISSUES: No abnormal enhancement is seen of the lumbar spine.  No   paraspinal mass identified.       L1-L2: There is no significant disc protrusion, spinal canal stenosis or   neural foraminal narrowing.       L2-L3: There is no significant disc protrusion, spinal canal CT Lumbar spine: 3/2014:  Impression-    1. Postsurgical changes. Status post laminectomy at L4-L5 and L5-S1. There is fusion of L5-S1.   2. Arthritic changes in the facet joint. 3. No abscess or hematoma in the surgical bed.        OARRS report[de-identified] Reviewed today    Past Medical History:   Diagnosis Date    Chronic back pain     Chronic kidney disease     stage 3  follows with nephrology    Dog bite of thumb     right    Low back pain        Past Surgical History:   Procedure Laterality Date    CARPAL TUNNEL RELEASE Bilateral     CHOLECYSTECTOMY      HAND SURGERY Right 4/13/2022    RIGHT THUMB EXPLORATION WITH EXCISIONAL DEBRIDEMENT and NEUROLYSIS performed by Roderick Peña MD at [facility]  02/01/2013    PLIF  L5 - S1    NOSE SURGERY      four nose surgeries   ( d/t a fractures)    PAIN MANAGEMENT PROCEDURE N/A 02/01/2022    LUMBAR TRANSFORAMINAL EPIDURAL STEROID INJECTION LEFT L3 &RIGHT L4 UNDER XRAY GUIDANCE (49203) performed by Yudith Nguyen MD at [address] [place] [address] Right 02/09/2018    ROTATOR CUFF REPAIR Right 2008    R rotator cuff repair    SHOULDER ARTHROSCOPY Right 02/21/2020    RIGHT SHOULDER ARTHROSCOPY. SUBACROMIAL DECOMPRESSION, LABRAL AND BICEP  DEBRIDEMENT, ROTATOR CUFF REPAIR performed by Senait Haddad, DO at [address] ARTHROSCOPY Left 11/12/2021    LEFT SHOULDER ARTHROSCOPY, SUBACROMIAL DECOMPRESSION,  ROTATOR CUFF REPAIR  (CPT 49868, 10243) Isacc Pippa) performed by Senait Haddad DO at [facility]       Prior to Admission medications    Medication Sig Start Date End Date Taking?  Authorizing Provider   ibuprofen (ADVIL;MOTRIN) 200 MG tablet Take by mouth every 6 hours as needed for Pain   Yes Historical Provider, MD   gabapentin (NEURONTIN) 600 MG tablet TAKE 1 TABLET BY MOUTH 2 TIMES DAILY AS NEEDED FOR PAIN 7/7/22 3/10/23 Yes Amado Carrion MD   traZODone (DESYREL) 150 MG tablet Take one tab po nightly 5/3/22  Yes Echo Joy, DO       Allergies   Allergen Reactions    Penicillin G Hives and Nausea And Vomiting       Social History     Socioeconomic History    Marital status:      Spouse name: Not on file    Number of children: Not on file    Years of education: Not on file    Highest education level: Not on file   Occupational History    Not on file   Tobacco Use    Smoking status: Current Every Day Smoker     Packs/day: 0.25     Years: 10.00     Pack years: 2.50     Types: Cigarettes     Start date: 11/24/2016    Smokeless tobacco: Never Used    Tobacco comment: 3 cigarettes per day   Vaping Use    Vaping Use: Never used   Substance and Sexual Activity    Alcohol use: No    Drug use: No    Sexual activity: Not on file   Other Topics Concern    Not on file   Social History Narrative    Not on file     Social Determinants of Health     Financial Resource Strain: Low Risk     Difficulty of Paying Living Expenses: Not hard at all   Food Insecurity: No Food Insecurity    Worried About Running Out of Food in the Last Year: Never true    Ajit of Food in the Last Year: Never true   Transportation Needs: No Transportation Needs    Lack of Transportation (Medical): No    Lack of Transportation (Non-Medical): No   Physical Activity: Sufficiently Active    Days of Exercise per Week: 5 days    Minutes of Exercise per Session: 120 min   Stress: No Stress Concern Present    Feeling of Stress : Not at all   Social Connections: Socially Isolated    Frequency of Communication with Friends and Family: More than three times a week    Frequency of Social Gatherings with Friends and Family: More than three times a week    Attends Christian Services: Never    Active Member of Clubs or Organizations: No    Attends Club or Organization Meetings: Never    Marital Status:     Intimate Partner Violence: Unknown    Fear of Current or Ex-Partner: No    Emotionally Abused: No    Physically Abused: Not on file    Sexually Abused: No   Housing Stability: Low Risk     Unable to Pay for Housing in the Last Year: No    Number of Places Lived in the Last Year: 1    Unstable Housing in the Last Year: No       Family History   Problem Relation Age of Onset    Heart Disease Mother     Arthritis Mother     Diabetes Mother     High Blood Pressure Mother     Kidney Disease Mother     Heart Disease Father     High Blood Pressure Father     Arthritis Father     Stroke Father     Diabetes Sister     Arthritis Maternal Uncle     Diabetes Maternal Uncle     Kidney Disease Maternal Uncle     Arthritis Maternal Grandmother        REVIEW OF SYSTEMS:     Cirilo Reason denies fever/chills, chest pain, shortness of breath, new bowel or bladder complaints. All other review of systems was negative.     PHYSICAL EXAMINATION:      /68 (Site: Left Upper Arm)   Pulse 82   Temp 96.8 °F (36 °C)   Resp 16   Ht 5' (1.524 m)   Wt 152 lb (68.9 kg)   LMP 04/15/2015   SpO2 96%   BMI 29.69 kg/m²   General:       General appearance:  Pleasant and well-hydrated, in no distress and A & O x 3  Build:Overweight  Function: Rises from seated position easily and Moves about room without difficulty     HEENT:     Head:normocephalic, atraumatic     Lungs:     Breathing:normal breathing pattern      CVS:     RRR     Abdomen:     Shape:non-distended and normal     Cervical spine:     Inspection:normal     Thoracic spine:                Spine inspection:normal      Lumbar spine:     Spine inspection: Scar from the prior surgery- healed well  Palpation: Tenderness paravertebral muscles Yes bilaterally  Range of motion: Decreased,  Sacroiliac joint tenderness Yes bilaterally  SLR : negative bilaterally     Musculoskeletal:     Trigger points no     Extremities:     Tremors:None bilaterally upper and lower  Edema:no  Distal LE peripheral pulses- well felt     Neurological:     Sensory: Normal to light touch      Motor:   No new focal deficits      Gait:normal Yes     Dermatology:     Skin:no rashes or lesions noted     Assessment/Plan:      Diagnosis Orders   1. Postlaminectomy syndrome, lumbar     2. DDD (degenerative disc disease), lumbar     3. Lumbar radicular pain     4. Lumbosacral spondylosis without myelopathy          55 y.o.  female with H/o  low back / b/lLE pain.        Chronic low back pain: Prior H/o L5-S1 PLIF in 2013 by Dr Mauricio Lr. Has low back / LE pain. Prior interventions and pain meds at different pain center. Does not like pain meds and has discontinued. Failed conservative treatment: Physical therapy / HEP/ meds/ spine interventions. On Gabapentin 600 mg  Bid- helps moderately.     X-ray Flex / Ext LS spine - no instability. MRI of LS spine reviewed. S/p Lumbar TFESI - no significant long term relief. Has been evaluated by NSG - no surgery recommended. Recommended SCS. Psychological eval for SCS trial- completed. States \"Leena appears to be a reasonable candidate for the medical procedure under consideration. She is not showing any strong significant psychological counter indicators at the present time. \"     Detailed discussion about SCS trial / implant process done. All the questions answered.     Will schedule for SCS trial. RBA discussed.      H/o Smoking +     Counseling : Patient encouraged to stay active, watch weight and to continue Regular home exercise program as tolerated - stretching / strengthening.     Smoking cessation counseling : yes -stressed importance of smoking cessation on spine health and general health.     Treatment plan discussed with the patient including medication and procedure side effects.     Controlled Substances Monitoring:   OARRS reviewed     Cristal Polanco MD    CC:  Alejo Elena MD

## 2022-07-15 ENCOUNTER — HOSPITAL ENCOUNTER (OUTPATIENT)
Dept: GENERAL RADIOLOGY | Age: 57
Discharge: HOME | End: 2022-07-17
Payer: COMMERCIAL

## 2022-07-15 DIAGNOSIS — Z12.31 ENCOUNTER FOR SCREENING MAMMOGRAM FOR MALIGNANT NEOPLASM OF BREAST: ICD-10-CM

## 2022-07-15 PROCEDURE — 77063 BREAST TOMOSYNTHESIS BI: CPT

## 2022-07-21 ENCOUNTER — PREP FOR PROCEDURE (OUTPATIENT)
Dept: PAIN MANAGEMENT | Age: 57
End: 2022-07-21

## 2022-08-01 ENCOUNTER — ANESTHESIA EVENT (OUTPATIENT)
Dept: OPERATING ROOM | Age: 57
End: 2022-08-01
Payer: COMMERCIAL

## 2022-08-01 ASSESSMENT — LIFESTYLE VARIABLES: SMOKING_STATUS: 1

## 2022-08-01 NOTE — ANESTHESIA PRE PROCEDURE
Department of Anesthesiology  Preprocedure Note       Name:  Cat Pollock   Age:  55 y.o.  :  1965                                          MRN:  [id number]         Date:  2022      Surgeon: Nicole Goodwin):  Sumaya Peterson MD    Procedure: Procedure(s):  SPINAL CORD STIMULATOR TRIAL WITH [employer]    Medications prior to admission:   Prior to Admission medications    Medication Sig Start Date End Date Taking? Authorizing Provider   ibuprofen (ADVIL;MOTRIN) 200 MG tablet Take by mouth every 6 hours as needed for Pain    Historical Provider, MD   gabapentin (NEURONTIN) 600 MG tablet TAKE 1 TABLET BY MOUTH 2 TIMES DAILY AS NEEDED FOR PAIN 7/7/22 3/10/23  Jenna De La Garza MD   traZODone (DESYREL) 150 MG tablet Take one tab po nightly 5/3/22   [facility]       Current medications:    No current facility-administered medications for this encounter. Current Outpatient Medications   Medication Sig Dispense Refill    ibuprofen (ADVIL;MOTRIN) 200 MG tablet Take by mouth every 6 hours as needed for Pain      gabapentin (NEURONTIN) 600 MG tablet TAKE 1 TABLET BY MOUTH 2 TIMES DAILY AS NEEDED FOR PAIN 60 tablet 1    traZODone (DESYREL) 150 MG tablet Take one tab po nightly 90 tablet 1       Allergies:     Allergies   Allergen Reactions    Penicillin G Hives and Nausea And Vomiting       Problem List:    Patient Active Problem List   Diagnosis Code    Displacement of lumbar intervertebral disc without myelopathy M51.26    Spinal stenosis of lumbar region M48.061    S/P lumbar fusion (PLIF) L5-S1 Z98.1    Bilateral leg pain M79.604, M79.605    DDD (degenerative disc disease), lumbar M51.36    Chronic back pain M54.9, G89.29    Chronic pain G89.29    Lumbar post-laminectomy syndrome M96.1    Neural foraminal stenosis of lumbar spine M48.061    S/P parathyroidectomy (HCC) E89.2    Insomnia G47.00    Stage 3 chronic kidney disease ([facility] [address] N18.30    Shoulder impingement, left History:    Social History     Tobacco Use    Smoking status: Every Day     Packs/day: 0.25     Years: 10.00     Pack years: 2.50     Types: Cigarettes     Start date: 11/24/2016    Smokeless tobacco: Never    Tobacco comments:     3 cigarettes per day   Substance Use Topics    Alcohol use: No                                Ready to quit: Not Answered  Counseling given: Not Answered  Tobacco comments: 3 cigarettes per day      Vital Signs (Current):   Vitals:    07/27/22 1143   Weight: 152 lb (68.9 kg)   Height: 5' (1.524 m)                                              BP Readings from Last 3 Encounters:   07/08/22 110/68   07/07/22 100/67   04/22/22 112/80       NPO Status:                                                                                 BMI:   Wt Readings from Last 3 Encounters:   07/08/22 152 lb (68.9 kg)   07/07/22 158 lb 3.2 oz (71.8 kg)   04/22/22 152 lb (68.9 kg)     Body mass index is 29.69 kg/m². CBC:   Lab Results   Component Value Date/Time    WBC 5.7 06/21/2022 01:23 PM    RBC 5.13 06/21/2022 01:23 PM    HGB 14.7 06/21/2022 01:23 PM    HCT 42.8 06/21/2022 01:23 PM    MCV 83.4 06/21/2022 01:23 PM    RDW 12.5 06/21/2022 01:23 PM     06/21/2022 01:23 PM       CMP:   Lab Results   Component Value Date/Time     06/21/2022 01:23 PM    K 4.3 06/21/2022 01:23 PM     06/21/2022 01:23 PM    CO2 27 06/21/2022 01:23 PM    BUN 12 06/21/2022 01:23 PM    CREATININE 1.4 06/21/2022 01:23 PM    GFRAA 47 06/21/2022 01:23 PM    LABGLOM 39 06/21/2022 01:23 PM    GLUCOSE 102 06/21/2022 01:23 PM    PROT 7.1 06/21/2022 01:23 PM    CALCIUM 9.7 06/21/2022 01:23 PM    BILITOT 0.7 06/21/2022 01:23 PM    ALKPHOS 77 06/21/2022 01:23 PM    AST 18 06/21/2022 01:23 PM    ALT 9 06/21/2022 01:23 PM       POC Tests: No results for input(s): POCGLU, POCNA, POCK, POCCL, POCBUN, POCHEMO, POCHCT in the last 72 hours.     Coags:   Lab Results   Component Value Date/Time    PROTIME 10.5 04/14/2015 10:20 AM PROTIME 11.9 12/01/2010 03:45 PM    INR 1.0 04/14/2015 10:20 AM    APTT 29.3 12/01/2010 03:45 PM       HCG (If Applicable):   Lab Results   Component Value Date    PREGTESTUR NEGATIVE 04/30/2014        ABGs: No results found for: PHART, PO2ART, WQA2YPC, NZV4UYF, BEART, J6FLWMGO     Type & Screen (If Applicable):  No results found for: LABABO, LABRH    Drug/Infectious Status (If Applicable):  Lab Results   Component Value Date/Time    HEPCAB NON REACT 11/27/2012 10:55 AM       COVID-19 Screening (If Applicable): No results found for: COVID19        Anesthesia Evaluation  Patient summary reviewed  Airway: Mallampati: II  TM distance: >3 FB   Neck ROM: full  Mouth opening: > = 3 FB   Dental: normal exam         Pulmonary:normal exam    (+) current smoker                           Cardiovascular:Negative CV ROS                      Neuro/Psych:   (+) neuromuscular disease:,             GI/Hepatic/Renal: Neg GI/Hepatic/Renal ROS  (+) renal disease: CRI,           Endo/Other: Negative Endo/Other ROS   (+) : arthritis:., .                 Abdominal:             Vascular: Other Findings:           Anesthesia Plan      MAC     ASA 2       Induction: intravenous. Anesthetic plan and risks discussed with patient.                     PAT Chart Review:  Chart reviewed per routine on August 1, 2022 at 9:16 AM by Joanna Brooks MD.  (Final assessment and plan per day of surgery team.)    Joanna Brooks MD   8/1/2022

## 2022-08-02 ENCOUNTER — ANESTHESIA (OUTPATIENT)
Dept: OPERATING ROOM | Age: 57
End: 2022-08-02
Payer: COMMERCIAL

## 2022-08-02 ENCOUNTER — HOSPITAL ENCOUNTER (OUTPATIENT)
Age: 57
Setting detail: OUTPATIENT SURGERY
Discharge: HOME | End: 2022-08-02
Attending: ANESTHESIOLOGY | Admitting: ANESTHESIOLOGY
Payer: COMMERCIAL

## 2022-08-02 VITALS
WEIGHT: 158 LBS | RESPIRATION RATE: 16 BRPM | HEIGHT: 60 IN | HEART RATE: 62 BPM | BODY MASS INDEX: 31.02 KG/M2 | OXYGEN SATURATION: 97 % | TEMPERATURE: 97.5 F | SYSTOLIC BLOOD PRESSURE: 128 MMHG | DIASTOLIC BLOOD PRESSURE: 74 MMHG

## 2022-08-02 DIAGNOSIS — Z96.89 S/P INSERTION OF SPINAL CORD STIMULATOR: ICD-10-CM

## 2022-08-02 DIAGNOSIS — M96.1 LUMBAR POSTLAMINECTOMY SYNDROME: Primary | ICD-10-CM

## 2022-08-02 PROCEDURE — 3600000015 HC SURGERY LEVEL 5 ADDTL 15MIN: Performed by: ANESTHESIOLOGY

## 2022-08-02 PROCEDURE — 2580000003 HC RX 258: Performed by: ANESTHESIOLOGY

## 2022-08-02 PROCEDURE — 3600000005 HC SURGERY LEVEL 5 BASE: Performed by: ANESTHESIOLOGY

## 2022-08-02 PROCEDURE — 3700000001 HC ADD 15 MINUTES (ANESTHESIA): Performed by: ANESTHESIOLOGY

## 2022-08-02 PROCEDURE — 7100000011 HC PHASE II RECOVERY - ADDTL 15 MIN: Performed by: ANESTHESIOLOGY

## 2022-08-02 PROCEDURE — 2709999900 HC NON-CHARGEABLE SUPPLY: Performed by: ANESTHESIOLOGY

## 2022-08-02 PROCEDURE — 63650 IMPLANT NEUROELECTRODES: CPT | Performed by: ANESTHESIOLOGY

## 2022-08-02 PROCEDURE — C1778 LEAD, NEUROSTIMULATOR: HCPCS | Performed by: ANESTHESIOLOGY

## 2022-08-02 PROCEDURE — 6360000002 HC RX W HCPCS: Performed by: NURSE ANESTHETIST, CERTIFIED REGISTERED

## 2022-08-02 PROCEDURE — 2500000003 HC RX 250 WO HCPCS: Performed by: ANESTHESIOLOGY

## 2022-08-02 PROCEDURE — C1713 ANCHOR/SCREW BN/BN,TIS/BN: HCPCS | Performed by: ANESTHESIOLOGY

## 2022-08-02 PROCEDURE — 3700000000 HC ANESTHESIA ATTENDED CARE: Performed by: ANESTHESIOLOGY

## 2022-08-02 PROCEDURE — 2500000003 HC RX 250 WO HCPCS

## 2022-08-02 PROCEDURE — 7100000010 HC PHASE II RECOVERY - FIRST 15 MIN: Performed by: ANESTHESIOLOGY

## 2022-08-02 DEVICE — 50CM 16 CONTACT TRIAL LEAD KIT
Type: IMPLANTABLE DEVICE | Status: FUNCTIONAL
Brand: INFINION™  16

## 2022-08-02 RX ORDER — CLINDAMYCIN PHOSPHATE 900 MG/50ML
INJECTION INTRAVENOUS
Status: COMPLETED
Start: 2022-08-02 | End: 2022-08-02

## 2022-08-02 RX ORDER — OXYCODONE HYDROCHLORIDE AND ACETAMINOPHEN 5; 325 MG/1; MG/1
1 TABLET ORAL EVERY 8 HOURS PRN
Qty: 21 TABLET | Refills: 0 | Status: SHIPPED | OUTPATIENT
Start: 2022-08-02 | End: 2022-08-09

## 2022-08-02 RX ORDER — SODIUM CHLORIDE, SODIUM LACTATE, POTASSIUM CHLORIDE, CALCIUM CHLORIDE 600; 310; 30; 20 MG/100ML; MG/100ML; MG/100ML; MG/100ML
INJECTION, SOLUTION INTRAVENOUS CONTINUOUS
Status: DISCONTINUED | OUTPATIENT
Start: 2022-08-02 | End: 2022-08-02 | Stop reason: HOSPADM

## 2022-08-02 RX ORDER — FENTANYL CITRATE 50 UG/ML
INJECTION, SOLUTION INTRAMUSCULAR; INTRAVENOUS PRN
Status: DISCONTINUED | OUTPATIENT
Start: 2022-08-02 | End: 2022-08-02 | Stop reason: SDUPTHER

## 2022-08-02 RX ORDER — CLINDAMYCIN HYDROCHLORIDE 300 MG/1
300 CAPSULE ORAL 3 TIMES DAILY
Qty: 30 CAPSULE | Refills: 0 | Status: SHIPPED | OUTPATIENT
Start: 2022-08-02 | End: 2022-08-12

## 2022-08-02 RX ORDER — MIDAZOLAM HYDROCHLORIDE 1 MG/ML
INJECTION INTRAMUSCULAR; INTRAVENOUS PRN
Status: DISCONTINUED | OUTPATIENT
Start: 2022-08-02 | End: 2022-08-02 | Stop reason: SDUPTHER

## 2022-08-02 RX ORDER — CLINDAMYCIN PHOSPHATE 900 MG/50ML
900 INJECTION INTRAVENOUS ONCE
Status: COMPLETED | OUTPATIENT
Start: 2022-08-02 | End: 2022-08-02

## 2022-08-02 RX ADMIN — CLINDAMYCIN PHOSPHATE 900 MG: 900 INJECTION INTRAVENOUS at 08:45

## 2022-08-02 RX ADMIN — FENTANYL CITRATE 100 MCG: 50 INJECTION INTRAMUSCULAR; INTRAVENOUS at 08:40

## 2022-08-02 RX ADMIN — MIDAZOLAM 1 MG: 1 INJECTION INTRAMUSCULAR; INTRAVENOUS at 08:34

## 2022-08-02 RX ADMIN — SODIUM CHLORIDE, POTASSIUM CHLORIDE, SODIUM LACTATE AND CALCIUM CHLORIDE: 600; 310; 30; 20 INJECTION, SOLUTION INTRAVENOUS at 08:05

## 2022-08-02 RX ADMIN — CLINDAMYCIN PHOSPHATE 900 MG: 900 INJECTION, SOLUTION INTRAVENOUS at 08:45

## 2022-08-02 RX ADMIN — MIDAZOLAM 1 MG: 1 INJECTION INTRAMUSCULAR; INTRAVENOUS at 08:40

## 2022-08-02 ASSESSMENT — PAIN - FUNCTIONAL ASSESSMENT: PAIN_FUNCTIONAL_ASSESSMENT: 0-10

## 2022-08-02 NOTE — DISCHARGE INSTRUCTIONS
an over-the-counter pain medicine, such as acetaminophen (Tylenol), ibuprofen (Advil, Motrin), or naproxen (Aleve). Be safe with medicines. Read and follow all instructions on the label. Do not take two or more pain medicines at the same time unless the doctor told you to. Many pain medicines have acetaminophen, which is Tylenol. Too much acetaminophen (Tylenol) can be harmful. Prop up the area on a pillow anytime you sit or lie down during the next 3 days. Try to keep it above the level of your heart. This will help reduce swelling. Keep the skin clean and dry. You may have a bandage over the cut (incision). A bandage helps the incision heal and protects it. Your doctor will tell you how to take care of this. Keep it clean and dry. You may have drainage from the wound. If your doctor told you how to care for your incision, follow your doctor's instructions. If you did not get instructions, follow this general advice:  Wash around the incision with clean water 2 times a day. Don't use hydrogen peroxide or alcohol, which can slow healing. When should you call for help? Call your doctor now or seek immediate medical care if:    You have signs that your infection is getting worse, such as: Increased pain, swelling, warmth, or redness in the area. Red streaks leading from the area. Pus draining from the wound. A new or higher fever. Watch closely for changes in your health, and be sure to contact your doctor ifyou have any problems. Where can you learn more? Go to https://Koubei.com.[employer]. org and sign in to your [employer] account. Enter C340 in the [employer] box to learn more about \"Infection After Surgery: Care Instructions. \"     If you do not have an account, please click on the \"Sign Up Now\" link. Current as of: January 20, 2022               Content Version: 13.3  © 2006-2022 Healthwise, Incorporated. Care instructions adapted under license by [facility] ([facility]).  If you have questions about a medical condition or this instruction, always ask your healthcare professional. [address] any warranty or liability for your use of this information. Nausea and Vomiting After Surgery: Care Instructions  Your Care Instructions     After you've had surgery, you may feel sick to your stomach (nauseated) or you may vomit. Sometimes anesthesia can make you feel sick. It's a common side effect and often doesn't last long. Pain also can make you feel sick or vomit. After the anesthesia wears off, you may feel pain from the incision (cut). That pain could then upset your stomach. Taking pain medicine can also make you feelsick to your stomach. Whatever the cause, you may get medicine that can help. There are also somethings you can do at home to prevent nausea and feel better. The doctor has checked you carefully, but problems can develop later. If you notice any problems or new symptoms, get medical treatment right away. Follow-up care is a key part of your treatment and safety. Be sure to make and go to all appointments, and call your doctor if you are having problems. It's also a good idea to know your test results and keep alist of the medicines you take. How can you care for yourself at home? Be safe with medicines. Read and follow all instructions on the label. If the doctor gave you a prescription medicine for pain, take it as prescribed. If you are not taking a prescription pain medicine, ask your doctor if you can take an over-the-counter medicine. Take your pain medicine as soon as you have pain. It works better if you take it before the pain gets bad. Call your doctor if you have any problems with your medicine. Rest in bed until you feel better. To prevent dehydration, drink plenty of fluids. Choose water and other clear liquids until you feel better.  If you have kidney, heart, or liver disease and have to limit fluids, talk with your doctor before you increase the amount of fluids you drink. When you are able to eat, try clear soups, mild foods, and liquids until all symptoms are gone for 12 to 48 hours. Other good choices include dry toast, crackers, cooked cereal, and gelatin dessert, such as Jell-O. Do not smoke. Smoking and being around smoke can make nausea worse. If you need help quitting, talk to your doctor about stop-smoking programs and medicines. These can increase your chances of quitting for good. When should you call for help? Call 911  anytime you think you may need emergency care. For example, call if:    You passed out (lost consciousness). Call your doctor now or seek immediate medical care if:    You have new or worse nausea or vomiting. You are too sick to your stomach to drink any fluids. You cannot keep down fluids. You have symptoms of dehydration, such as:  Dry eyes and a dry mouth. Passing only a little urine. Feeling thirstier than usual.     Your pain medicine is not helping. You are dizzy or lightheaded, or you feel like you may faint. Watch closely for changes in your health, and be sure to contact your doctor if:    You do not get better as expected. Where can you learn more? Go to https://Itsworld SiciliapeFoxyTunes.Pathfire. org and sign in to your [employer] account. Enter M536 in the [facility] box to learn more about \"Nausea and Vomiting After Surgery: Care Instructions. \"     If you do not have an account, please click on the \"Sign Up Now\" link. Current as of: March 9, 2022               Content Version: 13.3  © 2006-2022 Healthwise, Incorporated. Care instructions adapted under license by [facility] ([facility]). If you have questions about a medical condition or this instruction, always ask your healthcare professional. [place] [address] any warranty or liability for your use of this information.

## 2022-08-02 NOTE — INTERVAL H&P NOTE
Update History & Physical    The patient's History and Physical of July 8, 2022 was reviewed with the patient and I examined the patient. There was no change. The surgical site was confirmed by the patient and me. Plan: The risks, benefits, expected outcome, and alternative to the recommended procedure have been discussed with the patient. Patient understands and wants to proceed with the procedure.      Electronically signed by Shira Garvin MD on 8/2/2022

## 2022-08-02 NOTE — ANESTHESIA POSTPROCEDURE EVALUATION
Department of Anesthesiology  Postprocedure Note    Patient: Amanda Suazo  MRN: [id number]  YOB: 1965  Date of evaluation: 8/2/2022      Procedure Summary     Date: 08/02/22 Room / Location: [address] [place] [address] / [address] [place] [address]    Anesthesia Start: 9770 Anesthesia Stop: 3639    Procedure: SPINAL CORD STIMULATOR TRIAL WITH BOSTON SCIENTIFIC (Back) Diagnosis:       Lumbar radiculopathy      (Lumbar radiculopathy [M54.16])    Surgeons: Choco Mccullough MD Responsible Provider: Trish Garner MD    Anesthesia Type: MAC ASA Status: 2          Anesthesia Type: No value filed.     Omar Phase I: Omar Score: 10    Omar Phase II: Omar Score: 10      Anesthesia Post Evaluation    Patient location during evaluation: PACU  Patient participation: complete - patient participated  Level of consciousness: awake and alert  Pain score: 2  Airway patency: patent  Nausea & Vomiting: no nausea and no vomiting  Complications: no  Cardiovascular status: blood pressure returned to baseline  Respiratory status: acceptable  Hydration status: euvolemic

## 2022-08-02 NOTE — OP NOTE
Operative Note      Patient: Erica Wall  YOB: 1965  MRN: [id number]    Date of Procedure: 2022    Pre-Op Diagnosis: Lumbar radiculopathy [M54.16], Lumbar postlaminectomy syndrome    Post-Op Diagnosis: Same       Procedure(s):  SPINAL CORD STIMULATOR TRIAL WITH BOSTON SCIENTIFIC X 2 leads    Surgeon(s):  Hoa Lakhani MD    Assistant:   * No surgical staff found *    Anesthesia: Monitor Anesthesia Care    Estimated Blood Loss (mL): Minimal    Complications: None    Specimens:   * No specimens in log *    Implants:  Implant Name Type Inv. Item Serial No.  Lot No. LRB No. Used Action   KIT LD TRL L50CM SPNL CRD PERC 16 CNTCT W IMAG RDY MRI FULL - [id number]  KIT LD TRL L50CM SPNL CRD PERC 16 CNTCT W IMAG RDY MRI FULL [id number] [employer]ra[employer]  N/A 1 Implanted   KIT LD TRL L50CM SPNL CRD PERC 16 CNTCT W IMAG RDY MRI FULL - [id number]  KIT LD TRL L50CM SPNL CRD PERC 16 CNTCT W IMAG RDY MRI FULL [id number] BOSTON Zank-Atraverda  N/A 1 Implanted         Drains: * No LDAs found *    Findings: good needle placement. Tip of the leads at Top of T7    Detailed Description of Procedure:   2022    Patient: Erica Wall  :  1965  Age:  55 y.o. Sex:  female     PRE-OPERATIVE DIAGNOSIS: Lumbar postlaminectomy syndrome, Lumbar radicular pain    POST-OPERATIVE DIAGNOSIS: Same      PROCEDURE: Fluoroscopic guided spinal cord stimulator trial.    COMPANY: [employer]    SURGEON: Hoa Lakhani MD    ANESTHESIA: MAC    ESTIMATED BLOOD LOSS: None.  ______________________________________________________________________    BRIEF HISTORY: Erica Wall comes in today for spinal cord stimulator trial under fluoroscopic guidance. The potential complications of this procedure were discussed with her again today. She has elected to undergo the aforementioned procedure.     Leenas complete History & Physical examination were reviewed in depth, a copy of which is in the chart.      DESCRIPTION OF PROCEDURE:   After confirming written and informed consent, a time-out was performed and Yasemin name and date of birth, the procedure to be performed as well as the plan for the location of the needle insertion were confirmed. The patient was brought into the procedure room and placed in the prone position on the fluoroscopy table. A pillow was placed under the patient's abdomen to increase lumbar interlaminar space. Standard monitors were placed, and vital signs were observed throughout the procedure. The area of the lumbar spine was prepped with chloraprep and draped in a sterile manner. Preop antibiotics was given prior to the procedure. The T11-T12 interspace was identified and marked under AP fluoroscopy. The skin and subcutaneous tissues at the above level were anesthestized with 0.5% lidocaine. TWO  # 14 gauge 3-1/2 inch Tuohy epidural needle was advanced with a paramedian approach from the above mentioned levels with loss of resistance technique to identify the entrance into the epidural space. Negative aspiration was confirmed. TWO 16 CONTACT leads were advanced under fluoroscopic guidance. The leads were advanced to the TOP OF T7 level. The leads were connected to an external pulse generator using sterile connectors. Several combinations of electrode configuration, frequency, amplitude and pulse width were used until comfortable, appropriate coverage of the patient's pain area was obtained. The needles(s) were then carefully removed under live fluoroscopy and the lead(s) were secured to the skin using plastic twist-lock anchors and 2-0 silk sutures. Fluoroscopy was used to confirm continued proper placement of the lead(s) before being anchored . The anchors were covered with a gauze dressing under the anchor(s) to pad the skin. The connectors were also wrapped in gauze and secured to the patient.   Patient back was then cleansed and opsites were placed to cover the leads and the connectors, more programming was performed in the recovery area with the device representative. Disposition the patient tolerated the procedure well and there were no complications . Vital signs remained stable throughout the procedure. The patient was escorted to the recovery area where they remained until discharge and written discharge instructions for the procedure were given. Plan: Aury Cisneros will return to our pain management center as scheduled.      Hoa Lakhani MD

## 2022-08-09 ENCOUNTER — OFFICE VISIT (OUTPATIENT)
Dept: PAIN MANAGEMENT | Age: 57
End: 2022-08-09
Payer: COMMERCIAL

## 2022-08-09 VITALS
RESPIRATION RATE: 16 BRPM | HEIGHT: 60 IN | BODY MASS INDEX: 31.02 KG/M2 | HEART RATE: 65 BPM | DIASTOLIC BLOOD PRESSURE: 62 MMHG | SYSTOLIC BLOOD PRESSURE: 118 MMHG | WEIGHT: 158 LBS | OXYGEN SATURATION: 98 % | TEMPERATURE: 97.1 F

## 2022-08-09 DIAGNOSIS — M54.16 LUMBAR RADICULAR PAIN: ICD-10-CM

## 2022-08-09 DIAGNOSIS — M47.817 LUMBOSACRAL SPONDYLOSIS WITHOUT MYELOPATHY: ICD-10-CM

## 2022-08-09 DIAGNOSIS — M51.36 DDD (DEGENERATIVE DISC DISEASE), LUMBAR: ICD-10-CM

## 2022-08-09 DIAGNOSIS — M96.1 POSTLAMINECTOMY SYNDROME, LUMBAR: Primary | ICD-10-CM

## 2022-08-09 PROCEDURE — 99213 OFFICE O/P EST LOW 20 MIN: CPT | Performed by: ANESTHESIOLOGY

## 2022-08-09 PROCEDURE — 99024 POSTOP FOLLOW-UP VISIT: CPT | Performed by: ANESTHESIOLOGY

## 2022-08-09 NOTE — PROGRESS NOTES
Do you currently have any of the following:    Fever: No  Headache:  No  Cough: No  Shortness of breath: No  Exposed to anyone with these symptoms: No                                                                                                                Angela Arts presents to the [facility] on 8/9/2022. Blaze Sow is complaining of pain in lower back . The pain is constant. The pain is described as aching, shooting, and stabbing. Pain is rated on her best day at a 2, on her worst day at a 3, and on average at a 2 on the VAS scale. She took her last dose of Percocet, Neurontin, and Motrin . Blaze Sow does not have issues with constipation. Any procedures since your last visit: Yes, with 95 % relief. She is not on NSAIDS and  is not on anticoagulation medications     Pacemaker or defibrillator: No     Medication Contract and Consent for Opioid Use Documents Filed        No documents found                       LMP 04/15/2015      Patient's last menstrual period was 04/15/2015.

## 2022-08-09 NOTE — PROGRESS NOTES
[facility], [address] [place] [address] Patrick  [phone]    Follow up Note      Ken Oconnor     Date of Visit:  8/9/2022    CC:  Patient presents for follow up   Chief Complaint   Patient presents with    Follow-up     In her lower back and goes into both legs       HPI:    Chronic low back pain for years- prior lumbar surgery in 2013 by Dr. Saba Shelley. Continues to have low back and LE pain. Has been treated in the past at different pain centers. Failed multiple modalities of treatment. Has undergone SCS trial on 8/2/2022 with excellent pain relief of more than 90%, improved pain, improved functionality. Nursing notes and details of the pain history reviewed. Please see intake notes for details. NOTE:  H/o CRI- does not want NSAID's BUN- 12, creatinine-1.4 on 6/21/2022     Previous treatments:  Physical Therapy : yes, continues HEP     Medications: - NSAID's : H/o CRI- avoid NSAID's                       - Membrane stabilizers : yes -                       - Opioids : yes, in the past                        - Adjuvants or Others : yes,     Surgeries: yes, L5-S1 PLIF in 2013     She has not been on anticoagulation medications     She has not been on herbal supplements. She is not diabetic. H/O Smoking: yes  H/O alcohol abuse : no  H/O Illicit drug use : denies     Employment: stay at home     Imaging studies:     MRI of LS spine: 1/10/2022:  FINDINGS:   BONES/ALIGNMENT: Laminectomies from L4 to S1. L5-S1 posterior and interbody   fusion. Grade 1 anterolisthesis of L4 on L5. Mild retrolisthesis of L3 on   L4. Vertebral heights are normal.  Bone marrow signal is normal.       SPINAL CORD:  The conus terminates normally. SOFT TISSUES: No abnormal enhancement is seen of the lumbar spine. No   paraspinal mass identified. L1-L2: There is no significant disc protrusion, spinal canal stenosis or   neural foraminal narrowing.        L2-L3: There is no significant disc protrusion, spinal canal stenosis or   neural foraminal narrowing. L3-L4: Disc bulge, facet and ligament flavum hypertrophy cause mild thecal   sac, moderate right foraminal and severe left foraminal stenosis. L4-L5: Anterolisthesis, disc bulge, facet and ligament flavum hypertrophy   cause mild thecal sac and moderate bilateral foraminal stenosis. L5-S1: No stenosis. Impression   Bilateral foraminal stenosis from L3 to L5. Xray LS flex/ Ext: 1/10/2022:      Impression   No instability evident. Intact lumbosacral fusion hardware. Unchanged grade   2 anterolisthesis of L4 on L5. See above. Xray Cervical spine: 10/20/2021:  FINDINGS:   All 7 cervical vertebrae are visualized and appear normal in height and   alignment. There is no evidence of prevertebral soft tissue edema or   fracture. The base of the odontoid appears intact. Impression   No acute abnormality of the cervical spine. Xray LS spine: 10/20/2021:  Impression   1. 1 cm anterolisthesis of L4 on L5 which is new when compared with the prior   CT scan of 04/14/2015. There is likely associated spinal canal stenosis   secondary to the listhesis. 2. Previous transpedicular fusion of L5 and S1. There is no hardware   complication.              Past Medical History:   Diagnosis Date    Chronic back pain     Chronic kidney disease     stage 3  follows with nephrology    Dog bite of thumb     right    Low back pain        Past Surgical History:   Procedure Laterality Date    CARPAL TUNNEL RELEASE Bilateral     CHOLECYSTECTOMY      HAND SURGERY Right 4/13/2022    RIGHT THUMB EXPLORATION WITH EXCISIONAL DEBRIDEMENT and NEUROLYSIS performed by Harvey Rodrigues MD at [facility] [address]  02/01/2013    PLIF  L5 - S1    NOSE SURGERY      four nose surgeries   ( d/t a fractures)    PAIN MANAGEMENT PROCEDURE N/A 02/01/2022    LUMBAR TRANSFORAMINAL EPIDURAL STEROID INJECTION LEFT L3 &RIGHT L4 UNDER XRAY GUIDANCE (46586) performed by Hoa Lakhani MD at [address] N/A 8/2/2022    SPINAL CORD STIMULATOR TRIAL WITH BOSTON SCIENTIFIC performed by Hoa Lakhani MD at [address] [facility] Right 02/09/2018    ROTATOR CUFF REPAIR Right 2008    R rotator cuff repair    SHOULDER ARTHROSCOPY Right 02/21/2020    RIGHT SHOULDER ARTHROSCOPY. SUBACROMIAL DECOMPRESSION, LABRAL AND BICEP  DEBRIDEMENT, ROTATOR CUFF REPAIR performed by Medarod Alarcon DO at [address] ARTHROSCOPY Left 11/12/2021    LEFT SHOULDER ARTHROSCOPY, SUBACROMIAL DECOMPRESSION,  ROTATOR CUFF REPAIR  (CPT 03397, [id number]) Daniel Meza) performed by Medardo Alarcon DO at [address] [place] [address] [facility]       Prior to Admission medications    Medication Sig Start Date End Date Taking? Authorizing Provider   clindamycin (CLEOCIN) 300 MG capsule Take 1 capsule by mouth in the morning and 1 capsule at noon and 1 capsule before bedtime. Do all this for 10 days. 8/2/22 8/12/22 Yes Hoa Lakhani MD   oxyCODONE-acetaminophen (PERCOCET) 5-325 MG per tablet Take 1 tablet by mouth every 8 hours as needed for Pain (at the procedure site) for up to 7 days. 8/2/22 8/9/22 Yes Hoa Lakhani MD   ibuprofen (ADVIL;MOTRIN) 200 MG tablet Take by mouth every 6 hours as needed for Pain   Yes Historical Provider, MD   gabapentin (NEURONTIN) 600 MG tablet TAKE 1 TABLET BY MOUTH 2 TIMES DAILY AS NEEDED FOR PAIN 7/7/22 3/10/23 Yes Lesa Alford MD   traZODone (DESYREL) 150 MG tablet Take one tab po nightly 5/3/22  Yes Khris Joy DO       Allergies   Allergen Reactions    Penicillin G Hives and Nausea And Vomiting       Social History     Socioeconomic History    Marital status:       Spouse name: Not on file    Number of children: Not on file    Years of education: Not on file    Highest education level: Not on file   Occupational History    Not on file   Tobacco Use    Smoking status: Every Day     Packs/day: 0.25     Years: 10.00     Pack years: 2.50     Types: Cigarettes     Start date: 11/24/2016    Smokeless tobacco: Never    Tobacco comments:     3 cigarettes per day   Vaping Use    Vaping Use: Never used   Substance and Sexual Activity    Alcohol use: No    Drug use: No    Sexual activity: Not on file   Other Topics Concern    Not on file   Social History Narrative    Not on file     Social Determinants of Health     Financial Resource Strain: Low Risk     Difficulty of Paying Living Expenses: Not hard at all   Food Insecurity: No Food Insecurity    Worried About Running Out of Food in the Last Year: Never true    [address] in the Last Year: Never true   Transportation Needs: No Transportation Needs    Lack of Transportation (Medical): No    Lack of Transportation (Non-Medical): No   Physical Activity: Sufficiently Active    Days of Exercise per Week: 5 days    Minutes of Exercise per Session: 120 min   Stress: No Stress Concern Present    Feeling of Stress : Not at all   Social Connections: Socially Isolated    Frequency of Communication with Friends and Family: More than three times a week    Frequency of Social Gatherings with Friends and Family: More than three times a week    Attends [employer] Services: Never    Active Member of Clubs or Organizations: No    Attends Club or Organization Meetings: Never    Marital Status:     Intimate Partner Violence: Unknown    Fear of Current or Ex-Partner: No    Emotionally Abused: No    Physically Abused: Not on file    Sexually Abused: No   Housing Stability: Low Risk     Unable to Pay for Housing in the Last Year: No    Number of Places Lived in the Last Year: 1    Unstable Housing in the Last Year: No       Family History   Problem Relation Age of Onset    Heart Disease Mother     Arthritis Mother     Diabetes Mother     High Blood Pressure Mother     Kidney Disease Mother     Heart Disease Father     High Blood Pressure Father     Arthritis Father     Stroke Father     Diabetes Sister     Arthritis Maternal Uncle     Diabetes Maternal Uncle     Kidney Disease Maternal Uncle     Arthritis Maternal Grandmother        REVIEW OF SYSTEMS:     Renetta Paul denies fever/chills, chest pain, shortness of breath, new bowel or bladder complaints. All other review of systems was negative. PHYSICAL EXAMINATION:      /62   Pulse 65   Temp 97.1 °F (36.2 °C)   Resp 16   Ht 5' (1.524 m)   Wt 158 lb (71.7 kg)   LMP 04/15/2015   SpO2 98%   BMI 30.86 kg/m²     General appearance:A & O  X3       HEENT:     Head:normocephalic, atraumatic     Lungs:     Breathing:normal breathing pattern     CVS:     RRR     Abdomen:     Shape:non-distended and normal     Cervical spine:     Inspection:normal     Thoracic spine:                Spine inspection:normal     Lumbar spine:     Spine inspection: Scar from the prior surgery- healed well  Palpation: Tenderness paravertebral muscles Yes bilaterally  Range of motion: Decreased,  Sacroiliac joint tenderness Yes bilaterally  SLR : negative bilaterally    SCS lead insertion sign CDI-sutures removed in sterile manner Band-Aid applied-lead tip intact      Extremities:     Tremors:None bilaterally upper and lower  Edema:no  Distal LE peripheral pulses- well felt     Neurological:     Sensory: Normal to light touch     Motor:  No new focal deficits      Gait:normal Yes     Dermatology:     Skin:no rashes or lesions noted     Assessment/Plan:       Diagnosis Orders   1. Postlaminectomy syndrome, lumbar     2. DDD (degenerative disc disease), lumbar     3. Lumbar radicular pain     4. Lumbosacral spondylosis without myelopathy            55 y.o.  female with H/o  low back & b/l LE pain. Chronic low back pain: Prior H/o L5-S1 PLIF in 2013 by Dr Edith Garza. Has low back / LE pain. Prior interventions and pain meds at different pain center. Does not like pain meds and has discontinued. Failed conservative treatment: Physical therapy / HEP/ meds/ spine interventions- no long lasting pain relief. Pain causes significant functional limitations. X-ray Flex / Ext LS spine - no instability. MRI of LS spine reviewed. Has been evaluated by NSG - no surgery recommended. Recommended SCS. Psychological eval for SCS trial- completed. States \"Leena appears to be a reasonable candidate for the medical procedure under consideration. She is not showing any strong significant psychological counter indicators at the present time. \"     S/P SCS trial on 8/2/2022 with Σκαφίδια 233 system x 2 lead which provided excellent pain relief of more than 90% and significant improvement in functionality. Overall she is very happy with the outcome and wants to proceed with the implant. Trial leads were taken out without difficulty. Detailed discussion about SCS lead and IPG implant process done. All the questions answered. RBA discussed. Will schedule for permanent implant with percutaneous leads. H/o Smoking +     Counseling : Patient encouraged to stay active, watch weight and to continue Regular home exercise program as tolerated - stretching / strengthening. Smoking cessation counseling : yes -stressed importance of smoking cessation on spine health and general health. Treatment plan discussed with the patient including medication and procedure side effects.      Controlled Substances Monitoring:   OARRS reviewed     Shanti Suarez MD    CC:  Dinah Bright MD

## 2022-08-09 NOTE — H&P (VIEW-ONLY)
[facility], [address] [place] [address] Patrick  [phone]    Follow up Note      Jackie Thorne     Date of Visit:  8/9/2022    CC:  Patient presents for follow up   Chief Complaint   Patient presents with    Follow-up     In her lower back and goes into both legs       HPI:    Chronic low back pain for years- prior lumbar surgery in 2013 by Dr. Jaz Gao. Continues to have low back and LE pain. Has been treated in the past at different pain centers. Failed multiple modalities of treatment. Has undergone SCS trial on 8/2/2022 with excellent pain relief of more than 90%, improved pain, improved functionality. Nursing notes and details of the pain history reviewed. Please see intake notes for details. NOTE:  H/o CRI- does not want NSAID's BUN- 12, creatinine-1.4 on 6/21/2022     Previous treatments:  Physical Therapy : yes, continues HEP     Medications: - NSAID's : H/o CRI- avoid NSAID's                       - Membrane stabilizers : yes -                       - Opioids : yes, in the past                        - Adjuvants or Others : yes,     Surgeries: yes, L5-S1 PLIF in 2013     She has not been on anticoagulation medications     She has not been on herbal supplements. She is not diabetic. H/O Smoking: yes  H/O alcohol abuse : no  H/O Illicit drug use : denies     Employment: stay at home     Imaging studies:     MRI of LS spine: 1/10/2022:  FINDINGS:   BONES/ALIGNMENT: Laminectomies from L4 to S1. L5-S1 posterior and interbody   fusion. Grade 1 anterolisthesis of L4 on L5. Mild retrolisthesis of L3 on   L4. Vertebral heights are normal.  Bone marrow signal is normal.       SPINAL CORD:  The conus terminates normally. SOFT TISSUES: No abnormal enhancement is seen of the lumbar spine. No   paraspinal mass identified. L1-L2: There is no significant disc protrusion, spinal canal stenosis or   neural foraminal narrowing.        L2-L3: There is no significant disc protrusion, spinal canal stenosis or   neural foraminal narrowing. L3-L4: Disc bulge, facet and ligament flavum hypertrophy cause mild thecal   sac, moderate right foraminal and severe left foraminal stenosis. L4-L5: Anterolisthesis, disc bulge, facet and ligament flavum hypertrophy   cause mild thecal sac and moderate bilateral foraminal stenosis. L5-S1: No stenosis. Impression   Bilateral foraminal stenosis from L3 to L5. Xray LS flex/ Ext: 1/10/2022:      Impression   No instability evident. Intact lumbosacral fusion hardware. Unchanged grade   2 anterolisthesis of L4 on L5. See above. Xray Cervical spine: 10/20/2021:  FINDINGS:   All 7 cervical vertebrae are visualized and appear normal in height and   alignment. There is no evidence of prevertebral soft tissue edema or   fracture. The base of the odontoid appears intact. Impression   No acute abnormality of the cervical spine. Xray LS spine: 10/20/2021:  Impression   1. 1 cm anterolisthesis of L4 on L5 which is new when compared with the prior   CT scan of 04/14/2015. There is likely associated spinal canal stenosis   secondary to the listhesis. 2. Previous transpedicular fusion of L5 and S1. There is no hardware   complication.              Past Medical History:   Diagnosis Date    Chronic back pain     Chronic kidney disease     stage 3  follows with nephrology    Dog bite of thumb     right    Low back pain        Past Surgical History:   Procedure Laterality Date    CARPAL TUNNEL RELEASE Bilateral     CHOLECYSTECTOMY      HAND SURGERY Right 4/13/2022    RIGHT THUMB EXPLORATION WITH EXCISIONAL DEBRIDEMENT and NEUROLYSIS performed by Radha Littlejohn MD at [address]  02/01/2013    PLIF  L5 - S1    NOSE SURGERY      four nose surgeries   ( d/t a fractures)    PAIN MANAGEMENT PROCEDURE N/A 02/01/2022    LUMBAR TRANSFORAMINAL EPIDURAL STEROID INJECTION LEFT L3 &RIGHT L4 UNDER XRAY GUIDANCE (19773) performed by Sindhu Bryson MD at [facility] N/A 8/2/2022    SPINAL CORD STIMULATOR TRIAL WITH BOSTON SCIENTIFIC performed by Sindhu Bryson MD at [address] [facility] Right 02/09/2018    ROTATOR CUFF REPAIR Right 2008    R rotator cuff repair    SHOULDER ARTHROSCOPY Right 02/21/2020    RIGHT SHOULDER ARTHROSCOPY. SUBACROMIAL DECOMPRESSION, LABRAL AND BICEP  DEBRIDEMENT, ROTATOR CUFF REPAIR performed by Dante Carlton DO at [address] ARTHROSCOPY Left 11/12/2021    LEFT SHOULDER ARTHROSCOPY, SUBACROMIAL DECOMPRESSION,  ROTATOR CUFF REPAIR  (CPT 58448, 87518) Denia Yeimy) performed by Dante Carlton DO at [facility]       Prior to Admission medications    Medication Sig Start Date End Date Taking? Authorizing Provider   clindamycin (CLEOCIN) 300 MG capsule Take 1 capsule by mouth in the morning and 1 capsule at noon and 1 capsule before bedtime. Do all this for 10 days. 8/2/22 8/12/22 Yes Sindhu Bryson MD   oxyCODONE-acetaminophen (PERCOCET) 5-325 MG per tablet Take 1 tablet by mouth every 8 hours as needed for Pain (at the procedure site) for up to 7 days. 8/2/22 8/9/22 Yes Sindhu Bryson MD   ibuprofen (ADVIL;MOTRIN) 200 MG tablet Take by mouth every 6 hours as needed for Pain   Yes Historical Provider, MD   gabapentin (NEURONTIN) 600 MG tablet TAKE 1 TABLET BY MOUTH 2 TIMES DAILY AS NEEDED FOR PAIN 7/7/22 3/10/23 Yes Angi Devlin MD   traZODone (DESYREL) 150 MG tablet Take one tab po nightly 5/3/22  Yes Srinath Joy DO       Allergies   Allergen Reactions    Penicillin G Hives and Nausea And Vomiting       Social History     Socioeconomic History    Marital status:       Spouse name: Not on file    Number of children: Not on file    Years of education: Not on file    Highest education level: Not on file   Occupational History    Not on file   Tobacco Blood Pressure Father     Arthritis Father     Stroke Father     Diabetes Sister     Arthritis Maternal Uncle     Diabetes Maternal Uncle     Kidney Disease Maternal Uncle     Arthritis Maternal Grandmother        REVIEW OF SYSTEMS:     Radsalomona Prom denies fever/chills, chest pain, shortness of breath, new bowel or bladder complaints. All other review of systems was negative. PHYSICAL EXAMINATION:      /62   Pulse 65   Temp 97.1 °F (36.2 °C)   Resp 16   Ht 5' (1.524 m)   Wt 158 lb (71.7 kg)   LMP 04/15/2015   SpO2 98%   BMI 30.86 kg/m²     General appearance:A & O  X3       HEENT:     Head:normocephalic, atraumatic     Lungs:     Breathing:normal breathing pattern     CVS:     RRR     Abdomen:     Shape:non-distended and normal     Cervical spine:     Inspection:normal     Thoracic spine:                Spine inspection:normal     Lumbar spine:     Spine inspection: Scar from the prior surgery- healed well  Palpation: Tenderness paravertebral muscles Yes bilaterally  Range of motion: Decreased,  Sacroiliac joint tenderness Yes bilaterally  SLR : negative bilaterally    SCS lead insertion sign CDI-sutures removed in sterile manner Band-Aid applied-lead tip intact      Extremities:     Tremors:None bilaterally upper and lower  Edema:no  Distal LE peripheral pulses- well felt     Neurological:     Sensory: Normal to light touch     Motor:  No new focal deficits      Gait:normal Yes     Dermatology:     Skin:no rashes or lesions noted     Assessment/Plan:       Diagnosis Orders   1. Postlaminectomy syndrome, lumbar     2. DDD (degenerative disc disease), lumbar     3. Lumbar radicular pain     4. Lumbosacral spondylosis without myelopathy            55 y.o.  female with H/o  low back & b/l LE pain. Chronic low back pain: Prior H/o L5-S1 PLIF in 2013 by Dr Soraya Lozano. Has low back / LE pain. Prior interventions and pain meds at different pain center. Does not like pain meds and has discontinued.

## 2022-08-18 ENCOUNTER — PREP FOR PROCEDURE (OUTPATIENT)
Dept: PAIN MANAGEMENT | Age: 57
End: 2022-08-18

## 2022-08-19 ENCOUNTER — PREP FOR PROCEDURE (OUTPATIENT)
Dept: PAIN MANAGEMENT | Age: 57
End: 2022-08-19

## 2022-08-19 RX ORDER — SODIUM CHLORIDE, SODIUM LACTATE, POTASSIUM CHLORIDE, CALCIUM CHLORIDE 600; 310; 30; 20 MG/100ML; MG/100ML; MG/100ML; MG/100ML
INJECTION, SOLUTION INTRAVENOUS CONTINUOUS
Status: CANCELLED | OUTPATIENT
Start: 2022-08-19

## 2022-08-28 ENCOUNTER — ANESTHESIA EVENT (OUTPATIENT)
Dept: OPERATING ROOM | Age: 57
End: 2022-08-28
Payer: COMMERCIAL

## 2022-08-28 ASSESSMENT — LIFESTYLE VARIABLES: SMOKING_STATUS: 1

## 2022-08-28 NOTE — ANESTHESIA PRE PROCEDURE
E89.2    Insomnia G47.00    Stage 3 chronic kidney disease ([facility]) N18.30    Shoulder impingement, left M75.42    Arthritis of right acromioclavicular joint M19.011    Shoulder impingement, right M75.41    CKD (chronic kidney disease) stage 4, GFR 15-29 ml/min ([facility]) N18.4    Postprocedural hypoparathyroidism ([facility]) E89.2    Traumatic complete tear of right rotator cuff S46.011A    Impingement syndrome of right shoulder M75.41    Tear of right glenoid labrum S43.431A    Traumatic complete tear of left rotator cuff S46.012A    Impingement syndrome of left shoulder M75.42    Tear of left glenoid labrum S43.432A    Biceps tendon tear S46.219A    Lumbar radiculopathy M54.16    Post-operative pain G89.18    Injury of digital nerve of right thumb S64. 31XA       Past Medical History:        Diagnosis Date    Chronic back pain     Chronic kidney disease     stage 3  follows with nephrology    Dog bite of thumb     right    Low back pain        Past Surgical History:        Procedure Laterality Date    CARPAL TUNNEL RELEASE Bilateral     CHOLECYSTECTOMY      HAND SURGERY Right 4/13/2022    RIGHT THUMB EXPLORATION WITH EXCISIONAL DEBRIDEMENT and NEUROLYSIS performed by [facility]rice [facility]za [facility]  02/01/2013    PLIF  L5 - S1    NOSE SURGERY      four nose surgeries   ( d/t a fractures)    PAIN MANAGEMENT PROCEDURE N/A 02/01/2022    LUMBAR TRANSFORAMINAL EPIDURAL STEROID INJECTION LEFT L3 &RIGHT L4 UNDER XRAY GUIDANCE (87968) performed by Sharon Holt MD at [address] [facility] [address] N/A 8/2/2022    SPINAL CORD STIMULATOR TRIAL WITH BOSTON SCIENTIFIC performed by Sharon Holt MD at [address] [facility] Right 02/09/2018    ROTATOR CUFF REPAIR Right 2008    R rotator cuff repair    SHOULDER ARTHROSCOPY Right 02/21/2020    RIGHT SHOULDER ARTHROSCOPY.  SUBACROMIAL DECOMPRESSION, LABRAL AND BICEP  DEBRIDEMENT, ROTATOR CUFF REPAIR performed by Leah Pak DO at [address] [facility] ARTHROSCOPY Left 11/12/2021    LEFT SHOULDER ARTHROSCOPY, SUBACROMIAL DECOMPRESSION,  ROTATOR CUFF REPAIR  (CPT 90932, 13889) Santi Monaco) performed by Leah Pak DO at [address] History:    Social History     Tobacco Use    Smoking status: Every Day     Packs/day: 0.25     Years: 10.00     Pack years: 2.50     Types: Cigarettes     Start date: 11/24/2016    Smokeless tobacco: Never    Tobacco comments:     3 cigarettes per day   Substance Use Topics    Alcohol use: No                                Ready to quit: Not Answered  Counseling given: Not Answered  Tobacco comments: 3 cigarettes per day      Vital Signs (Current):   Vitals:    08/25/22 1327 08/30/22 0754   BP:  117/80   Pulse:  56   Resp:  16   Temp:  97 °F (36.1 °C)   TempSrc:  Temporal   SpO2:  95%   Weight: 157 lb (71.2 kg) 160 lb 2 oz (72.6 kg)   Height: 5' (1.524 m)                                               BP Readings from Last 3 Encounters:   08/30/22 117/80   08/09/22 118/62   08/02/22 128/74       NPO Status: Time of last liquid consumption: 2100                        Time of last solid consumption: 2100                        Date of last liquid consumption: 08/29/22                        Date of last solid food consumption: 08/29/22    BMI:   Wt Readings from Last 3 Encounters:   08/30/22 160 lb 2 oz (72.6 kg)   08/09/22 158 lb (71.7 kg)   08/02/22 158 lb (71.7 kg)     Body mass index is 31.27 kg/m².     CBC:   Lab Results   Component Value Date/Time    WBC 5.7 06/21/2022 01:23 PM    RBC 5.13 06/21/2022 01:23 PM    HGB 14.7 06/21/2022 01:23 PM    HCT 42.8 06/21/2022 01:23 PM    MCV 83.4 06/21/2022 01:23 PM    RDW 12.5 06/21/2022 01:23 PM     06/21/2022 01:23 PM       CMP:   Lab Results   Component Value Date/Time     06/21/2022 01:23 PM    K 4.3 06/21/2022 01:23 PM     06/21/2022 01:23 PM    CO2 27 06/21/2022 01:23 PM    BUN 12 06/21/2022 01:23 PM    CREATININE 1.4 06/21/2022 01:23 PM    GFRAA 47 06/21/2022 01:23 PM    LABGLOM 39 06/21/2022 01:23 PM    GLUCOSE 102 06/21/2022 01:23 PM    PROT 7.1 06/21/2022 01:23 PM    CALCIUM 9.7 06/21/2022 01:23 PM    BILITOT 0.7 06/21/2022 01:23 PM    ALKPHOS 77 06/21/2022 01:23 PM    AST 18 06/21/2022 01:23 PM    ALT 9 06/21/2022 01:23 PM       POC Tests: No results for input(s): POCGLU, POCNA, POCK, POCCL, POCBUN, POCHEMO, POCHCT in the last 72 hours. Coags:   Lab Results   Component Value Date/Time    PROTIME 10.5 04/14/2015 10:20 AM    PROTIME 11.9 12/01/2010 03:45 PM    INR 1.0 04/14/2015 10:20 AM    APTT 29.3 12/01/2010 03:45 PM       HCG (If Applicable):   Lab Results   Component Value Date    PREGTESTUR NEGATIVE 04/30/2014        ABGs: No results found for: PHART, PO2ART, OSN9GMW, HEW1TNG, BEART, T0HRUBFM     Type & Screen (If Applicable):  No results found for: [facility]    Drug/Infectious Status (If Applicable):  Lab Results   Component Value Date/Time    HEPCAB NON REACT 11/27/2012 10:55 AM       COVID-19 Screening (If Applicable): No results found for: COVID19        Anesthesia Evaluation  Patient summary reviewed  Airway: Mallampati: II  TM distance: >3 FB   Neck ROM: full  Mouth opening: > = 3 FB   Dental: normal exam         Pulmonary:normal exam  breath sounds clear to auscultation  (+) current smoker                           Cardiovascular:Negative CV ROS  Exercise tolerance: good (>4 METS),         ECG reviewed  Rhythm: regular  Rate: normal    Stress test reviewed       Beta Blocker:  Not on Beta Blocker      ROS comment: EKG:  Normal sinus rhythm  Normal ECG  No previous ECGs available    STRESS 2020: 1. Exercise EKG was negative for ischemia at submaximal workload (81% MPHR). 2.The patient experienced no chest pain with exercise. 3.Pittman treadmill score was +9.5 implying low risk. 4.Exercise capacity was above average. 5. Low risk general exercise treadmill test with the caveat of not achieving 85% MPHR. Cleared by PCP     Neuro/Psych:   (+) neuromuscular disease (Neuropathy):,              ROS comment: Displacement of lumbar intervertebral disc without myelopathy  Spinal stenosis of lumbar region  S/P lumbar fusion (PLIF) L5-S1  Bilateral leg pain  DDD (degenerative disc disease), lumbar  Chronic back pain  Chronic pain  Lumbar post-laminectomy syndrome  Neural foraminal stenosis of lumbar spine     GI/Hepatic/Renal: Neg GI/Hepatic/Renal ROS  (+) renal disease (Stage IV CKD:  creatinine 1.4 & GFR 39): CRI,           Endo/Other:    (+) : arthritis (Lumbar radiculopathy): OA., . Pt had no PAT visit        ROS comment: Postprocedural hypoparathyroidism Abdominal:             Vascular: negative vascular ROS. Other Findings:             Anesthesia Plan      MAC     ASA 3     (PCP clearance on chart)  Induction: intravenous. MIPS: Postoperative opioids intended and Prophylactic antiemetics administered. Anesthetic plan and risks discussed with patient. Plan discussed with CRNA. PAT Chart Review:  Chart reviewed per routine on August 28, 2022 at 8:57 AM by Valerie Hernandez MD.  (Final assessment and plan per day of surgery team.)    DOS STAFF ADDENDUM:    Pt seen and examined, chart reviewed (including anesthesia, drug and allergy history). Anesthetic plan, risks, benefits, alternatives, and personnel involved discussed with patient. Patient verbalized an understanding and agrees to proceed. Plan discussed with care team members and agreed upon.     Adebayo Ames MD  Staff Anesthesiologist  8:16 AM    Adebayo Ames MD   8/30/2022

## 2022-08-29 ENCOUNTER — TELEPHONE (OUTPATIENT)
Dept: PAIN MANAGEMENT | Age: 57
End: 2022-08-29

## 2022-08-29 NOTE — TELEPHONE ENCOUNTER
Message from Dr. Wayne Lema, he would like the patient to take a shower with hibiclens tonight the night before her procedure, and tomorrow morning, the morning of her procedure. Patient aware, states that she has some at home and she will do that.     Theresa Can RN  Pain Management

## 2022-08-30 ENCOUNTER — HOSPITAL ENCOUNTER (OUTPATIENT)
Dept: OPERATING ROOM | Age: 57
Setting detail: OUTPATIENT SURGERY
Discharge: HOME | End: 2022-08-30
Attending: ANESTHESIOLOGY
Payer: COMMERCIAL

## 2022-08-30 ENCOUNTER — HOSPITAL ENCOUNTER (OUTPATIENT)
Age: 57
Setting detail: OUTPATIENT SURGERY
Discharge: HOME | End: 2022-08-30
Attending: ANESTHESIOLOGY | Admitting: ANESTHESIOLOGY
Payer: COMMERCIAL

## 2022-08-30 ENCOUNTER — ANESTHESIA (OUTPATIENT)
Dept: OPERATING ROOM | Age: 57
End: 2022-08-30
Payer: COMMERCIAL

## 2022-08-30 VITALS
BODY MASS INDEX: 31.44 KG/M2 | RESPIRATION RATE: 16 BRPM | WEIGHT: 160.13 LBS | OXYGEN SATURATION: 99 % | SYSTOLIC BLOOD PRESSURE: 132 MMHG | HEART RATE: 66 BPM | DIASTOLIC BLOOD PRESSURE: 76 MMHG | TEMPERATURE: 96.8 F | HEIGHT: 60 IN

## 2022-08-30 DIAGNOSIS — G89.18 POST-OPERATIVE PAIN: Primary | ICD-10-CM

## 2022-08-30 DIAGNOSIS — Z96.89 S/P INSERTION OF SPINAL CORD STIMULATOR: ICD-10-CM

## 2022-08-30 DIAGNOSIS — M96.1 LUMBAR POST-LAMINECTOMY SYNDROME: Chronic | ICD-10-CM

## 2022-08-30 PROCEDURE — 6370000000 HC RX 637 (ALT 250 FOR IP): Performed by: ANESTHESIOLOGY

## 2022-08-30 PROCEDURE — 63650 IMPLANT NEUROELECTRODES: CPT | Performed by: ANESTHESIOLOGY

## 2022-08-30 PROCEDURE — C1820 GENERATOR NEURO RECHG BAT SY: HCPCS | Performed by: ANESTHESIOLOGY

## 2022-08-30 PROCEDURE — 63685 INS/RPLC SPI NPG/RCVR POCKET: CPT | Performed by: ANESTHESIOLOGY

## 2022-08-30 PROCEDURE — 6360000002 HC RX W HCPCS: Performed by: NURSE ANESTHETIST, CERTIFIED REGISTERED

## 2022-08-30 PROCEDURE — 2720000010 HC SURG SUPPLY STERILE: Performed by: ANESTHESIOLOGY

## 2022-08-30 PROCEDURE — A4217 STERILE WATER/SALINE, 500 ML: HCPCS | Performed by: ANESTHESIOLOGY

## 2022-08-30 PROCEDURE — C1778 LEAD, NEUROSTIMULATOR: HCPCS | Performed by: ANESTHESIOLOGY

## 2022-08-30 PROCEDURE — 3209999900 FLUORO FOR SURGICAL PROCEDURES

## 2022-08-30 PROCEDURE — 2580000003 HC RX 258: Performed by: ANESTHESIOLOGY

## 2022-08-30 PROCEDURE — 2709999900 HC NON-CHARGEABLE SUPPLY: Performed by: ANESTHESIOLOGY

## 2022-08-30 PROCEDURE — C1713 ANCHOR/SCREW BN/BN,TIS/BN: HCPCS | Performed by: ANESTHESIOLOGY

## 2022-08-30 PROCEDURE — 7100000010 HC PHASE II RECOVERY - FIRST 15 MIN: Performed by: ANESTHESIOLOGY

## 2022-08-30 PROCEDURE — 3600000015 HC SURGERY LEVEL 5 ADDTL 15MIN: Performed by: ANESTHESIOLOGY

## 2022-08-30 PROCEDURE — 3600000005 HC SURGERY LEVEL 5 BASE: Performed by: ANESTHESIOLOGY

## 2022-08-30 PROCEDURE — 6360000002 HC RX W HCPCS: Performed by: PHYSICIAN ASSISTANT

## 2022-08-30 PROCEDURE — 3700000001 HC ADD 15 MINUTES (ANESTHESIA): Performed by: ANESTHESIOLOGY

## 2022-08-30 PROCEDURE — 2500000003 HC RX 250 WO HCPCS: Performed by: NURSE ANESTHETIST, CERTIFIED REGISTERED

## 2022-08-30 PROCEDURE — 2500000003 HC RX 250 WO HCPCS: Performed by: ANESTHESIOLOGY

## 2022-08-30 PROCEDURE — 3700000000 HC ANESTHESIA ATTENDED CARE: Performed by: ANESTHESIOLOGY

## 2022-08-30 PROCEDURE — 7100000011 HC PHASE II RECOVERY - ADDTL 15 MIN: Performed by: ANESTHESIOLOGY

## 2022-08-30 PROCEDURE — 2580000003 HC RX 258: Performed by: PHYSICIAN ASSISTANT

## 2022-08-30 DEVICE — 50CM 16 CONTACT LEAD KIT
Type: IMPLANTABLE DEVICE | Status: FUNCTIONAL
Brand: INFINION™ CX

## 2022-08-30 DEVICE — ANCHOR
Type: IMPLANTABLE DEVICE | Site: SPINE LUMBAR | Status: FUNCTIONAL
Brand: CLIK™ X

## 2022-08-30 DEVICE — IMPLANTABLE DEVICE: Type: IMPLANTABLE DEVICE | Site: SPINE LUMBAR | Status: FUNCTIONAL

## 2022-08-30 DEVICE — STIMULATOR WAVEWRITER ALPHA IMPL PULSE GENRTR KIT: Type: IMPLANTABLE DEVICE | Status: FUNCTIONAL

## 2022-08-30 RX ORDER — MORPHINE SULFATE 2 MG/ML
2 INJECTION, SOLUTION INTRAMUSCULAR; INTRAVENOUS EVERY 5 MIN PRN
Status: DISCONTINUED | OUTPATIENT
Start: 2022-08-30 | End: 2022-08-30 | Stop reason: HOSPADM

## 2022-08-30 RX ORDER — MIDAZOLAM HYDROCHLORIDE 1 MG/ML
INJECTION INTRAMUSCULAR; INTRAVENOUS PRN
Status: DISCONTINUED | OUTPATIENT
Start: 2022-08-30 | End: 2022-08-30 | Stop reason: SDUPTHER

## 2022-08-30 RX ORDER — CLINDAMYCIN HYDROCHLORIDE 300 MG/1
300 CAPSULE ORAL 3 TIMES DAILY
Qty: 30 CAPSULE | Refills: 0 | Status: SHIPPED | OUTPATIENT
Start: 2022-08-30 | End: 2022-09-09 | Stop reason: SDUPTHER

## 2022-08-30 RX ORDER — EPHEDRINE SULFATE/0.9% NACL/PF 50 MG/5 ML
SYRINGE (ML) INTRAVENOUS PRN
Status: DISCONTINUED | OUTPATIENT
Start: 2022-08-30 | End: 2022-08-30 | Stop reason: SDUPTHER

## 2022-08-30 RX ORDER — FENTANYL CITRATE 50 UG/ML
INJECTION, SOLUTION INTRAMUSCULAR; INTRAVENOUS PRN
Status: DISCONTINUED | OUTPATIENT
Start: 2022-08-30 | End: 2022-08-30 | Stop reason: SDUPTHER

## 2022-08-30 RX ORDER — ONDANSETRON 2 MG/ML
4 INJECTION INTRAMUSCULAR; INTRAVENOUS
Status: DISCONTINUED | OUTPATIENT
Start: 2022-08-30 | End: 2022-08-30 | Stop reason: HOSPADM

## 2022-08-30 RX ORDER — CLINDAMYCIN PHOSPHATE 900 MG/50ML
900 INJECTION INTRAVENOUS ONCE
Status: DISCONTINUED | OUTPATIENT
Start: 2022-08-30 | End: 2022-08-30 | Stop reason: HOSPADM

## 2022-08-30 RX ORDER — LABETALOL HYDROCHLORIDE 5 MG/ML
10 INJECTION, SOLUTION INTRAVENOUS
Status: DISCONTINUED | OUTPATIENT
Start: 2022-08-30 | End: 2022-08-30 | Stop reason: HOSPADM

## 2022-08-30 RX ORDER — SODIUM CHLORIDE, SODIUM LACTATE, POTASSIUM CHLORIDE, CALCIUM CHLORIDE 600; 310; 30; 20 MG/100ML; MG/100ML; MG/100ML; MG/100ML
INJECTION, SOLUTION INTRAVENOUS CONTINUOUS
Status: DISCONTINUED | OUTPATIENT
Start: 2022-08-30 | End: 2022-08-30 | Stop reason: HOSPADM

## 2022-08-30 RX ORDER — IPRATROPIUM BROMIDE AND ALBUTEROL SULFATE 2.5; .5 MG/3ML; MG/3ML
1 SOLUTION RESPIRATORY (INHALATION)
Status: DISCONTINUED | OUTPATIENT
Start: 2022-08-30 | End: 2022-08-30 | Stop reason: HOSPADM

## 2022-08-30 RX ORDER — PROPOFOL 10 MG/ML
INJECTION, EMULSION INTRAVENOUS CONTINUOUS PRN
Status: DISCONTINUED | OUTPATIENT
Start: 2022-08-30 | End: 2022-08-30 | Stop reason: SDUPTHER

## 2022-08-30 RX ORDER — MIDAZOLAM HYDROCHLORIDE 1 MG/ML
2 INJECTION INTRAMUSCULAR; INTRAVENOUS
Status: DISCONTINUED | OUTPATIENT
Start: 2022-08-30 | End: 2022-08-30 | Stop reason: HOSPADM

## 2022-08-30 RX ORDER — OXYCODONE HYDROCHLORIDE AND ACETAMINOPHEN 5; 325 MG/1; MG/1
1 TABLET ORAL EVERY 6 HOURS PRN
Qty: 28 TABLET | Refills: 0 | Status: SHIPPED | OUTPATIENT
Start: 2022-08-30 | End: 2022-09-06

## 2022-08-30 RX ORDER — FENTANYL CITRATE 0.05 MG/ML
25 INJECTION, SOLUTION INTRAMUSCULAR; INTRAVENOUS EVERY 5 MIN PRN
Status: DISCONTINUED | OUTPATIENT
Start: 2022-08-30 | End: 2022-08-30 | Stop reason: HOSPADM

## 2022-08-30 RX ORDER — VANCOMYCIN HYDROCHLORIDE 1 G/20ML
INJECTION, POWDER, LYOPHILIZED, FOR SOLUTION INTRAVENOUS
Status: DISCONTINUED
Start: 2022-08-30 | End: 2022-08-30 | Stop reason: HOSPADM

## 2022-08-30 RX ORDER — PROPOFOL 10 MG/ML
INJECTION, EMULSION INTRAVENOUS PRN
Status: DISCONTINUED | OUTPATIENT
Start: 2022-08-30 | End: 2022-08-30

## 2022-08-30 RX ORDER — HYDRALAZINE HYDROCHLORIDE 20 MG/ML
10 INJECTION INTRAMUSCULAR; INTRAVENOUS
Status: DISCONTINUED | OUTPATIENT
Start: 2022-08-30 | End: 2022-08-30 | Stop reason: HOSPADM

## 2022-08-30 RX ORDER — GINSENG 100 MG
CAPSULE ORAL PRN
Status: DISCONTINUED | OUTPATIENT
Start: 2022-08-30 | End: 2022-08-30 | Stop reason: ALTCHOICE

## 2022-08-30 RX ORDER — CLINDAMYCIN PHOSPHATE 600 MG/50ML
600 INJECTION INTRAVENOUS ONCE
Status: DISCONTINUED | OUTPATIENT
Start: 2022-08-30 | End: 2022-08-30

## 2022-08-30 RX ORDER — DIPHENHYDRAMINE HYDROCHLORIDE 50 MG/ML
12.5 INJECTION INTRAMUSCULAR; INTRAVENOUS
Status: DISCONTINUED | OUTPATIENT
Start: 2022-08-30 | End: 2022-08-30 | Stop reason: HOSPADM

## 2022-08-30 RX ORDER — PROCHLORPERAZINE EDISYLATE 5 MG/ML
5 INJECTION INTRAMUSCULAR; INTRAVENOUS
Status: DISCONTINUED | OUTPATIENT
Start: 2022-08-30 | End: 2022-08-30 | Stop reason: HOSPADM

## 2022-08-30 RX ADMIN — PROPOFOL INJECTABLE EMULSION 75 MCG/KG/MIN: 10 INJECTION, EMULSION INTRAVENOUS at 08:38

## 2022-08-30 RX ADMIN — FENTANYL CITRATE 50 MCG: 50 INJECTION, SOLUTION INTRAMUSCULAR; INTRAVENOUS at 10:07

## 2022-08-30 RX ADMIN — FENTANYL CITRATE 50 MCG: 50 INJECTION, SOLUTION INTRAMUSCULAR; INTRAVENOUS at 08:42

## 2022-08-30 RX ADMIN — Medication 10 MG: at 09:38

## 2022-08-30 RX ADMIN — FENTANYL CITRATE 50 MCG: 50 INJECTION, SOLUTION INTRAMUSCULAR; INTRAVENOUS at 08:38

## 2022-08-30 RX ADMIN — Medication 10 MG: at 10:34

## 2022-08-30 RX ADMIN — VANCOMYCIN HYDROCHLORIDE 1000 MG: 1 INJECTION, POWDER, LYOPHILIZED, FOR SOLUTION INTRAVENOUS at 08:30

## 2022-08-30 RX ADMIN — FENTANYL CITRATE 50 MCG: 50 INJECTION, SOLUTION INTRAMUSCULAR; INTRAVENOUS at 10:05

## 2022-08-30 RX ADMIN — FENTANYL CITRATE 50 MCG: 50 INJECTION, SOLUTION INTRAMUSCULAR; INTRAVENOUS at 09:12

## 2022-08-30 RX ADMIN — MIDAZOLAM 2 MG: 1 INJECTION INTRAMUSCULAR; INTRAVENOUS at 08:35

## 2022-08-30 RX ADMIN — SODIUM CHLORIDE, POTASSIUM CHLORIDE, SODIUM LACTATE AND CALCIUM CHLORIDE: 600; 310; 30; 20 INJECTION, SOLUTION INTRAVENOUS at 08:21

## 2022-08-30 ASSESSMENT — PAIN SCALES - GENERAL
PAINLEVEL_OUTOF10: 0
PAINLEVEL_OUTOF10: 0

## 2022-08-30 ASSESSMENT — PAIN - FUNCTIONAL ASSESSMENT: PAIN_FUNCTIONAL_ASSESSMENT: NONE - DENIES PAIN

## 2022-08-30 NOTE — OP NOTE
Operative Note      Patient: Jaime Jackson  YOB: 1965  MRN: [id number]    Date of Procedure: 2022    Pre-Op Diagnosis: Lumbar postlaminectomy syndrome Lumbar radiculopathy [M54.16]    Post-Op Diagnosis: Same       Procedure(s):  SPINAL CORD STIMULATOR Leads and IPG IMPLANT WITH [employer]    Surgeon(s):  Lilian Ceja MD    Assistant:   * No surgical staff found *    Anesthesia: Monitor Anesthesia Care    Estimated Blood Loss (mL): less than 20    Complications: None    Specimens:   * No specimens in log *    Implants:  Implant Name Type Inv. Item Serial No.  Lot No. LRB No. Used Action   ANCHOR NEUROSTIMULATOR L4CM FOR SPNL CRD STIM SYS CLIK X - [id number]  ANCHOR NEUROSTIMULATOR L4CM FOR SPNL CRD STIM SYS CLIK X  [employer]x- [id number] N/A 1 Implanted   KIT LD L50CM 16 CNTCT INFINION CX - [id number]  KIT LD L50CM 16 CNTCT INFINION CX [id number] [employer]UROM[employer]- [id number] N/A 1 Implanted   LEAD NEUROSTIM L50CM PERC 16 CNTCT KT INFINION CX - [id number]  LEAD NEUROSTIM L50CM PERC 16 CNTCT KT INFINION CX [id number] [facility]  N/A 1 Implanted   [id number] - [id number]  IMPLANTABLE PULSE GENERATOR KIT   [id number]  [id number] Right 1 Implanted         Drains: * No LDAs found *    Findings: good lead placement    Detailed Description of Procedure:   2022    Patient: Jaime Jackson  :  1965  Age:  55 y.o. Sex:  female     PRE-OPERATIVE DIAGNOSIS: Lumbar postlaminectomy syndrome. , lumbar radicular pain. POST-OPERATIVE DIAGNOSIS: Same. PROCEDURE: Fluoroscopic guided spinal cord stimulator implant. COMPANY: [employer]    SURGEON: Lilian Ceja MD    ANESTHESIA: MAC    ESTIMATED BLOOD LOSS: None.  ______________________________________________________________________    BRIEF HISTORY: Jaime Jackson comes in today for spinal cord stimulator implant under fluoroscopic guidance.  The potential complications of this procedure were discussed with her again today. She has elected to undergo the aforementioned procedure. Yasemin complete History & Physical examination were reviewed in depth, a copy of which is in the chart. DESCRIPTION OF PROCEDURE:    After confirming written and informed consent, a time-out was performed and Yasemin name and date of birth, the procedure to be performed as well as the plan for the location of the needle insertion were confirmed. The patient was brought into the procedure room and placed in the prone position on the fluoroscopy table. Preop antibiotics was givne by the anesthesia team. A pillow was placed under the patient's abdomen to increase lumbar interlaminar space. Standard monitors were placed, and vital signs were observed throughout the procedure. The area of the thoracic and lumbar spine was prepped with chloraprep and draped in a sterile manner. The lead entry L1-2 interspace was identified and marked under AP fluoroscopy. The skin and subcutaneous tissues at the above level were anesthestized. An incision was marked along the spine, which would allow exposure of the needle(s). The skin incision was made with a 10 blade scalpel and then extended to the supraspiantous ligament using electrocautery. TWO # 14 gauge epidural needle  which was in the kit was advanced with a paramedian approach with loss of resistance technique with a glass syringe and air to identify the entrance into the epidural space. Once a good loss of resistance was obtained, negative aspiration was confirmed. TWO 16 contact  leads was advanced under fluoroscopic guidance until the lead tip position of the lead was at the Top of T7 level. The leads were connected to an external pulse generator using sterile connectors. Several complex combinations of electrode configuration, frequency, amplitude and pulse width were used until comfortable, appropriate coverage of the patient pain area was obtained.  The needle(s) were then carefully removed and the lead(s) were secured to the supraspinatus ligament with click anchors and 2-0 Nylon sutures. Fluoroscopy was used to confirm continued proper placement of the lead(s) after being anchored. A site was chosen for placement of the IPG unit in the Right flank. An incision was marked. Then the skin and subcutaneous tissues in the area were anesthetized local anesthetic. A # 10 blade scalpel was used for skin incision. The incision was extended with electrocautery about 1.5 cm deep into the subcutaneous tissue. Blunt dissection was used to create an appropriately sized pocket for the internal pulse generator. Hemostasis was obtained with electrocautery. At this point, the tunneling tool was used to form a tunnel from the entry point to the IPG pocket and the lead was ran through. Once in the pocket, the lead was connected to the IPG and impedence was checked one more time. Copious irrigation was used to both incisions pockets until clear fluid was noted in the suction tubing. The IPG was internalized and secured with # 2-0 silk . Subcutaneous and Skin closure was performed using 2-0 vicryl and 3-0 Vicryl  sutures . [place] was used to close skin. The incisions were covered with sterile dressing. Disposition the patient tolerated the procedure well and there were no complications . Vital signs remained stable throughout the procedure. The patient was escorted to the recovery area where they remained until discharge and written discharge instructions for the procedure were given. Plan: Cirilo Peguero will return to our pain management center as scheduled.      Brooke Marks MD

## 2022-08-30 NOTE — INTERVAL H&P NOTE
Update History & Physical    The patient's History and Physical of August 9, 2022 was reviewed with the patient and I examined the patient. There was no change. The surgical site was confirmed by the patient and me. Plan: The risks, benefits, expected outcome, and alternative to the recommended procedure have been discussed with the patient. Patient understands and wants to proceed with the procedure.      Electronically signed by Cristal Polanco MD

## 2022-08-30 NOTE — DISCHARGE INSTRUCTIONS
[address]  Dr. Master Vargas  [phone]    Spinal Cord Stimulator Implant  Discharge Instructions    -No bending,twisting, stretching,heavy lifting until further instructed by physician  -No upright sitting for one week. Slight incline only  -Keep dressing clean, dry and intact do not remove. Sponge bathe only for 5 days  -Resume regular diet  -DO NOT RESUME BLOOD THINNERS TILL SEEN BY PHYSCIAN  -Resume other normal medications  -NO DRIVING FOR STIMULATOR IMPLANTS TILL SEEN BY DOCTOR  -Follow up with physician in the office at scheduled appointment, or call the office to schedule an appointment if needed  -If wearing a binder please keep it on till follow up visit with Doctor. Infection After Surgery: Care Instructions  Overview  After surgery, an infection is always possible. It doesn't mean that thesurgery didn't go well. Because an infection can be serious, your doctor has taken steps to manage it. Your doctor checked the infection and cleaned it if necessary. Your doctor may have made an opening in the area so that the pus can drain out. You may have gauze in the cut so that the area will stay open and keep draining. You mayneed antibiotics. You will need to follow up with your doctor to make sure the infection has goneaway. Follow-up care is a key part of your treatment and safety. Be sure to make and go to all appointments, and call your doctor if you are having problems. It's also a good idea to know your test results and keep alist of the medicines you take. How can you care for yourself at home? Make sure your surgeon knows about the infection, especially if you saw another doctor about your symptoms. If your doctor prescribed antibiotics, take them as directed. Do not stop taking them just because you feel better. You need to take the full course of antibiotics.   Ask your doctor if you can take an over-the-counter pain medicine, such as acetaminophen (Tylenol), ibuprofen (Advil, Motrin), or naproxen (Aleve). Be safe with medicines. Read and follow all instructions on the label. Do not take two or more pain medicines at the same time unless the doctor told you to. Many pain medicines have acetaminophen, which is Tylenol. Too much acetaminophen (Tylenol) can be harmful. Prop up the area on a pillow anytime you sit or lie down during the next 3 days. Try to keep it above the level of your heart. This will help reduce swelling. Keep the skin clean and dry. You may have a bandage over the cut (incision). A bandage helps the incision heal and protects it. Your doctor will tell you how to take care of this. Keep it clean and dry. You may have drainage from the wound. If your doctor told you how to care for your incision, follow your doctor's instructions. If you did not get instructions, follow this general advice:  Wash around the incision with clean water 2 times a day. Don't use hydrogen peroxide or alcohol, which can slow healing. When should you call for help? Call your doctor now or seek immediate medical care if:    You have signs that your infection is getting worse, such as: Increased pain, swelling, warmth, or redness in the area. Red streaks leading from the area. Pus draining from the wound. A new or higher fever. Watch closely for changes in your health, and be sure to contact your doctor ifyou have any problems. Nausea and Vomiting After Surgery: Care Instructions  Your Care Instructions     After you've had surgery, you may feel sick to your stomach (nauseated) or you may vomit. Sometimes anesthesia can make you feel sick. It's a common side effect and often doesn't last long. Pain also can make you feel sick or vomit. After the anesthesia wears off, you may feel pain from the incision (cut). That pain could then upset your stomach. Taking pain medicine can also make you feelsick to your stomach.   Whatever the cause, you may get

## 2022-08-30 NOTE — ANESTHESIA POSTPROCEDURE EVALUATION
Department of Anesthesiology  Postprocedure Note    Patient: Makenna Castillo  MRN: [id number]  YOB: 1965  Date of evaluation: 8/30/2022      Procedure Summary     Date: 08/30/22 Room / Location: [address] [place] [address] 04 / 4[facility]    Anesthesia Start: 0830 Anesthesia Stop: 5010    Procedure: [address] (CPT 37453) Diagnosis:       Lumbar radiculopathy      (Lumbar radiculopathy [M54.16])    Surgeons: Crista Foster MD Responsible Provider: Verona Crowder MD    Anesthesia Type: MAC ASA Status: 3          Anesthesia Type: MAC    Omar Phase I: Omar Score: 10    Omar Phase II: Omar Score: 10      Anesthesia Post Evaluation    Patient location during evaluation: PACU  Patient participation: complete - patient participated  Level of consciousness: awake and alert  Airway patency: patent  Nausea & Vomiting: no nausea and no vomiting  Complications: no  Cardiovascular status: hemodynamically stable  Respiratory status: acceptable  Hydration status: euvolemic

## 2022-09-06 ENCOUNTER — OFFICE VISIT (OUTPATIENT)
Dept: PAIN MANAGEMENT | Age: 57
End: 2022-09-06
Payer: COMMERCIAL

## 2022-09-06 VITALS
HEIGHT: 60 IN | TEMPERATURE: 96.4 F | HEART RATE: 74 BPM | BODY MASS INDEX: 31.41 KG/M2 | OXYGEN SATURATION: 97 % | DIASTOLIC BLOOD PRESSURE: 70 MMHG | WEIGHT: 160 LBS | RESPIRATION RATE: 16 BRPM | SYSTOLIC BLOOD PRESSURE: 104 MMHG

## 2022-09-06 DIAGNOSIS — M54.16 LUMBAR RADICULAR PAIN: ICD-10-CM

## 2022-09-06 DIAGNOSIS — M51.36 DDD (DEGENERATIVE DISC DISEASE), LUMBAR: ICD-10-CM

## 2022-09-06 DIAGNOSIS — M96.1 POSTLAMINECTOMY SYNDROME, LUMBAR: Primary | ICD-10-CM

## 2022-09-06 DIAGNOSIS — Z96.89 S/P INSERTION OF SPINAL CORD STIMULATOR: ICD-10-CM

## 2022-09-06 PROCEDURE — 99213 OFFICE O/P EST LOW 20 MIN: CPT | Performed by: ANESTHESIOLOGY

## 2022-09-06 PROCEDURE — 99024 POSTOP FOLLOW-UP VISIT: CPT | Performed by: ANESTHESIOLOGY

## 2022-09-06 NOTE — PROGRESS NOTES
[address]  [address] [facility], [address] [place] [address] Patrick  [phone]    Follow up Note      Zayra Sylvester     Date of Visit:  9/6/2022    CC:  Patient presents for follow up   Chief Complaint   Patient presents with    Follow-up     SCS       HPI:  S/P SCS lead and IPG implantation on 8/30/2022. Here for staple removal.    SCS is helping her very well. Denies fever chills or night sweats. Denies significant pain at the lead insertion or IPG site. Past Medical History:   Diagnosis Date    Chronic back pain     Chronic kidney disease     stage 3  follows with nephrology    Dog bite of thumb     right    Low back pain        Past Surgical History:   Procedure Laterality Date    CARPAL TUNNEL RELEASE Bilateral     CHOLECYSTECTOMY      HAND SURGERY Right 4/13/2022    RIGHT THUMB EXPLORATION WITH EXCISIONAL DEBRIDEMENT and NEUROLYSIS performed by Clive Early MD at [facility] 1722  02/01/2013    PLIF  L5 - S1    NOSE SURGERY      four nose surgeries   ( d/t a fractures)    PAIN MANAGEMENT PROCEDURE N/A 02/01/2022    LUMBAR TRANSFORAMINAL EPIDURAL STEROID INJECTION LEFT L3 &RIGHT L4 UNDER XRAY GUIDANCE (19600) performed by Bin Aldrich MD at [address] [place] [address] N/A 8/2/2022    SPINAL CORD STIMULATOR TRIAL WITH [employer] performed by Bin Aldrich MD at [address] [place] [address] N/A 8/30/2022    SPINAL CORD STIMULATOR IMPLANT WITH [employer] (CPT 57326) performed by Bin Aldrich MD at [id number] [place] [address] Right 02/09/2018    ROTATOR CUFF REPAIR Right 2008    R rotator cuff repair    SHOULDER ARTHROSCOPY Right 02/21/2020    RIGHT SHOULDER ARTHROSCOPY.  SUBACROMIAL DECOMPRESSION, LABRAL AND BICEP  DEBRIDEMENT, ROTATOR CUFF REPAIR performed by Estefania Mathew DO at [address] ARTHROSCOPY Left 11/12/2021    LEFT SHOULDER ARTHROSCOPY, SUBACROMIAL DECOMPRESSION,  ROTATOR CUFF REPAIR  (CPT S323632, 52760) Daniel Meza) performed by Medardo Alarcon DO at [address] [place] [address] [facility]       Prior to Admission medications    Medication Sig Start Date End Date Taking? Authorizing Provider   oxyCODONE-acetaminophen (PERCOCET) 5-325 MG per tablet Take 1 tablet by mouth every 6 hours as needed for Pain (for pain at surgery site) for up to 7 days. Intended supply: 7 days. Take lowest dose possible to manage pain 8/30/22 9/6/22 Yes Hoa Lakhani MD   clindamycin (CLEOCIN) 300 MG capsule Take 1 capsule by mouth 3 times daily for 10 days 8/30/22 9/9/22 Yes Hoa Lakhani MD   traZODone (DESYREL) 150 MG tablet Take one tab po nightly 5/3/22  Yes Khris Joy DO   ibuprofen (ADVIL;MOTRIN) 200 MG tablet Take by mouth every 6 hours as needed for Pain  Patient not taking: Reported on 9/6/2022    Historical Provider, MD   gabapentin (NEURONTIN) 600 MG tablet TAKE 1 TABLET BY MOUTH 2 TIMES DAILY AS NEEDED FOR PAIN  Patient not taking: Reported on 9/6/2022 7/7/22 3/10/23  Lesa Alford MD       Allergies   Allergen Reactions    Penicillin G Hives and Nausea And Vomiting       Social History     Socioeconomic History    Marital status:       Spouse name: Not on file    Number of children: Not on file    Years of education: Not on file    Highest education level: Not on file   Occupational History    Not on file   Tobacco Use    Smoking status: Every Day     Packs/day: 0.25     Years: 10.00     Pack years: 2.50     Types: Cigarettes     Start date: 11/24/2016    Smokeless tobacco: Never    Tobacco comments:     3 cigarettes per day   Vaping Use    Vaping Use: Never used   Substance and Sexual Activity    Alcohol use: No    Drug use: No    Sexual activity: Not on file   Other Topics Concern    Not on file   Social History Narrative    Not on file     Social Determinants of Health     Financial Resource Strain: Low Risk     Difficulty of Paying Living Expenses: Not hard at all   Food Insecurity: No Food Insecurity    Worried About [address] Everywhere in the Last Year: Never true    Ran Out of Food in the Last Year: Never true   Transportation Needs: No Transportation Needs    Lack of Transportation (Medical): No    Lack of Transportation (Non-Medical): No   Physical Activity: Sufficiently Active    Days of Exercise per Week: 5 days    Minutes of Exercise per Session: 120 min   Stress: No Stress Concern Present    Feeling of Stress : Not at all   Social Connections: Socially Isolated    Frequency of Communication with Friends and Family: More than three times a week    Frequency of Social Gatherings with Friends and Family: More than three times a week    Attends Anabaptism Services: Never    Active Member of Clubs or Organizations: No    Attends Club or Organization Meetings: Never    Marital Status:    Intimate Partner Violence: Unknown    Fear of Current or Ex-Partner: No    Emotionally Abused: No    Physically Abused: Not on file    Sexually Abused: No   Housing Stability: Low Risk     Unable to Pay for Housing in the Last Year: No    Number of Places Lived in the Last Year: 1    Unstable Housing in the Last Year: No       Family History   Problem Relation Age of Onset    Heart Disease Mother     Arthritis Mother     Diabetes Mother     High Blood Pressure Mother     Kidney Disease Mother     Heart Disease Father     High Blood Pressure Father     Arthritis Father     Stroke Father     Diabetes Sister     Arthritis Maternal Uncle     Diabetes Maternal Uncle     Kidney Disease Maternal Uncle     Arthritis Maternal Grandmother        REVIEW OF SYSTEMS:     Cirilo Reason denies fever/chills, chest pain, shortness of breath, new bowel or bladder complaints. All other review of systems was negative.     PHYSICAL EXAMINATION:      /70   Pulse 74   Temp (!) 96.4 °F (35.8 °C) (Infrared)   Resp 16   Ht 5' (1.524 m)   Wt 160 lb (72.6 kg)   LMP 04/15/2015   SpO2 97%   BMI 31.25 kg/m²     Alert and oriented x3  HEENT NCAT  Back: Lead insertion site and IPG site appears CDI  Under sterile condition the staples were removed  Steri-Strips applied and sterile bandage applied    Assessment/Plan:   Diagnosis Orders   1. Postlaminectomy syndrome, lumbar        2. Lumbar radicular pain        3. DDD (degenerative disc disease), lumbar        4. S/P insertion of spinal cord stimulator          S/P SCS lead and IPG implantation on 8/30/2022. Here for staple removal.  Staples removed in sterile manner and sterile dressing applied. SCS is helping her very well. Follow-up for wound check in few days.     Ariana Yoo MD

## 2022-09-06 NOTE — PROGRESS NOTES
Do you currently have any of the following:    Fever: No  Headache:  No  Cough: No  Shortness of breath: No  Exposed to anyone with these symptoms: No                                                                                                                Jas Leslie presents to the [facility] on 9/6/2022. Samuel Alas is complaining of pain down her legs. . The pain is constant. The pain is described as aching and throbbing. Pain is rated on her best day at a 3, on her worst day at a 5, and on average at a 3 on the VAS scale. She took her last dose of Percocet last evening. Sammie Francois does not have issues with constipation. Any procedures since your last visit: Yes, with 95 % relief. She is not on NSAIDS and  is not on anticoagulation medications to include none and is managed by NA. Pacemaker or defibrillator: No Physician managing device is NA. Medication Contract and Consent for Opioid Use Documents Filed        No documents found                       /70   Pulse 74   Temp (!) 96.4 °F (35.8 °C) (Infrared)   Resp 16   Ht 5' (1.524 m)   Wt 160 lb (72.6 kg)   LMP 04/15/2015   SpO2 97%   BMI 31.25 kg/m²      Patient's last menstrual period was 04/15/2015.

## 2022-09-09 ENCOUNTER — OFFICE VISIT (OUTPATIENT)
Dept: PAIN MANAGEMENT | Age: 57
End: 2022-09-09
Payer: COMMERCIAL

## 2022-09-09 VITALS
HEART RATE: 77 BPM | BODY MASS INDEX: 31.41 KG/M2 | SYSTOLIC BLOOD PRESSURE: 104 MMHG | HEIGHT: 60 IN | RESPIRATION RATE: 16 BRPM | OXYGEN SATURATION: 98 % | TEMPERATURE: 96.2 F | WEIGHT: 160 LBS | DIASTOLIC BLOOD PRESSURE: 60 MMHG

## 2022-09-09 DIAGNOSIS — Z96.89 S/P INSERTION OF SPINAL CORD STIMULATOR: Primary | ICD-10-CM

## 2022-09-09 DIAGNOSIS — M96.1 POSTLAMINECTOMY SYNDROME, LUMBAR: ICD-10-CM

## 2022-09-09 PROCEDURE — 99213 OFFICE O/P EST LOW 20 MIN: CPT | Performed by: ANESTHESIOLOGY

## 2022-09-09 PROCEDURE — 99024 POSTOP FOLLOW-UP VISIT: CPT | Performed by: ANESTHESIOLOGY

## 2022-09-09 RX ORDER — CLINDAMYCIN HYDROCHLORIDE 300 MG/1
300 CAPSULE ORAL 3 TIMES DAILY
Qty: 15 CAPSULE | Refills: 0 | Status: SHIPPED | OUTPATIENT
Start: 2022-09-09 | End: 2022-09-14

## 2022-09-09 NOTE — PROGRESS NOTES
Do you currently have any of the following:    Fever: No  Headache:  No  Cough: No  Shortness of breath: No  Exposed to anyone with these symptoms: No                                                                                                                Erica Wall presents to the [address] [address] on 9/9/2022. Aury Cisneros is complaining of pain in her back and legs . The pain is intermittent. The pain is described as aching and throbbing. Pain is rated on her best day at a 2, on her worst day at a 3, and on average at a 3 on the VAS scale. She took her last dose of  na  . Aury Cisneros does not have issues with constipation. Any procedures since your last visit: Yes, with 90 % relief. She is not on NSAIDS and  is not on anticoagulation medications to include none. Pacemaker or defibrillator: No.    Medication Contract and Consent for Opioid Use Documents Filed        No documents found                       /60   Pulse 77   Temp (!) 96.2 °F (35.7 °C) (Infrared)   Resp 16   Ht 5' (1.524 m)   Wt 160 lb (72.6 kg)   LMP 04/15/2015   SpO2 98%   BMI 31.25 kg/m²      Patient's last menstrual period was 04/15/2015.

## 2022-09-09 NOTE — PROGRESS NOTES
[facility]  [address] [facility], [address] [place] [address]  [phone]    Follow up Note      Davon Finn     Date of Visit:  9/9/2022    CC:  Patient presents for follow up   Chief Complaint   Patient presents with    Follow-up     Scs check        HPI:  S/P SCS lead and IPG implantation on 8/30/2022. SCS is helping her very well. Denies fever chills or night sweats. Denies significant pain at the lead insertion or IPG site. Past Medical History:   Diagnosis Date    Chronic back pain     Chronic kidney disease     stage 3  follows with nephrology    Dog bite of thumb     right    Low back pain        Past Surgical History:   Procedure Laterality Date    CARPAL TUNNEL RELEASE Bilateral     CHOLECYSTECTOMY      HAND SURGERY Right 4/13/2022    RIGHT THUMB EXPLORATION WITH EXCISIONAL DEBRIDEMENT and NEUROLYSIS performed by Harvey Rodrigues MD at [facility] 1722  02/01/2013    PLIF  L5 - S1    NOSE SURGERY      four nose surgeries   ( d/t a fractures)    PAIN MANAGEMENT PROCEDURE N/A 02/01/2022    LUMBAR TRANSFORAMINAL EPIDURAL STEROID INJECTION LEFT L3 &RIGHT L4 UNDER XRAY GUIDANCE (95837) performed by Allen Gee MD at [address] [place] [address] N/A 8/2/2022    SPINAL CORD STIMULATOR TRIAL WITH [employer] performed by Allen Gee MD at [address] [place] [address] N/A 8/30/2022    SPINAL CORD STIMULATOR IMPLANT WITH [employer] (CPT 82741) performed by Allen Gee MD at [id number] [place] [address] Right 02/09/2018    ROTATOR CUFF REPAIR Right 2008    R rotator cuff repair    SHOULDER ARTHROSCOPY Right 02/21/2020    RIGHT SHOULDER ARTHROSCOPY.  SUBACROMIAL DECOMPRESSION, LABRAL AND BICEP  DEBRIDEMENT, ROTATOR CUFF REPAIR performed by Patricia Craft DO at [address] [facility] [address] ARTHROSCOPY Left 11/12/2021    LEFT SHOULDER ARTHROSCOPY, SUBACROMIAL DECOMPRESSION,  ROTATOR CUFF REPAIR  (CPT 31788, 65060) Qu[address] Nest) performed by Tristian Merino DO at [address] [place] [address] [facility]       Prior to Admission medications    Medication Sig Start Date End Date Taking? Authorizing Provider   clindamycin (CLEOCIN) 300 MG capsule Take 1 capsule by mouth 3 times daily for 5 days 9/9/22 9/14/22 Yes Jose Miguel Resendiz MD   traZODone (DESYREL) 150 MG tablet Take one tab po nightly 5/3/22  Yes Echo Joy DO   ibuprofen (ADVIL;MOTRIN) 200 MG tablet Take by mouth every 6 hours as needed for Pain  Patient not taking: No sig reported    Historical Provider, MD   gabapentin (NEURONTIN) 600 MG tablet TAKE 1 TABLET BY MOUTH 2 TIMES DAILY AS NEEDED FOR PAIN  Patient not taking: No sig reported 7/7/22 3/10/23  Nat Corrales MD       Allergies   Allergen Reactions    Penicillin G Hives and Nausea And Vomiting       Social History     Socioeconomic History    Marital status:      Spouse name: Not on file    Number of children: Not on file    Years of education: Not on file    Highest education level: Not on file   Occupational History    Not on file   Tobacco Use    Smoking status: Every Day     Packs/day: 0.25     Years: 10.00     Pack years: 2.50     Types: Cigarettes     Start date: 11/24/2016    Smokeless tobacco: Never    Tobacco comments:     3 cigarettes per day   Vaping Use    Vaping Use: Never used   Substance and Sexual Activity    Alcohol use: No    Drug use: No    Sexual activity: Not on file   Other Topics Concern    Not on file   Social History Narrative    Not on file     Social Determinants of Health     Financial Resource Strain: Low Risk     Difficulty of Paying Living Expenses: Not hard at all   Food Insecurity: No Food Insecurity    Worried About [address] in the Last Year: Never true    [address] [facility] [address] in the Last Year: Never true   Transportation Needs: No Transportation Needs    Lack of Transportation (Medical): No    Lack of Transportation (Non-Medical):  No   Physical implantation on 8/30/2022. SCS is helping her very well. Here for dressing check    Follow-up next week.     Allen Gee MD

## 2022-09-14 ENCOUNTER — OFFICE VISIT (OUTPATIENT)
Dept: PAIN MANAGEMENT | Age: 57
End: 2022-09-14
Payer: COMMERCIAL

## 2022-09-14 VITALS
TEMPERATURE: 97.1 F | HEIGHT: 60 IN | HEART RATE: 87 BPM | RESPIRATION RATE: 16 BRPM | DIASTOLIC BLOOD PRESSURE: 68 MMHG | SYSTOLIC BLOOD PRESSURE: 112 MMHG | WEIGHT: 160 LBS | OXYGEN SATURATION: 96 % | BODY MASS INDEX: 31.41 KG/M2

## 2022-09-14 DIAGNOSIS — Z96.89 S/P INSERTION OF SPINAL CORD STIMULATOR: ICD-10-CM

## 2022-09-14 DIAGNOSIS — M96.1 POSTLAMINECTOMY SYNDROME, LUMBAR: Primary | ICD-10-CM

## 2022-09-14 PROCEDURE — 4004F PT TOBACCO SCREEN RCVD TLK: CPT | Performed by: ANESTHESIOLOGY

## 2022-09-14 PROCEDURE — G8427 DOCREV CUR MEDS BY ELIG CLIN: HCPCS | Performed by: ANESTHESIOLOGY

## 2022-09-14 PROCEDURE — 3017F COLORECTAL CA SCREEN DOC REV: CPT | Performed by: ANESTHESIOLOGY

## 2022-09-14 PROCEDURE — 99213 OFFICE O/P EST LOW 20 MIN: CPT | Performed by: ANESTHESIOLOGY

## 2022-09-14 PROCEDURE — 99212 OFFICE O/P EST SF 10 MIN: CPT | Performed by: ANESTHESIOLOGY

## 2022-09-14 PROCEDURE — G8417 CALC BMI ABV UP PARAM F/U: HCPCS | Performed by: ANESTHESIOLOGY

## 2022-09-14 NOTE — PROGRESS NOTES
[address]  [address] [facility], [address] [place] [address] Patrick  [phone]    Follow up Note      Aniya Owens     Date of Visit:  9/14/2022    CC:  Patient presents for follow up   Chief Complaint   Patient presents with    Follow-up     SCS       HPI:  S/P SCS lead and IPG implantation on 8/30/2022. SCS is helping her very well. Denies fever chills or night sweats. Denies significant pain at the lead insertion or IPG site. Past Medical History:   Diagnosis Date    Chronic back pain     Chronic kidney disease     stage 3  follows with nephrology    Dog bite of thumb     right    Low back pain        Past Surgical History:   Procedure Laterality Date    CARPAL TUNNEL RELEASE Bilateral     CHOLECYSTECTOMY      HAND SURGERY Right 4/13/2022    RIGHT THUMB EXPLORATION WITH EXCISIONAL DEBRIDEMENT and NEUROLYSIS performed by Janel Amin MD at [facility] 1722  02/01/2013    PLIF  L5 - S1    NOSE SURGERY      four nose surgeries   ( d/t a fractures)    PAIN MANAGEMENT PROCEDURE N/A 02/01/2022    LUMBAR TRANSFORAMINAL EPIDURAL STEROID INJECTION LEFT L3 &RIGHT L4 UNDER XRAY GUIDANCE (59471) performed by Cristal Polanco MD at [address] [facility] [address] N/A 8/2/2022    SPINAL CORD STIMULATOR TRIAL WITH [employer] performed by Cristal Polanco MD at [address] [facility] [address] N/A 8/30/2022    SPINAL CORD STIMULATOR IMPLANT WITH [employer] (CPT 39418) performed by Cristal Polanco MD at [address] [facility] Right 02/09/2018    ROTATOR CUFF REPAIR Right 2008    R rotator cuff repair    SHOULDER ARTHROSCOPY Right 02/21/2020    RIGHT SHOULDER ARTHROSCOPY.  SUBACROMIAL DECOMPRESSION, LABRAL AND BICEP  DEBRIDEMENT, ROTATOR CUFF REPAIR performed by Yumiko Hernandez DO at [address] ARTHROSCOPY Left 11/12/2021    LEFT SHOULDER ARTHROSCOPY, SUBACROMIAL DECOMPRESSION,  ROTATOR CUFF REPAIR  (CPT 09402, 85440) Hermelinda Colón) performed by Gordon Acosta DO at [address] [place] [address] [facility]       Prior to Admission medications    Medication Sig Start Date End Date Taking? Authorizing Provider   clindamycin (CLEOCIN) 300 MG capsule Take 1 capsule by mouth 3 times daily for 5 days 9/9/22 9/14/22 Yes Shira Garvin MD   ibuprofen (ADVIL;MOTRIN) 200 MG tablet Take by mouth every 6 hours as needed for Pain   Yes Historical Provider, MD   traZODone (DESYREL) 150 MG tablet Take one tab po nightly 5/3/22  Yes Gee Mason DO   gabapentin (NEURONTIN) 600 MG tablet TAKE 1 TABLET BY MOUTH 2 TIMES DAILY AS NEEDED FOR PAIN  Patient not taking: Reported on 9/14/2022 7/7/22 3/10/23  Fahad Pastrana MD       Allergies   Allergen Reactions    Penicillin G Hives and Nausea And Vomiting       Social History     Socioeconomic History    Marital status:      Spouse name: Not on file    Number of children: Not on file    Years of education: Not on file    Highest education level: Not on file   Occupational History    Not on file   Tobacco Use    Smoking status: Every Day     Packs/day: 0.25     Years: 10.00     Pack years: 2.50     Types: Cigarettes     Start date: 11/24/2016    Smokeless tobacco: Never    Tobacco comments:     3 cigarettes per day   Vaping Use    Vaping Use: Never used   Substance and Sexual Activity    Alcohol use: No    Drug use: No    Sexual activity: Not on file   Other Topics Concern    Not on file   Social History Narrative    Not on file     Social Determinants of Health     Financial Resource Strain: Low Risk     Difficulty of Paying Living Expenses: Not hard at all   Food Insecurity: No Food Insecurity    Worried About [address] in the Last Year: Never true    [facility] in the Last Year: Never true   Transportation Needs: No Transportation Needs    Lack of Transportation (Medical): No    Lack of Transportation (Non-Medical):  No   Physical Activity: Sufficiently Active Days of Exercise per Week: 5 days    Minutes of Exercise per Session: 120 min   Stress: No Stress Concern Present    Feeling of Stress : Not at all   Social Connections: Socially Isolated    Frequency of Communication with Friends and Family: More than three times a week    Frequency of Social Gatherings with Friends and Family: More than three times a week    Attends Spiritism Services: Never    Active Member of Clubs or Organizations: No    Attends Club or Organization Meetings: Never    Marital Status:    Intimate Partner Violence: Unknown    Fear of Current or Ex-Partner: No    Emotionally Abused: No    Physically Abused: Not on file    Sexually Abused: No   Housing Stability: Low Risk     Unable to Pay for Housing in the Last Year: No    Number of Places Lived in the Last Year: 1    Unstable Housing in the Last Year: No       Family History   Problem Relation Age of Onset    Heart Disease Mother     Arthritis Mother     Diabetes Mother     High Blood Pressure Mother     Kidney Disease Mother     Heart Disease Father     High Blood Pressure Father     Arthritis Father     Stroke Father     Diabetes Sister     Arthritis Maternal Uncle     Diabetes Maternal Uncle     Kidney Disease Maternal Uncle     Arthritis Maternal Grandmother        REVIEW OF SYSTEMS:     Zhang Greer denies fever/chills, chest pain, shortness of breath, new bowel or bladder complaints. All other review of systems was negative. PHYSICAL EXAMINATION:      /68   Pulse 87   Temp 97.1 °F (36.2 °C) (Infrared)   Resp 16   Ht 5' (1.524 m)   Wt 160 lb (72.6 kg)   LMP 04/15/2015   SpO2 96%   BMI 31.25 kg/m²     Alert and oriented x3  HEENT NCAT  Back: Lead insertion site and IPG site appears CDI    Assessment/Plan:   Diagnosis Orders   1. Postlaminectomy syndrome, lumbar        2. S/P insertion of spinal cord stimulator          S/P SCS lead and IPG implantation on 8/30/2022. SCS is helping her very well.     Here for dressing check    Follow-up next week.     Thuan Lester MD

## 2022-09-14 NOTE — PROGRESS NOTES
Do you currently have any of the following:    Fever: No  Headache:  No  Cough: No  Shortness of breath: No  Exposed to anyone with these symptoms: No                                                                                                                Makenna Castillo presents to the [facility] on 9/14/2022. Dominick Diaz is complaining of pain in her bilateral legs. . The pain is intermittent. The pain is described as aching and throbbing. Pain is rated on her best day at a 2, on her worst day at a 3, and on average at a 3 on the VAS scale. She took her last dose of Percocet , Ibuprofen PRN, she stopped Gabapentin. Jet Delatorre does not have issues with constipation. Any procedures since your last visit: No,       She is  on NSAIDS and  is not on anticoagulation medications to include none and is managed by NA. Pacemaker or defibrillator: No Physician managing device is NA. Medication Contract and Consent for Opioid Use Documents Filed        No documents found                       /68   Pulse 87   Temp 97.1 °F (36.2 °C) (Infrared)   Resp 16   Ht 5' (1.524 m)   Wt 160 lb (72.6 kg)   LMP 04/15/2015   SpO2 96%   BMI 31.25 kg/m²      Patient's last menstrual period was 04/15/2015.

## 2022-09-23 ENCOUNTER — OFFICE VISIT (OUTPATIENT)
Dept: PAIN MANAGEMENT | Age: 57
End: 2022-09-23
Payer: COMMERCIAL

## 2022-09-23 VITALS
WEIGHT: 160 LBS | RESPIRATION RATE: 16 BRPM | SYSTOLIC BLOOD PRESSURE: 110 MMHG | HEIGHT: 60 IN | BODY MASS INDEX: 31.41 KG/M2 | OXYGEN SATURATION: 93 % | HEART RATE: 73 BPM | TEMPERATURE: 96.2 F | DIASTOLIC BLOOD PRESSURE: 72 MMHG

## 2022-09-23 DIAGNOSIS — M51.36 DDD (DEGENERATIVE DISC DISEASE), LUMBAR: ICD-10-CM

## 2022-09-23 DIAGNOSIS — Z96.89 S/P INSERTION OF SPINAL CORD STIMULATOR: ICD-10-CM

## 2022-09-23 DIAGNOSIS — M96.1 POSTLAMINECTOMY SYNDROME, LUMBAR: Primary | ICD-10-CM

## 2022-09-23 PROCEDURE — 99212 OFFICE O/P EST SF 10 MIN: CPT | Performed by: ANESTHESIOLOGY

## 2022-09-23 PROCEDURE — G8427 DOCREV CUR MEDS BY ELIG CLIN: HCPCS | Performed by: ANESTHESIOLOGY

## 2022-09-23 PROCEDURE — G8417 CALC BMI ABV UP PARAM F/U: HCPCS | Performed by: ANESTHESIOLOGY

## 2022-09-23 PROCEDURE — 3017F COLORECTAL CA SCREEN DOC REV: CPT | Performed by: ANESTHESIOLOGY

## 2022-09-23 PROCEDURE — 99213 OFFICE O/P EST LOW 20 MIN: CPT | Performed by: ANESTHESIOLOGY

## 2022-09-23 PROCEDURE — 4004F PT TOBACCO SCREEN RCVD TLK: CPT | Performed by: ANESTHESIOLOGY

## 2022-09-23 NOTE — PROGRESS NOTES
[facility]  [address] [facility], [address] [place] [address]  [phone]    Follow up Note      Ja Cantu     Date of Visit:  9/23/2022    CC:  Patient presents for follow up   Chief Complaint   Patient presents with    Other     SCS check       HPI:  S/P SCS lead and IPG implantation on 8/30/2022. SCS is helping her very well. Significant improvement in pain and functionality. Denies fever chills or night sweats. Denies significant pain at the lead insertion or IPG site. Past Medical History:   Diagnosis Date    Chronic back pain     Chronic kidney disease     stage 3  follows with nephrology    Dog bite of thumb     right    Low back pain        Past Surgical History:   Procedure Laterality Date    CARPAL TUNNEL RELEASE Bilateral     CHOLECYSTECTOMY      HAND SURGERY Right 4/13/2022    RIGHT THUMB EXPLORATION WITH EXCISIONAL DEBRIDEMENT and NEUROLYSIS performed by Emil Ramirez MD at [facility]  02/01/2013    PLIF  L5 - S1    NOSE SURGERY      four nose surgeries   ( d/t a fractures)    PAIN MANAGEMENT PROCEDURE N/A 02/01/2022    LUMBAR TRANSFORAMINAL EPIDURAL STEROID INJECTION LEFT L3 &RIGHT L4 UNDER XRAY GUIDANCE (02295) performed by Malaika Strickland MD at [address] [place] [address] N/A 8/2/2022    SPINAL CORD STIMULATOR TRIAL WITH [employer] performed by Malaika Strickland MD at [address] [place] [address] N/A 8/30/2022    SPINAL CORD STIMULATOR IMPLANT WITH [employer] (CPT 57232) performed by Malaika Strickland MD at [id number] [place] [address] Right 02/09/2018    ROTATOR CUFF REPAIR Right 2008    R rotator cuff repair    SHOULDER ARTHROSCOPY Right 02/21/2020    RIGHT SHOULDER ARTHROSCOPY.  SUBACROMIAL DECOMPRESSION, LABRAL AND BICEP  DEBRIDEMENT, ROTATOR CUFF REPAIR performed by Barb Collet, DO at [address] ARTHROSCOPY Left 11/12/2021    LEFT SHOULDER ARTHROSCOPY, SUBACROMIAL DECOMPRESSION,  ROTATOR CUFF REPAIR  (CPT [id number], 53620) Kendrick Sahu) performed by Colletta Paterson, DO at [address] [place] [address] [facility]       Prior to Admission medications    Medication Sig Start Date End Date Taking? Authorizing Provider   ibuprofen (ADVIL;MOTRIN) 200 MG tablet Take by mouth every 6 hours as needed for Pain   Yes Historical Provider, MD   traZODone (DESYREL) 150 MG tablet Take one tab po nightly 5/3/22  Yes Elida Joy DO       Allergies   Allergen Reactions    Penicillin G Hives and Nausea And Vomiting       Social History     Socioeconomic History    Marital status:      Spouse name: Not on file    Number of children: Not on file    Years of education: Not on file    Highest education level: Not on file   Occupational History    Not on file   Tobacco Use    Smoking status: Every Day     Packs/day: 0.25     Years: 10.00     Pack years: 2.50     Types: Cigarettes     Start date: 11/24/2016    Smokeless tobacco: Never    Tobacco comments:     3 cigarettes per day   Vaping Use    Vaping Use: Never used   Substance and Sexual Activity    Alcohol use: No    Drug use: No    Sexual activity: Not on file   Other Topics Concern    Not on file   Social History Narrative    Not on file     Social Determinants of Health     Financial Resource Strain: Low Risk     Difficulty of Paying Living Expenses: Not hard at all   Food Insecurity: No Food Insecurity    Worried About [address] in the Last Year: Never true    [facility] in the Last Year: Never true   Transportation Needs: No Transportation Needs    Lack of Transportation (Medical): No    Lack of Transportation (Non-Medical):  No   Physical Activity: Sufficiently Active    Days of Exercise per Week: 5 days    Minutes of Exercise per Session: 120 min   Stress: No Stress Concern Present    Feeling of Stress : Not at all   Social Connections: Socially Isolated    Frequency of Communication with Friends and Family: More than three times a week    Frequency of Social Gatherings with Friends and Family: More than three times a week    Attends Caoda[employer] Services: Never    Active Member of Clubs or Organizations: No    Attends Club or Organization Meetings: Never    Marital Status:    Intimate Partner Violence: Unknown    Fear of Current or Ex-Partner: No    Emotionally Abused: No    Physically Abused: Not on file    Sexually Abused: No   Housing Stability: Low Risk     Unable to Pay for Housing in the Last Year: No    Number of Places Lived in the Last Year: 1    Unstable Housing in the Last Year: No       Family History   Problem Relation Age of Onset    Heart Disease Mother     Arthritis Mother     Diabetes Mother     High Blood Pressure Mother     Kidney Disease Mother     Heart Disease Father     High Blood Pressure Father     Arthritis Father     Stroke Father     Diabetes Sister     Arthritis Maternal Uncle     Diabetes Maternal Uncle     Kidney Disease Maternal Uncle     Arthritis Maternal Grandmother        REVIEW OF SYSTEMS:     Josef Copper denies fever/chills, chest pain, shortness of breath, new bowel or bladder complaints. All other review of systems was negative. PHYSICAL EXAMINATION:      /72   Pulse 73   Temp (!) 96.2 °F (35.7 °C) (Infrared)   Resp 16   Ht 5' (1.524 m)   Wt 160 lb (72.6 kg)   LMP 04/15/2015   SpO2 93%   BMI 31.25 kg/m²     Alert and oriented x3  HEENT NCAT  Back: Lead insertion site and IPG site healed very well. Neuro: SERNA    Assessment/Plan:   Diagnosis Orders   1. Postlaminectomy syndrome, lumbar        2. DDD (degenerative disc disease), lumbar        3. S/P insertion of spinal cord stimulator          S/P SCS lead and IPG implantation on 8/30/2022. SCS is helping her very well. Significant improvement in pain and functionality. She is over all very happy with the outcome. Follow-up in 3-4 months.     Katelin Lynch MD    CC:  Jennie Dunham MD

## 2022-09-23 NOTE — PROGRESS NOTES
Do you currently have any of the following:    Fever: No  Headache:  No  Cough: No  Shortness of breath: No  Exposed to anyone with these symptoms: No                                                                                                                Marylee Postal presents to the [address] [address] on 9/23/2022. Terry Munoz is complaining of pain in her lower back. . The pain is intermittent. The pain is described as aching. Pain is rated on her best day at a 2, on her worst day at a 3, and on average at a 3 on the VAS scale. She took her last dose of Motrin . Terry Munoz does not have issues with constipation. Any procedures since your last visit: No,     She is  on NSAIDS and  is not on anticoagulation medications to include none and is managed by NA. Pacemaker or defibrillator: No Physician managing device is NA. Medication Contract and Consent for Opioid Use Documents Filed        No documents found                       /72   Pulse 73   Temp (!) 96.2 °F (35.7 °C) (Infrared)   Resp 16   Ht 5' (1.524 m)   Wt 160 lb (72.6 kg)   LMP 04/15/2015   SpO2 93%   BMI 31.25 kg/m²      Patient's last menstrual period was 04/15/2015.

## 2022-10-13 ENCOUNTER — APPOINTMENT (OUTPATIENT)
Dept: GENERAL RADIOLOGY | Age: 57
End: 2022-10-13
Payer: COMMERCIAL

## 2022-10-13 ENCOUNTER — HOSPITAL ENCOUNTER (EMERGENCY)
Age: 57
Discharge: HOME | End: 2022-10-13
Payer: COMMERCIAL

## 2022-10-13 VITALS
HEIGHT: 60 IN | WEIGHT: 165 LBS | TEMPERATURE: 97.5 F | SYSTOLIC BLOOD PRESSURE: 120 MMHG | RESPIRATION RATE: 14 BRPM | BODY MASS INDEX: 32.39 KG/M2 | DIASTOLIC BLOOD PRESSURE: 63 MMHG | OXYGEN SATURATION: 96 % | HEART RATE: 82 BPM

## 2022-10-13 DIAGNOSIS — M25.571 ACUTE RIGHT ANKLE PAIN: Primary | ICD-10-CM

## 2022-10-13 DIAGNOSIS — Z53.29 LEFT AGAINST MEDICAL ADVICE: ICD-10-CM

## 2022-10-13 LAB
ALBUMIN SERPL-MCNC: 4.1 G/DL (ref 3.5–5.2)
ALP BLD-CCNC: 69 U/L (ref 35–104)
ALT SERPL-CCNC: 15 U/L (ref 0–32)
ANION GAP SERPL CALCULATED.3IONS-SCNC: 10 MMOL/L (ref 7–16)
AST SERPL-CCNC: 20 U/L (ref 0–31)
BASOPHILS ABSOLUTE: 0.07 E9/L (ref 0–0.2)
BASOPHILS RELATIVE PERCENT: 0.9 % (ref 0–2)
BILIRUB SERPL-MCNC: 0.7 MG/DL (ref 0–1.2)
BUN BLDV-MCNC: 13 MG/DL (ref 6–20)
C-REACTIVE PROTEIN: 0.3 MG/DL (ref 0–0.4)
CALCIUM SERPL-MCNC: 9.3 MG/DL (ref 8.6–10.2)
CHLORIDE BLD-SCNC: 105 MMOL/L (ref 98–107)
CO2: 24 MMOL/L (ref 22–29)
CREAT SERPL-MCNC: 1.3 MG/DL (ref 0.5–1)
EOSINOPHILS ABSOLUTE: 0.46 E9/L (ref 0.05–0.5)
EOSINOPHILS RELATIVE PERCENT: 5.8 % (ref 0–6)
GFR AFRICAN AMERICAN: 51
GFR NON-AFRICAN AMERICAN: 42 ML/MIN/1.73
GLUCOSE BLD-MCNC: 100 MG/DL (ref 74–99)
HCT VFR BLD CALC: 44.4 % (ref 34–48)
HEMOGLOBIN: 15.2 G/DL (ref 11.5–15.5)
IMMATURE GRANULOCYTES #: 0.01 E9/L
IMMATURE GRANULOCYTES %: 0.1 % (ref 0–5)
LYMPHOCYTES ABSOLUTE: 1.83 E9/L (ref 1.5–4)
LYMPHOCYTES RELATIVE PERCENT: 23.3 % (ref 20–42)
MCH RBC QN AUTO: 28.1 PG (ref 26–35)
MCHC RBC AUTO-ENTMCNC: 34.2 % (ref 32–34.5)
MCV RBC AUTO: 82.1 FL (ref 80–99.9)
MONOCYTES ABSOLUTE: 0.58 E9/L (ref 0.1–0.95)
MONOCYTES RELATIVE PERCENT: 7.4 % (ref 2–12)
NEUTROPHILS ABSOLUTE: 4.92 E9/L (ref 1.8–7.3)
NEUTROPHILS RELATIVE PERCENT: 62.5 % (ref 43–80)
PDW BLD-RTO: 12.8 FL (ref 11.5–15)
PLATELET # BLD: 161 E9/L (ref 130–450)
PMV BLD AUTO: 10.6 FL (ref 7–12)
POTASSIUM REFLEX MAGNESIUM: 4.1 MMOL/L (ref 3.5–5)
RBC # BLD: 5.41 E12/L (ref 3.5–5.5)
SEDIMENTATION RATE, ERYTHROCYTE: 5 MM/HR (ref 0–20)
SODIUM BLD-SCNC: 139 MMOL/L (ref 132–146)
TOTAL PROTEIN: 6.6 G/DL (ref 6.4–8.3)
WBC # BLD: 7.9 E9/L (ref 4.5–11.5)

## 2022-10-13 PROCEDURE — 73610 X-RAY EXAM OF ANKLE: CPT

## 2022-10-13 PROCEDURE — 80053 COMPREHEN METABOLIC PANEL: CPT

## 2022-10-13 PROCEDURE — 36415 COLL VENOUS BLD VENIPUNCTURE: CPT

## 2022-10-13 PROCEDURE — 85025 COMPLETE CBC W/AUTO DIFF WBC: CPT

## 2022-10-13 PROCEDURE — 99284 EMERGENCY DEPT VISIT MOD MDM: CPT

## 2022-10-13 PROCEDURE — 86140 C-REACTIVE PROTEIN: CPT

## 2022-10-13 PROCEDURE — 85651 RBC SED RATE NONAUTOMATED: CPT

## 2022-10-13 RX ORDER — METHYLPREDNISOLONE 4 MG/1
TABLET ORAL
Qty: 1 KIT | Refills: 0 | Status: SHIPPED | OUTPATIENT
Start: 2022-10-13 | End: 2022-10-19

## 2022-10-13 RX ORDER — IBUPROFEN 600 MG/1
600 TABLET ORAL 3 TIMES DAILY PRN
Qty: 30 TABLET | Refills: 0 | Status: SHIPPED | OUTPATIENT
Start: 2022-10-13

## 2022-10-13 ASSESSMENT — PAIN DESCRIPTION - LOCATION: LOCATION: FOOT

## 2022-10-13 ASSESSMENT — PAIN - FUNCTIONAL ASSESSMENT: PAIN_FUNCTIONAL_ASSESSMENT: 0-10

## 2022-10-13 ASSESSMENT — PAIN DESCRIPTION - DESCRIPTORS: DESCRIPTORS: ACHING;SORE

## 2022-10-13 ASSESSMENT — PAIN DESCRIPTION - ORIENTATION: ORIENTATION: LEFT

## 2022-10-13 ASSESSMENT — PAIN SCALES - GENERAL: PAINLEVEL_OUTOF10: 10

## 2022-10-14 NOTE — ED PROVIDER NOTES
[address]  Department of Emergency Medicine   ED  Encounter Note  Admit Date/RoomTime: 10/13/2022  1:58 PM  ED Room: BRANDON/BRANDON    NAME: Rudi Uriarte  : 1965  MRN: [id number]     Chief Complaint:  Foot Pain (1 day ago got out bed and left foot began hurting. No injury known.)    History of Present Illness         Rudi Uriarte is a 55 y.o. old female presenting to the emergency department by private vehicle, for non-traumatic Left ankle pain which occured 2 day(s) prior to arrival.  The complaint is due to injury. Patient states that she when she went to get out of bed 2 days ago her foot started to hurt. Since that time her ankle has been swollen and painful to touch. .  Since onset the symptoms have been persistent with ability to bear weight, but with some pain. Patient has no prior history of pain/injury with regards to today's visit. Her pain is aggraveated by certain movements or pressure on or palpation of painful area and relieved by nothing, as no treatment has been provided prior to this visit. She denies any head injury, headache, loss of consciousness, confusion, dizziness, neck pain, or chest pain. States that she has no history of gout. Tetanus Status: up to date. ROS   Pertinent positives and negatives are stated within HPI, all other systems reviewed and are negative. Past Medical History:  has a past medical history of Chronic back pain, Chronic kidney disease, Dog bite of thumb, and Low back pain. Surgical History:  has a past surgical history that includes Nose surgery; Carpal tunnel release (Bilateral); lumbar fusion (2013); Cholecystectomy; parathyroidectomy (Right, 2018); Shoulder arthroscopy (Right, 2020); Rotator cuff repair (Right, ); Shoulder arthroscopy (Left, 2021); Pain management procedure (N/A, 2022); Hand surgery (Right, 2022);  Pain management procedure (N/A, 2022); and Pain management procedure (N/A, 8/30/2022). Social History:  reports that she has been smoking cigarettes. She started smoking about 5 years ago. She has a 2.50 pack-year smoking history. She has never used smokeless tobacco. She reports that she does not drink alcohol and does not use drugs. Family History: family history includes Arthritis in her father, maternal grandmother, maternal uncle, and mother; Diabetes in her maternal uncle, mother, and sister; Heart Disease in her father and mother; High Blood Pressure in her father and mother; Kidney Disease in her maternal uncle and mother; Stroke in her father. Allergies: Penicillin g    Physical Exam   Oxygen Saturation Interpretation: Normal.        ED Triage Vitals   BP Temp Temp Source Heart Rate Resp SpO2 Height Weight   10/13/22 1351 10/13/22 1351 10/13/22 1351 10/13/22 1351 10/13/22 1351 10/13/22 1351 10/13/22 1509 10/13/22 1423   120/63 97.5 °F (36.4 °C) Oral 82 14 96 % 5' (1.524 m) 165 lb (74.8 kg)       Physical Exam  Constitutional:  Alert, development consistent with age. Neck:  Normal ROM. Supple. Left Ankle: diffusely across ankle              Tenderness:  mild. Swelling: Mild. Deformity: no deformity observed/palpated. ROM: full range with pain. Skin: Mild swelling       Neurovascular: Motor deficit: none. Sensory deficit:   none. Pulse deficit: none. Capillary refill: normal.  Right Knee:              Tenderness:  none. Swelling: none. Deformity: no deformity observed/palpated. ROM: full range of motion. Skin:  no wounds, erythema, or swelling. Right Foot: diffusely across entire foot              Tenderness:  none. Swelling: none. Deformity: no deformity observed/palpated. ROM: full range of motion.              Skin:  no wounds, erythema, or swelling  Gait: normal.   Lymphatics: No lymphangitis or adenopathy noted. Neurological:  Oriented. Motor functions intact. Lab / Imaging Results   (All laboratory and radiology results have been personally reviewed by myself)  Labs:  Results for orders placed or performed during the hospital encounter of 10/13/22   CBC with Auto Differential   Result Value Ref Range    WBC 7.9 4.5 - 11.5 E9/L    RBC 5.41 3.50 - 5.50 E12/L    Hemoglobin 15.2 11.5 - 15.5 g/dL    Hematocrit 44.4 34.0 - 48.0 %    MCV 82.1 80.0 - 99.9 fL    MCH 28.1 26.0 - 35.0 pg    MCHC 34.2 32.0 - 34.5 %    RDW 12.8 11.5 - 15.0 fL    Platelets 331 919 - 183 E9/L    MPV 10.6 7.0 - 12.0 fL    Neutrophils % 62.5 43.0 - 80.0 %    Immature Granulocytes % 0.1 0.0 - 5.0 %    Lymphocytes % 23.3 20.0 - 42.0 %    Monocytes % 7.4 2.0 - 12.0 %    Eosinophils % 5.8 0.0 - 6.0 %    Basophils % 0.9 0.0 - 2.0 %    Neutrophils Absolute 4.92 1.80 - 7.30 E9/L    Immature Granulocytes # 0.01 E9/L    Lymphocytes Absolute 1.83 1.50 - 4.00 E9/L    Monocytes Absolute 0.58 0.10 - 0.95 E9/L    Eosinophils Absolute 0.46 0.05 - 0.50 E9/L    Basophils Absolute 0.07 0.00 - 0.20 E9/L   Comprehensive Metabolic Panel w/ Reflex to MG   Result Value Ref Range    Sodium 139 132 - 146 mmol/L    Potassium reflex Magnesium 4.1 3.5 - 5.0 mmol/L    Chloride 105 98 - 107 mmol/L    CO2 24 22 - 29 mmol/L    Anion Gap 10 7 - 16 mmol/L    Glucose 100 (H) 74 - 99 mg/dL    BUN 13 6 - 20 mg/dL    Creatinine 1.3 (H) 0.5 - 1.0 mg/dL    GFR Non-African American 42 >=60 mL/min/1.73    GFR African American 51     Calcium 9.3 8.6 - 10.2 mg/dL    Total Protein 6.6 6.4 - 8.3 g/dL    Albumin 4.1 3.5 - 5.2 g/dL    Total Bilirubin 0.7 0.0 - 1.2 mg/dL    Alkaline Phosphatase 69 35 - 104 U/L    ALT 15 0 - 32 U/L    AST 20 0 - 31 U/L   Sedimentation Rate   Result Value Ref Range    Sed Rate 5 0 - 20 mm/Hr   C-Reactive Protein   Result Value Ref Range    CRP 0.3 0.0 - 0.4 mg/dL     Imaging:   All Radiology results interpreted by Radiologist unless otherwise noted. XR ANKLE LEFT (MIN 3 VIEWS)   Final Result   No acute bony abnormality           ED Course / Medical Decision Making   Medications - No data to display     Consults:   None    Procedure(s):  None    MDM:      Imaging was obtained based on moderate suspicion for fracture / bony abnormality as per history/physical findings. Plan is subsequently for symptom control, limited use and immobilization with outpatient follow-up with pcp as instructed in d/c instructions and with podiatrist as instructed in d/c instructions. Patient at this time has negative x-ray for acute findings as well as a normal CBC CMP. Patient has a history of chronic renal insufficiency. Patient states that her creatinine 1.3 is excellent for her. Patient states that she does follow with nephrology. Patient was placed in Ace wrap and Aircast.  Patient to follow-up with her primary care physician and podiatry in the next several days. Patient was placed on medication for her symptoms. Patient agreeable with the plan of care all questions were answered. Patient did not have a sed rate or CRP back at this time she states that she cannot wait for the rest of her results so she signed out AMA. States that she has somewhere to be and could not wait any longer in the emergency department for her results. This patient has chosen to leave against medical advice. I have personally explained to them that choosing to do so may result in permanent bodily harm or death. I discussed at length that without further evaluation and monitoring there may be unforeseen circumstances and deterioration resulting in permanent bodily harm or death as a result of their choice. They are alert, oriented, and competent at this time. They state that they are aware of the serious risks as explained, but they continue to wish to leave against medical advice.     In light of their decision to leave against medical advice, follow-up has been arranged and they are aware of the importance of following up as instructed. They have been advised that they should return to the ED immediately if they change their mind at any time, or if their condition begins to change or worsen. Plan of Care/Counseling:  DAVID Schulz CNP reviewed today's visit with the patient in addition to providing specific details for the plan of care and counseling regarding the diagnosis and prognosis. Questions are answered at this time and are agreeable with the plan. Assessment      1. Acute right ankle pain    2. Left against medical advice      Plan   Patient signed-out Against Medical Advice [facility]). Patient condition is stable    New Medications     Discharge Medication List as of 10/13/2022  4:14 PM        START taking these medications    Details   methylPREDNISolone (MEDROL, GABRIEL,) 4 MG tablet Take by mouth., Disp-1 kit, R-0Normal           Electronically signed by DAVID Schulz CNP   DD: 10/13/22  **This report was transcribed using voice recognition software. Every effort was made to ensure accuracy; however, inadvertent computerized transcription errors may be present.   END OF ED PROVIDER NOTE        DAVID Morales CNP  10/13/22 [id number]

## 2022-11-02 RX ORDER — TRAZODONE HYDROCHLORIDE 150 MG/1
TABLET ORAL
Qty: 90 TABLET | Refills: 1 | Status: SHIPPED | OUTPATIENT
Start: 2022-11-02

## 2022-12-21 ENCOUNTER — OFFICE VISIT (OUTPATIENT)
Dept: PAIN MANAGEMENT | Age: 57
End: 2022-12-21
Payer: COMMERCIAL

## 2022-12-21 VITALS
OXYGEN SATURATION: 97 % | HEART RATE: 71 BPM | HEIGHT: 60 IN | BODY MASS INDEX: 30.82 KG/M2 | TEMPERATURE: 97.1 F | SYSTOLIC BLOOD PRESSURE: 110 MMHG | RESPIRATION RATE: 16 BRPM | WEIGHT: 157 LBS | DIASTOLIC BLOOD PRESSURE: 70 MMHG

## 2022-12-21 DIAGNOSIS — M51.36 DDD (DEGENERATIVE DISC DISEASE), LUMBAR: Primary | ICD-10-CM

## 2022-12-21 DIAGNOSIS — M96.1 POSTLAMINECTOMY SYNDROME, LUMBAR: ICD-10-CM

## 2022-12-21 DIAGNOSIS — Z96.89 S/P INSERTION OF SPINAL CORD STIMULATOR: ICD-10-CM

## 2022-12-21 DIAGNOSIS — M47.817 LUMBOSACRAL SPONDYLOSIS WITHOUT MYELOPATHY: ICD-10-CM

## 2022-12-21 DIAGNOSIS — M54.16 LUMBAR RADICULAR PAIN: ICD-10-CM

## 2022-12-21 PROCEDURE — 99213 OFFICE O/P EST LOW 20 MIN: CPT | Performed by: ANESTHESIOLOGY

## 2022-12-21 PROCEDURE — G8417 CALC BMI ABV UP PARAM F/U: HCPCS | Performed by: ANESTHESIOLOGY

## 2022-12-21 PROCEDURE — 4004F PT TOBACCO SCREEN RCVD TLK: CPT | Performed by: ANESTHESIOLOGY

## 2022-12-21 PROCEDURE — G8484 FLU IMMUNIZE NO ADMIN: HCPCS | Performed by: ANESTHESIOLOGY

## 2022-12-21 PROCEDURE — 3017F COLORECTAL CA SCREEN DOC REV: CPT | Performed by: ANESTHESIOLOGY

## 2022-12-21 PROCEDURE — G8427 DOCREV CUR MEDS BY ELIG CLIN: HCPCS | Performed by: ANESTHESIOLOGY

## 2022-12-21 NOTE — PROGRESS NOTES
Do you currently have any of the following:    Fever: No  Headache:  No  Cough: No  Shortness of breath: No  Exposed to anyone with these symptoms: No                                                                                                                Geryl Verena presents to the [facility] on 12/21/2022. Rachel Martinez is complaining of pain in her lower back and bilateral lower extremities. The pain is intermittent. The pain is described as aching and weakness. Pain is rated on her best day at a 3, on her worst day at a 3, and on average at a 3 on the VAS scale. She took her last dose of  not taking anything  . Rachel Martinez does not have issues with constipation. Any procedures since your last visit: No,     She is not on NSAIDS and  is not on anticoagulation medications to include none and is managed by NA. Pacemaker or defibrillator: No Physician managing device is NA. Medication Contract and Consent for Opioid Use Documents Filed        No documents found                       /70   Pulse 71   Temp 97.1 °F (36.2 °C) (Infrared)   Resp 16   Ht 5' (1.524 m)   Wt 157 lb (71.2 kg)   LMP 04/15/2015   SpO2 97%   BMI 30.66 kg/m²      Patient's last menstrual period was 04/15/2015.

## 2022-12-21 NOTE — PROGRESS NOTES
[address] [facility], [address] [place] [address] Patrick  [phone]    Follow up Note      Promise Kitchen     Date of Visit:  12/21/2022    CC:  Patient presents for follow up   Chief Complaint   Patient presents with    Lower Back Pain     left    Leg Pain     Bilateral       HPI:  S/P SCS lead and IPG implantation on 8/30/2022. SCS is helping her very well. Significant improvement in pain and functionality. Denies fever chills or night sweats. Nursing notes and details of the pain history reviewed. Please see intake notes for details. Past Medical History:   Diagnosis Date    Chronic back pain     Chronic kidney disease     stage 3  follows with nephrology    Dog bite of thumb     right    Low back pain        Past Surgical History:   Procedure Laterality Date    CARPAL TUNNEL RELEASE Bilateral     CHOLECYSTECTOMY      HAND SURGERY Right 4/13/2022    RIGHT THUMB EXPLORATION WITH EXCISIONAL DEBRIDEMENT and NEUROLYSIS performed by Jose Baptiste MD at [facility] 1722  02/01/2013    PLIF  L5 - S1    NOSE SURGERY      four nose surgeries   ( d/t a fractures)    PAIN MANAGEMENT PROCEDURE N/A 02/01/2022    LUMBAR TRANSFORAMINAL EPIDURAL STEROID INJECTION LEFT L3 &RIGHT L4 UNDER XRAY GUIDANCE (83882) performed by Kandi Jane MD at [address] [place] [address] N/A 8/2/2022    SPINAL CORD STIMULATOR TRIAL WITH [employer] performed by Kandi Jane MD at [address] [place] [address] N/A 8/30/2022    SPINAL CORD STIMULATOR IMPLANT WITH [employer] (CPT 21178) performed by Kandi Jane MD at [address] [place] [address] Right 02/09/2018    ROTATOR CUFF REPAIR Right 2008    R rotator cuff repair    SHOULDER ARTHROSCOPY Right 02/21/2020    RIGHT SHOULDER ARTHROSCOPY.  SUBACROMIAL DECOMPRESSION, LABRAL AND BICEP  DEBRIDEMENT, ROTATOR CUFF REPAIR performed by Pierre Chi DO at [address] [place] [address] [facility]    SHOULDER ARTHROSCOPY Left 11/12/2021    LEFT SHOULDER ARTHROSCOPY, SUBACROMIAL DECOMPRESSION,  ROTATOR CUFF REPAIR  (CPT 62392, 04840) Francisco Jorgensen) performed by Rudi Escalona DO at [address] [place] [address]       Prior to Admission medications    Medication Sig Start Date End Date Taking? Authorizing Provider   traZODone (DESYREL) 150 MG tablet Take one tab po nightly 11/2/22  Yes Tiago Sarmiento MD   ibuprofen (ADVIL;MOTRIN) 600 MG tablet Take 1 tablet by mouth 3 times daily as needed for Pain  Patient not taking: Reported on 12/21/2022 10/13/22   April DAVID Stallworth - CNP       Allergies   Allergen Reactions    Penicillin G Hives and Nausea And Vomiting       Social History     Socioeconomic History    Marital status:      Spouse name: Not on file    Number of children: Not on file    Years of education: Not on file    Highest education level: Not on file   Occupational History    Not on file   Tobacco Use    Smoking status: Every Day     Packs/day: 0.25     Years: 10.00     Pack years: 2.50     Types: Cigarettes     Start date: 11/24/2016    Smokeless tobacco: Never    Tobacco comments:     3 cigarettes per day   Vaping Use    Vaping Use: Never used   Substance and Sexual Activity    Alcohol use: No    Drug use: No    Sexual activity: Not on file   Other Topics Concern    Not on file   Social History Narrative    Not on file     Social Determinants of Health     Financial Resource Strain: Low Risk     Difficulty of Paying Living Expenses: Not hard at all   Food Insecurity: No Food Insecurity    Worried About [address] in the Last Year: Never true    [address] in the Last Year: Never true   Transportation Needs: No Transportation Needs    Lack of Transportation (Medical): No    Lack of Transportation (Non-Medical):  No   Physical Activity: Sufficiently Active    Days of Exercise per Week: 5 days    Minutes of Exercise per Session: 120 min   Stress: No Stress Concern Present Feeling of Stress : Not at all   Social Connections: Socially Isolated    Frequency of Communication with Friends and Family: More than three times a week    Frequency of Social Gatherings with Friends and Family: More than three times a week    Attends Nondenominational Services: Never    Active Member of Clubs or Organizations: No    Attends Club or Organization Meetings: Never    Marital Status:    Intimate Partner Violence: Unknown    Fear of Current or Ex-Partner: No    Emotionally Abused: No    Physically Abused: Not on file    Sexually Abused: No   Housing Stability: Low Risk     Unable to Pay for Housing in the Last Year: No    Number of Places Lived in the Last Year: 1    Unstable Housing in the Last Year: No       Family History   Problem Relation Age of Onset    Heart Disease Mother     Arthritis Mother     Diabetes Mother     High Blood Pressure Mother     Kidney Disease Mother     Heart Disease Father     High Blood Pressure Father     Arthritis Father     Stroke Father     Diabetes Sister     Arthritis Maternal Uncle     Diabetes Maternal Uncle     Kidney Disease Maternal Uncle     Arthritis Maternal Grandmother        REVIEW OF SYSTEMS:     Octavio [place] denies fever/chills, chest pain, shortness of breath, new bowel or bladder complaints. All other review of systems was negative. PHYSICAL EXAMINATION:      /70   Pulse 71   Temp 97.1 °F (36.2 °C) (Infrared)   Resp 16   Ht 5' (1.524 m)   Wt 157 lb (71.2 kg)   LMP 04/15/2015   SpO2 97%   BMI 30.66 kg/m²     Alert and oriented x3  HEENT NCAT  Extremities: No edema  Respiratory: unlabored breathing    Back: Lead insertion site and IPG site healed very well. Neuro: SERNA    Assessment/Plan:   Diagnosis Orders   1. DDD (degenerative disc disease), lumbar        2. Postlaminectomy syndrome, lumbar        3. Lumbar radicular pain        4. Lumbosacral spondylosis without myelopathy        5.  S/P insertion of spinal cord stimulator          S/P [employer] SCS lead and IPG implantation on 8/30/2022[facility]). SCS is helping her very well. Significant improvement in pain and functionality. She is over all very happy with the outcome. Instructions reviewed. Follow-up in 3-4 months.     Lashonda Noguera MD    CC:  Nano Bailey MD

## 2023-02-01 RX ORDER — TRAZODONE HYDROCHLORIDE 150 MG/1
TABLET ORAL
Qty: 90 TABLET | Refills: 1 | Status: SHIPPED | OUTPATIENT
Start: 2023-02-01

## 2023-05-24 ENCOUNTER — HOSPITAL ENCOUNTER (OUTPATIENT)
Age: 58
Discharge: HOME | End: 2023-05-24
Payer: COMMERCIAL

## 2023-05-24 LAB
ALBUMIN SERPL-MCNC: 3.9 G/DL (ref 3.5–5.2)
ALP SERPL-CCNC: 70 U/L (ref 35–104)
ALT SERPL-CCNC: 8 U/L (ref 0–32)
ANION GAP SERPL CALCULATED.3IONS-SCNC: 13 MMOL/L (ref 7–16)
AST SERPL-CCNC: 15 U/L (ref 0–31)
BILIRUB SERPL-MCNC: 0.6 MG/DL (ref 0–1.2)
BUN SERPL-MCNC: 20 MG/DL (ref 6–20)
CALCIUM SERPL-MCNC: 9 MG/DL (ref 8.6–10.2)
CHLORIDE SERPL-SCNC: 105 MMOL/L (ref 98–107)
CO2 SERPL-SCNC: 23 MMOL/L (ref 22–29)
CREAT SERPL-MCNC: 1.5 MG/DL (ref 0.5–1)
ERYTHROCYTE [DISTWIDTH] IN BLOOD BY AUTOMATED COUNT: 12.5 FL (ref 11.5–15)
GLUCOSE SERPL-MCNC: 185 MG/DL (ref 74–99)
HCT VFR BLD AUTO: 50.4 % (ref 34–48)
HGB BLD-MCNC: 17.2 G/DL (ref 11.5–15.5)
MCH RBC QN AUTO: 28.2 PG (ref 26–35)
MCHC RBC AUTO-ENTMCNC: 34.1 % (ref 32–34.5)
MCV RBC AUTO: 82.5 FL (ref 80–99.9)
PHOSPHATE SERPL-MCNC: 3.3 MG/DL (ref 2.5–4.5)
PLATELET # BLD AUTO: 155 E9/L (ref 130–450)
PMV BLD AUTO: 11.2 FL (ref 7–12)
POTASSIUM SERPL-SCNC: 4.3 MMOL/L (ref 3.5–5)
PROT SERPL-MCNC: 6.4 G/DL (ref 6.4–8.3)
PTH-INTACT SERPL-MCNC: 76 PG/ML (ref 15–65)
RBC # BLD AUTO: 6.11 E12/L (ref 3.5–5.5)
SODIUM SERPL-SCNC: 141 MMOL/L (ref 132–146)
VITAMIN D 25-HYDROXY: 30 NG/ML (ref 30–100)
WBC # BLD: 7.9 E9/L (ref 4.5–11.5)

## 2023-05-24 PROCEDURE — 83970 ASSAY OF PARATHORMONE: CPT

## 2023-05-24 PROCEDURE — 85027 COMPLETE CBC AUTOMATED: CPT

## 2023-05-24 PROCEDURE — 82306 VITAMIN D 25 HYDROXY: CPT

## 2023-05-24 PROCEDURE — 36415 COLL VENOUS BLD VENIPUNCTURE: CPT

## 2023-05-24 PROCEDURE — 84100 ASSAY OF PHOSPHORUS: CPT

## 2023-05-24 PROCEDURE — 80053 COMPREHEN METABOLIC PANEL: CPT

## 2023-06-21 ENCOUNTER — OFFICE VISIT (OUTPATIENT)
Dept: PAIN MANAGEMENT | Age: 58
End: 2023-06-21
Payer: COMMERCIAL

## 2023-06-21 VITALS
HEIGHT: 60 IN | HEART RATE: 76 BPM | OXYGEN SATURATION: 97 % | DIASTOLIC BLOOD PRESSURE: 66 MMHG | RESPIRATION RATE: 18 BRPM | BODY MASS INDEX: 30.82 KG/M2 | SYSTOLIC BLOOD PRESSURE: 108 MMHG | WEIGHT: 157 LBS | TEMPERATURE: 97.5 F

## 2023-06-21 DIAGNOSIS — Z96.89 S/P INSERTION OF SPINAL CORD STIMULATOR: ICD-10-CM

## 2023-06-21 DIAGNOSIS — M54.16 LUMBAR RADICULAR PAIN: ICD-10-CM

## 2023-06-21 DIAGNOSIS — M96.1 POSTLAMINECTOMY SYNDROME, LUMBAR: Primary | ICD-10-CM

## 2023-06-21 DIAGNOSIS — M51.36 DDD (DEGENERATIVE DISC DISEASE), LUMBAR: ICD-10-CM

## 2023-06-21 PROCEDURE — 4004F PT TOBACCO SCREEN RCVD TLK: CPT | Performed by: ANESTHESIOLOGY

## 2023-06-21 PROCEDURE — 99213 OFFICE O/P EST LOW 20 MIN: CPT | Performed by: ANESTHESIOLOGY

## 2023-06-21 PROCEDURE — G8417 CALC BMI ABV UP PARAM F/U: HCPCS | Performed by: ANESTHESIOLOGY

## 2023-06-21 PROCEDURE — 3017F COLORECTAL CA SCREEN DOC REV: CPT | Performed by: ANESTHESIOLOGY

## 2023-06-21 PROCEDURE — G8428 CUR MEDS NOT DOCUMENT: HCPCS | Performed by: ANESTHESIOLOGY

## 2023-06-21 NOTE — PROGRESS NOTES
Ana Cristina Menendez presents to the [facility] on 6/21/2023. Verónica Steele is complaining of pain in her lower back that radiates to BLE. The pain is constant. The pain is described as aching, throbbing, shooting, and burning. Pain is rated on her best day at a 4, on her worst day at a 6, and on average at a 4 on the VAS scale. She took her last dose of  (not taking anything for pain)  . What number best describes how, during the past week, pain has interfered with your enjoyment of life 4    What number best describes how, during the past week, pain has interfered with your general activity 4    Any procedures since your last visit: No,     She is not on NSAIDS and  is not on anticoagulation medications to include none and is managed by NA. Pacemaker or defibrillator: No Physician managing device is NA. Medication Contract and Consent for Opioid Use Documents Filed        No documents found                       /66   Pulse 76   Temp 97.5 °F (36.4 °C) (Infrared)   Resp 18   Ht 5' (1.524 m)   Wt 157 lb (71.2 kg)   LMP 04/15/2015   SpO2 97%   BMI 30.66 kg/m²      Patient's last menstrual period was 04/15/2015.
Frequency of Social Gatherings with Friends and Family: More than three times a week    Attends Synagogue Services: Never    Active Member of Clubs or Organizations: No    Attends Club or Organization Meetings: Never    Marital Status:    Intimate Partner Violence: Unknown    Fear of Current or Ex-Partner: No    Emotionally Abused: No    Physically Abused: Not on file    Sexually Abused: No   Housing Stability: Low Risk     Unable to Pay for Housing in the Last Year: No    Number of Places Lived in the Last Year: 1    Unstable Housing in the Last Year: No       Family History   Problem Relation Age of Onset    Heart Disease Mother     Arthritis Mother     Diabetes Mother     High Blood Pressure Mother     Kidney Disease Mother     Heart Disease Father     High Blood Pressure Father     Arthritis Father     Stroke Father     Diabetes Sister     Arthritis Maternal Uncle     Diabetes Maternal Uncle     Kidney Disease Maternal Uncle     Arthritis Maternal Grandmother        REVIEW OF SYSTEMS:     Hollie Avila denies fever/chills, chest pain, shortness of breath, new bowel or bladder complaints. All other review of systems was negative. PHYSICAL EXAMINATION:      /66   Pulse 76   Temp 97.5 °F (36.4 °C) (Infrared)   Resp 18   Ht 5' (1.524 m)   Wt 157 lb (71.2 kg)   LMP 04/15/2015   SpO2 97%   BMI 30.66 kg/m²     Alert and oriented x3  HEENT NCAT  Extremities: No edema  Respiratory: unlabored breathing    Back: Lead insertion site and IPG site healed very well. Neuro: SERNA    Assessment/Plan:    Diagnosis Orders   1. DDD (degenerative disc disease), lumbar          2. Postlaminectomy syndrome, lumbar          3. Lumbar radicular pain          4. Lumbosacral spondylosis without myelopathy          5. S/P insertion of spinal cord stimulator       S/P [employer] SCS lead and IPG implantation on 8/30/2022 [facility]). SCS is helping her very well.  Significant improvement in pain and

## 2023-06-30 ENCOUNTER — OFFICE VISIT (OUTPATIENT)
Dept: PAIN MANAGEMENT | Age: 58
End: 2023-06-30
Payer: COMMERCIAL

## 2023-06-30 VITALS
DIASTOLIC BLOOD PRESSURE: 80 MMHG | OXYGEN SATURATION: 96 % | WEIGHT: 157 LBS | SYSTOLIC BLOOD PRESSURE: 122 MMHG | RESPIRATION RATE: 16 BRPM | TEMPERATURE: 97.6 F | BODY MASS INDEX: 30.82 KG/M2 | HEART RATE: 72 BPM | HEIGHT: 60 IN

## 2023-06-30 DIAGNOSIS — Z96.89 S/P INSERTION OF SPINAL CORD STIMULATOR: ICD-10-CM

## 2023-06-30 DIAGNOSIS — M96.1 POSTLAMINECTOMY SYNDROME, LUMBAR: Primary | ICD-10-CM

## 2023-06-30 DIAGNOSIS — M54.16 LUMBAR RADICULAR PAIN: ICD-10-CM

## 2023-06-30 DIAGNOSIS — M51.36 DDD (DEGENERATIVE DISC DISEASE), LUMBAR: ICD-10-CM

## 2023-06-30 PROCEDURE — 95972 ALYS CPLX SP/PN NPGT W/PRGRM: CPT | Performed by: ANESTHESIOLOGY

## 2023-07-03 NOTE — TELEPHONE ENCOUNTER
Last Appointment:  7/7/2022  Future Appointments   Date Time Provider [address]   12/15/2023 11:30 AM Sudhir Myers MD [facility]

## 2023-07-05 RX ORDER — TRAZODONE HYDROCHLORIDE 150 MG/1
TABLET ORAL
Qty: 90 TABLET | Refills: 1 | Status: SHIPPED | OUTPATIENT
Start: 2023-07-05

## 2023-09-24 ENCOUNTER — TELEPHONE (OUTPATIENT)
Dept: FAMILY MEDICINE CLINIC | Age: 58
End: 2023-09-24

## 2023-09-24 RX ORDER — TRAZODONE HYDROCHLORIDE 150 MG/1
TABLET ORAL
Qty: 90 TABLET | Refills: 1 | Status: CANCELLED | OUTPATIENT
Start: 2023-09-24

## 2023-09-28 NOTE — TELEPHONE ENCOUNTER
traZODone (DESYREL) 150 MG tablet [Dr. Matti Doan MD]     Preferred pharmacy: [facility] [address] [facility]

## 2023-11-01 LAB
25(OH)D3 SERPL-MCNC: 29.6 NG/ML (ref 30–100)
ALBUMIN SERPL-MCNC: 3.8 G/DL (ref 3.5–5.2)
ALP SERPL-CCNC: 65 U/L (ref 35–104)
ALT SERPL-CCNC: 10 U/L (ref 0–32)
ANION GAP SERPL CALCULATED.3IONS-SCNC: 14 MMOL/L (ref 7–16)
AST SERPL-CCNC: 18 U/L (ref 0–31)
BILIRUB SERPL-MCNC: 0.5 MG/DL (ref 0–1.2)
BUN SERPL-MCNC: 20 MG/DL (ref 6–20)
CALCIUM SERPL-MCNC: 9.1 MG/DL (ref 8.6–10.2)
CHLORIDE SERPL-SCNC: 105 MMOL/L (ref 98–107)
CO2 SERPL-SCNC: 22 MMOL/L (ref 22–29)
CREAT SERPL-MCNC: 1.4 MG/DL (ref 0.5–1)
ERYTHROCYTE [DISTWIDTH] IN BLOOD BY AUTOMATED COUNT: 12.6 % (ref 11.5–15)
GFR SERPL CREATININE-BSD FRML MDRD: 45 ML/MIN/1.73M2
GLUCOSE SERPL-MCNC: 118 MG/DL (ref 74–99)
HCT VFR BLD AUTO: 44.5 % (ref 34–48)
HGB BLD-MCNC: 14.3 G/DL (ref 11.5–15.5)
MCH RBC QN AUTO: 27.6 PG (ref 26–35)
MCHC RBC AUTO-ENTMCNC: 32.1 G/DL (ref 32–34.5)
MCV RBC AUTO: 85.7 FL (ref 80–99.9)
PHOSPHATE SERPL-MCNC: 3 MG/DL (ref 2.5–4.5)
PLATELET # BLD AUTO: 139 K/UL (ref 130–450)
PMV BLD AUTO: 12.3 FL (ref 7–12)
POTASSIUM SERPL-SCNC: 4.4 MMOL/L (ref 3.5–5)
PROT SERPL-MCNC: 6.3 G/DL (ref 6.4–8.3)
PTH-INTACT SERPL-MCNC: 49.3 PG/ML (ref 15–65)
RBC # BLD AUTO: 5.19 M/UL (ref 3.5–5.5)
SODIUM SERPL-SCNC: 141 MMOL/L (ref 132–146)
WBC OTHER # BLD: 5.3 K/UL (ref 4.5–11.5)

## 2023-12-05 NOTE — PROGRESS NOTES
Patient:  YULISSA Leonard 1965  Date of Service:  10/13/21      Do you currently have any of the following:    Fever: No  Headache:  No  Cough: No  Shortness of breath: No  Exposed to anyone with these symptoms: No       Patient presents with complaints of lower back and bilateral leg pain that started 30 years ago and has been getting worse. She states the pain began following No specific cause    Pain is constant and is described as aching, throbbing, shooting, sharp and burning. She rates the pain as a 10/10 on her worst day , 8/10 on her best day, and a 8/10 on average on the VAS scale. Pain does radiate to both lower extremities. She  has numbness, tingling, burning of the both lower extremities. Alleviating factors include: nothing. Aggravating factors include:  movement, walking, standing, sitting, bending, lying down, lifting, touching. She states that the pain does keep her from sleeping at night. She took her last dose of Neurontin . She is not on NSAIDS and  is not on anticoagulation medications to include none and is managed by NA. Previous treatments: Physical Therapy, Nerve block, Epidural Steroid Injection, Surgery 2013 Dr. Rafita Crowley and medications. .      Personal Expectations from this treatment: increase activity and decrease pain    /82   Pulse 75   Temp 97.3 °F (36.3 °C) (Infrared)   Resp 16   Ht 5' (1.524 m)   Wt 153 lb (69.4 kg)   LMP 04/15/2015   SpO2 98%   BMI 29.88 kg/m²     Patient's last menstrual period was 04/15/2015. Transitioned to [facility] and temperatures have remained stable. Tolerating PO feedings. Took feedings 100% PO. Voiding and stooling. Bath done. Hep B administered after verbal consent given. Labs drawn and sent. Glucose has remained WNL. Both parents in to visit this shift and participated in cares.

## 2023-12-15 ENCOUNTER — OFFICE VISIT (OUTPATIENT)
Dept: PAIN MANAGEMENT | Age: 58
End: 2023-12-15
Payer: COMMERCIAL

## 2023-12-15 VITALS
BODY MASS INDEX: 30.82 KG/M2 | WEIGHT: 157 LBS | OXYGEN SATURATION: 97 % | SYSTOLIC BLOOD PRESSURE: 128 MMHG | HEART RATE: 65 BPM | RESPIRATION RATE: 18 BRPM | HEIGHT: 60 IN | TEMPERATURE: 97.5 F | DIASTOLIC BLOOD PRESSURE: 84 MMHG

## 2023-12-15 DIAGNOSIS — M54.16 LUMBAR RADICULAR PAIN: ICD-10-CM

## 2023-12-15 DIAGNOSIS — M47.817 LUMBOSACRAL SPONDYLOSIS WITHOUT MYELOPATHY: ICD-10-CM

## 2023-12-15 DIAGNOSIS — Z96.89 S/P INSERTION OF SPINAL CORD STIMULATOR: ICD-10-CM

## 2023-12-15 DIAGNOSIS — M96.1 POSTLAMINECTOMY SYNDROME, LUMBAR: Primary | ICD-10-CM

## 2023-12-15 DIAGNOSIS — M51.36 DDD (DEGENERATIVE DISC DISEASE), LUMBAR: ICD-10-CM

## 2023-12-15 PROCEDURE — 99213 OFFICE O/P EST LOW 20 MIN: CPT | Performed by: ANESTHESIOLOGY

## 2023-12-15 PROCEDURE — G8484 FLU IMMUNIZE NO ADMIN: HCPCS | Performed by: ANESTHESIOLOGY

## 2023-12-15 PROCEDURE — 4004F PT TOBACCO SCREEN RCVD TLK: CPT | Performed by: ANESTHESIOLOGY

## 2023-12-15 PROCEDURE — G8428 CUR MEDS NOT DOCUMENT: HCPCS | Performed by: ANESTHESIOLOGY

## 2023-12-15 PROCEDURE — G8417 CALC BMI ABV UP PARAM F/U: HCPCS | Performed by: ANESTHESIOLOGY

## 2023-12-15 PROCEDURE — 3017F COLORECTAL CA SCREEN DOC REV: CPT | Performed by: ANESTHESIOLOGY

## 2023-12-15 RX ORDER — GABAPENTIN 600 MG/1
600 TABLET ORAL DAILY
COMMUNITY

## 2023-12-15 NOTE — PROGRESS NOTES
[address], [address] [place] [address], [address] [place] [address]  [phone]    Follow up Note      Sindi Becerra     Date of Visit:  12/15/2023    CC:  Patient presents for follow up   Chief Complaint   Patient presents with    Back Pain       HPI:    H/o Chronic low back pain. S/P SCS lead and IPG implantation on 8/30/2022. SCS is helping her very well. Significant improvement in pain and functionality. Nursing notes and details of the pain history reviewed. Please see intake notes for details. Prior treatment:   PT  Meds  Surgery  SCS    Past Medical History:   Diagnosis Date    Chronic back pain     Chronic kidney disease     stage 3  follows with nephrology    Dog bite of thumb     right    Low back pain        Past Surgical History:   Procedure Laterality Date    CARPAL TUNNEL RELEASE Bilateral     CHOLECYSTECTOMY      HAND SURGERY Right 4/13/2022    RIGHT THUMB EXPLORATION WITH EXCISIONAL DEBRIDEMENT and NEUROLYSIS performed by Aniket Ramsey MD at [address] [place] [address]  02/01/2013    PLIF  L5 - S1    NOSE SURGERY      four nose surgeries   ( d/t a fractures)    PAIN MANAGEMENT PROCEDURE N/A 02/01/2022    LUMBAR TRANSFORAMINAL EPIDURAL STEROID INJECTION LEFT L3 &RIGHT L4 UNDER XRAY GUIDANCE (79951) performed by Miguel Jara MD at [address] N/A 8/2/2022    SPINAL CORD STIMULATOR TRIAL WITH [employer] performed by Miguel Jara MD at [address] N/A 8/30/2022    SPINAL CORD STIMULATOR IMPLANT WITH [employer] (CPT 80553) performed by Miguel Jara MD at [address] [place] [address] Right 02/09/2018    ROTATOR CUFF REPAIR Right 2008    R rotator cuff repair    SHOULDER ARTHROSCOPY Right 02/21/2020    RIGHT SHOULDER ARTHROSCOPY.  SUBACROMIAL DECOMPRESSION, LABRAL AND BICEP  DEBRIDEMENT, ROTATOR CUFF REPAIR performed by Melyssa Rodriguez DO at [address] [place] [address]

## 2024-01-02 RX ORDER — TRAZODONE HYDROCHLORIDE 150 MG/1
TABLET ORAL
Qty: 90 TABLET | Refills: 1 | Status: SHIPPED | OUTPATIENT
Start: 2024-01-02

## 2024-02-02 ENCOUNTER — OFFICE VISIT (OUTPATIENT)
Dept: PAIN MANAGEMENT | Age: 59
End: 2024-02-02
Payer: COMMERCIAL

## 2024-02-02 VITALS
TEMPERATURE: 96.6 F | DIASTOLIC BLOOD PRESSURE: 82 MMHG | HEIGHT: 60 IN | RESPIRATION RATE: 18 BRPM | HEART RATE: 64 BPM | SYSTOLIC BLOOD PRESSURE: 118 MMHG | OXYGEN SATURATION: 97 % | BODY MASS INDEX: 30.82 KG/M2 | WEIGHT: 157 LBS

## 2024-02-02 DIAGNOSIS — M54.16 LUMBAR RADICULAR PAIN: ICD-10-CM

## 2024-02-02 DIAGNOSIS — M96.1 POSTLAMINECTOMY SYNDROME, LUMBAR: Primary | ICD-10-CM

## 2024-02-02 DIAGNOSIS — Z96.89 S/P INSERTION OF SPINAL CORD STIMULATOR: ICD-10-CM

## 2024-02-02 PROCEDURE — 99213 OFFICE O/P EST LOW 20 MIN: CPT | Performed by: ANESTHESIOLOGY

## 2024-02-02 PROCEDURE — 95972 ALYS CPLX SP/PN NPGT W/PRGRM: CPT | Performed by: ANESTHESIOLOGY

## 2024-02-02 NOTE — PROGRESS NOTES
Leena Ye presents to the [facility] on 2/2/2024. Leena is complaining of pain low back, radiates to BLE. The pain is constant. The pain is described as aching, throbbing, and penetrating. Pain is rated on her best day at a 5, on her worst day at a 10, and on average at a 7 on the VAS scale. She took her last dose of  none  N/A.      Any procedures since your last visit: No    She is not on NSAIDS and  is not on anticoagulation medications to include none  Pacemaker or defibrillator: No   Medication Contract and Consent for Opioid Use Documents Filed        No documents found                       Resp 18   Ht 1.524 m (5')   Wt 71.2 kg (157 lb)   LMP 04/15/2015   BMI 30.66 kg/m²      Patient's last menstrual period was 04/15/2015.

## 2024-02-02 NOTE — PROGRESS NOTES
[facility]  [address], [place] [address]  [phone]    Follow up Note      Leena HATCH Ephraim     Date of Visit:  2/2/2024    CC:  Patient presents for follow up   Chief Complaint   Patient presents with    Back Pain     S/P SCS 1 1/2 years       HPI:    H/o Chronic low back pain.     S/P SCS lead and IPG implantation on 8/30/2022.    SCS is helping her very well. Significant improvement in pain and functionality.    Nursing notes and details of the pain history reviewed. Please see intake notes for details.    Prior treatment:   PT  Meds  Surgery  SCS    Past Medical History:   Diagnosis Date    Chronic back pain     Chronic kidney disease     stage 3  follows with nephrology    Dog bite of thumb     right    Low back pain        Past Surgical History:   Procedure Laterality Date    CARPAL TUNNEL RELEASE Bilateral     CHOLECYSTECTOMY      HAND SURGERY Right 4/13/2022    RIGHT THUMB EXPLORATION WITH EXCISIONAL DEBRIDEMENT and NEUROLYSIS performed by Aditya Hopper MD at [facility] OR    LUMBAR FUSION  02/01/2013    PLIF  L5 - S1    NOSE SURGERY      four nose surgeries   ( d/t a fractures)    PAIN MANAGEMENT PROCEDURE N/A 02/01/2022    LUMBAR TRANSFORAMINAL EPIDURAL STEROID INJECTION LEFT L3 &RIGHT L4 UNDER XRAY GUIDANCE (42456) performed by Steffen Polk MD at [facility] OR    PAIN MANAGEMENT PROCEDURE N/A 8/2/2022    SPINAL CORD STIMULATOR TRIAL WITH [employer] performed by Steffen Polk MD at [facility] OR    PAIN MANAGEMENT PROCEDURE N/A 8/30/2022    SPINAL CORD STIMULATOR IMPLANT WITH [employer] (CPT 68902) performed by Steffen Polk MD at [facility] OR    PARATHYROIDECTOMY Right 02/09/2018    ROTATOR CUFF REPAIR Right 2008    R rotator cuff repair    SHOULDER ARTHROSCOPY Right 02/21/2020    RIGHT SHOULDER ARTHROSCOPY. SUBACROMIAL DECOMPRESSION, LABRAL AND BICEP  DEBRIDEMENT, ROTATOR CUFF REPAIR performed by Everette Zimmer,

## 2024-02-09 RX ORDER — SODIUM CHLORIDE 0.9 % (FLUSH) 0.9 %
5-40 SYRINGE (ML) INJECTION EVERY 12 HOURS SCHEDULED
OUTPATIENT
Start: 2024-02-09

## 2024-02-09 RX ORDER — SODIUM CHLORIDE 9 MG/ML
INJECTION, SOLUTION INTRAVENOUS PRN
OUTPATIENT
Start: 2024-02-09

## 2024-02-09 RX ORDER — SODIUM CHLORIDE 0.9 % (FLUSH) 0.9 %
5-40 SYRINGE (ML) INJECTION PRN
OUTPATIENT
Start: 2024-02-09

## 2024-02-09 RX ORDER — SODIUM CHLORIDE 9 MG/ML
INJECTION, SOLUTION INTRAVENOUS CONTINUOUS
OUTPATIENT
Start: 2024-02-09

## 2024-02-12 ENCOUNTER — HOSPITAL ENCOUNTER (OUTPATIENT)
Dept: PREADMISSION TESTING | Age: 59
Discharge: HOME | End: 2024-02-12

## 2024-02-12 DIAGNOSIS — Z01.818 PRE-OP TESTING: Primary | ICD-10-CM

## 2024-02-12 NOTE — PROGRESS NOTES
Pt did not show for PAT appt, phone number [phone] is a fast busy, dr loi crook office does not have any other phone number for pt.

## 2024-03-15 RX ORDER — TRAZODONE HYDROCHLORIDE 150 MG/1
TABLET ORAL
Qty: 90 TABLET | Refills: 1 | OUTPATIENT
Start: 2024-03-15

## 2024-03-20 ENCOUNTER — TRANSCRIBE ORDERS (OUTPATIENT)
Dept: ADMINISTRATIVE | Age: 59
End: 2024-03-20

## 2024-03-20 DIAGNOSIS — Z12.31 SCREENING MAMMOGRAM FOR HIGH-RISK PATIENT: Primary | ICD-10-CM

## 2024-03-21 RX ORDER — SODIUM CHLORIDE, SODIUM LACTATE, POTASSIUM CHLORIDE, CALCIUM CHLORIDE 600; 310; 30; 20 MG/100ML; MG/100ML; MG/100ML; MG/100ML
INJECTION, SOLUTION INTRAVENOUS CONTINUOUS
OUTPATIENT
Start: 2024-04-05

## 2024-03-28 ENCOUNTER — HOSPITAL ENCOUNTER (OUTPATIENT)
Dept: PREADMISSION TESTING | Age: 59
Discharge: HOME | End: 2024-03-28
Payer: COMMERCIAL

## 2024-03-28 VITALS
TEMPERATURE: 97.4 F | HEIGHT: 60 IN | HEART RATE: 60 BPM | SYSTOLIC BLOOD PRESSURE: 115 MMHG | OXYGEN SATURATION: 99 % | BODY MASS INDEX: 33.38 KG/M2 | DIASTOLIC BLOOD PRESSURE: 80 MMHG | WEIGHT: 170 LBS | RESPIRATION RATE: 18 BRPM

## 2024-03-28 DIAGNOSIS — Z01.818 PRE-OP TESTING: ICD-10-CM

## 2024-03-28 LAB
ABO + RH BLD: NORMAL
ANION GAP SERPL CALCULATED.3IONS-SCNC: 9 MMOL/L (ref 7–16)
ARM BAND NUMBER: NORMAL
BASOPHILS # BLD: 0.03 K/UL (ref 0–0.2)
BASOPHILS NFR BLD: 1 % (ref 0–2)
BLOOD BANK SAMPLE EXPIRATION: NORMAL
BLOOD GROUP ANTIBODIES SERPL: NEGATIVE
BUN SERPL-MCNC: 20 MG/DL (ref 6–20)
CALCIUM SERPL-MCNC: 9.2 MG/DL (ref 8.6–10.2)
CHLORIDE SERPL-SCNC: 103 MMOL/L (ref 98–107)
CO2 SERPL-SCNC: 29 MMOL/L (ref 22–29)
CREAT SERPL-MCNC: 1.3 MG/DL (ref 0.5–1)
EKG ATRIAL RATE: 62 BPM
EKG P AXIS: 59 DEGREES
EKG P-R INTERVAL: 150 MS
EKG Q-T INTERVAL: 442 MS
EKG QRS DURATION: 82 MS
EKG QTC CALCULATION (BAZETT): 448 MS
EKG R AXIS: -7 DEGREES
EKG T AXIS: 31 DEGREES
EKG VENTRICULAR RATE: 62 BPM
EOSINOPHIL # BLD: 0.1 K/UL (ref 0.05–0.5)
EOSINOPHILS RELATIVE PERCENT: 2 % (ref 0–6)
ERYTHROCYTE [DISTWIDTH] IN BLOOD BY AUTOMATED COUNT: 12.3 % (ref 11.5–15)
GFR SERPL CREATININE-BSD FRML MDRD: 47 ML/MIN/1.73M2
GLUCOSE SERPL-MCNC: 117 MG/DL (ref 74–99)
HCT VFR BLD AUTO: 40.9 % (ref 34–48)
HGB BLD-MCNC: 13.8 G/DL (ref 11.5–15.5)
IMM GRANULOCYTES # BLD AUTO: <0.03 K/UL (ref 0–0.58)
IMM GRANULOCYTES NFR BLD: 0 % (ref 0–5)
LYMPHOCYTES NFR BLD: 1.31 K/UL (ref 1.5–4)
LYMPHOCYTES RELATIVE PERCENT: 31 % (ref 20–42)
MCH RBC QN AUTO: 27.7 PG (ref 26–35)
MCHC RBC AUTO-ENTMCNC: 33.7 G/DL (ref 32–34.5)
MCV RBC AUTO: 82.1 FL (ref 80–99.9)
MONOCYTES NFR BLD: 0.37 K/UL (ref 0.1–0.95)
MONOCYTES NFR BLD: 9 % (ref 2–12)
NEUTROPHILS NFR BLD: 57 % (ref 43–80)
NEUTS SEG NFR BLD: 2.4 K/UL (ref 1.8–7.3)
PLATELET # BLD AUTO: 150 K/UL (ref 130–450)
PMV BLD AUTO: 11.1 FL (ref 7–12)
POTASSIUM SERPL-SCNC: 4.2 MMOL/L (ref 3.5–5)
RBC # BLD AUTO: 4.98 M/UL (ref 3.5–5.5)
SODIUM SERPL-SCNC: 141 MMOL/L (ref 132–146)
WBC OTHER # BLD: 4.2 K/UL (ref 4.5–11.5)

## 2024-03-28 PROCEDURE — 86900 BLOOD TYPING SEROLOGIC ABO: CPT

## 2024-03-28 PROCEDURE — 93005 ELECTROCARDIOGRAM TRACING: CPT | Performed by: ANESTHESIOLOGY

## 2024-03-28 PROCEDURE — 85025 COMPLETE CBC W/AUTO DIFF WBC: CPT

## 2024-03-28 PROCEDURE — 86850 RBC ANTIBODY SCREEN: CPT

## 2024-03-28 PROCEDURE — 80048 BASIC METABOLIC PNL TOTAL CA: CPT

## 2024-03-28 PROCEDURE — 86901 BLOOD TYPING SEROLOGIC RH(D): CPT

## 2024-03-28 NOTE — PROGRESS NOTES
[facility]                                                                                                                    PRE OP INSTRUCTIONS FOR  Leena Ye        Date: 3/28/2024    Date of surgery: 4-5-24   Arrival Time: Hospital will call you on 4-4-24 between 5pm and 7pm with your final arrival time for surgery. Go to     front of hospital and check in at information desk.    Nothing by mouth (NPO) as instructed. May have clear liquids up to 2 hours prior to surgery. Nothing solid after midnight. Examples: water, apple juice, black coffee, plain tea    Take the following medications with a small sip of water on the morning of Surgery: gabapentin prn      Diabetics may take half the evening dose of insulin but none after midnight.  If you feel symptomatic or have low blood sugar morning of surgery drink 1-2 ounces of apple juice only. If you take a weekly insulin injection _______________, stop 7 days prior to surgery. If you take _______________, stop 3-4 days prior to surgery.    Aspirin, Ibuprofen, Advil, Naproxen, other Anti-inflammatory products should be stopped before surgery as directed by your surgeon, cardiologist, or primary care Doctor. Herbal supplements and Vitamin E should be stopped five days prior.  May take Tylenol unless instructed otherwise by your surgeon.    Check with your Doctor regarding stopping Plavix, Coumadin, Lovenox, Eliquis, Effient, or other blood thinners such as, pradaxa, lixiana, xaralto and savaysa.    Do not smoke, vape, or use illicit drugs and do not drink any alcoholic beverages 24 hours prior to surgery.    You may brush your teeth the morning of surgery.      You MUST make arrangements for a responsible adult, 18 and over, to take you home after your surgery. You will not be allowed to leave alone or drive yourself home.  You will need someone stay with you the first 24 hrs. Your surgery will be cancelled if you do not have a ride

## 2024-03-29 ENCOUNTER — HOSPITAL ENCOUNTER (OUTPATIENT)
Dept: MAMMOGRAPHY | Age: 59
End: 2024-03-29
Attending: OBSTETRICS & GYNECOLOGY
Payer: COMMERCIAL

## 2024-03-29 VITALS — WEIGHT: 167 LBS | BODY MASS INDEX: 32.79 KG/M2 | HEIGHT: 60 IN

## 2024-03-29 DIAGNOSIS — Z12.31 SCREENING MAMMOGRAM FOR HIGH-RISK PATIENT: ICD-10-CM

## 2024-03-29 PROCEDURE — 77063 BREAST TOMOSYNTHESIS BI: CPT

## 2024-04-05 ENCOUNTER — HOSPITAL ENCOUNTER (OUTPATIENT)
Age: 59
Setting detail: OBSERVATION
Discharge: HOME | End: 2024-04-05
Attending: OBSTETRICS & GYNECOLOGY | Admitting: OBSTETRICS & GYNECOLOGY
Payer: COMMERCIAL

## 2024-04-05 ENCOUNTER — ANESTHESIA EVENT (OUTPATIENT)
Dept: OPERATING ROOM | Age: 59
End: 2024-04-05
Payer: COMMERCIAL

## 2024-04-05 ENCOUNTER — ANESTHESIA (OUTPATIENT)
Dept: OPERATING ROOM | Age: 59
End: 2024-04-05
Payer: COMMERCIAL

## 2024-04-05 VITALS
HEART RATE: 60 BPM | OXYGEN SATURATION: 98 % | TEMPERATURE: 97.4 F | SYSTOLIC BLOOD PRESSURE: 142 MMHG | DIASTOLIC BLOOD PRESSURE: 74 MMHG | RESPIRATION RATE: 18 BRPM

## 2024-04-05 DIAGNOSIS — N81.4 UTERINE PROLAPSE: ICD-10-CM

## 2024-04-05 DIAGNOSIS — Z01.818 PRE-OP TESTING: Primary | ICD-10-CM

## 2024-04-05 PROCEDURE — 2720000010 HC SURG SUPPLY STERILE: Performed by: OBSTETRICS & GYNECOLOGY

## 2024-04-05 PROCEDURE — 2500000003 HC RX 250 WO HCPCS

## 2024-04-05 PROCEDURE — 2709999900 HC NON-CHARGEABLE SUPPLY: Performed by: OBSTETRICS & GYNECOLOGY

## 2024-04-05 PROCEDURE — 6370000000 HC RX 637 (ALT 250 FOR IP): Performed by: ANESTHESIOLOGY

## 2024-04-05 PROCEDURE — 88305 TISSUE EXAM BY PATHOLOGIST: CPT

## 2024-04-05 PROCEDURE — 2580000003 HC RX 258

## 2024-04-05 PROCEDURE — 3600000004 HC SURGERY LEVEL 4 BASE: Performed by: OBSTETRICS & GYNECOLOGY

## 2024-04-05 PROCEDURE — 6360000002 HC RX W HCPCS: Performed by: OBSTETRICS & GYNECOLOGY

## 2024-04-05 PROCEDURE — 7100000010 HC PHASE II RECOVERY - FIRST 15 MIN: Performed by: OBSTETRICS & GYNECOLOGY

## 2024-04-05 PROCEDURE — 7100000011 HC PHASE II RECOVERY - ADDTL 15 MIN: Performed by: OBSTETRICS & GYNECOLOGY

## 2024-04-05 PROCEDURE — 7100000001 HC PACU RECOVERY - ADDTL 15 MIN: Performed by: OBSTETRICS & GYNECOLOGY

## 2024-04-05 PROCEDURE — 2580000003 HC RX 258: Performed by: ANESTHESIOLOGY

## 2024-04-05 PROCEDURE — 3600000014 HC SURGERY LEVEL 4 ADDTL 15MIN: Performed by: OBSTETRICS & GYNECOLOGY

## 2024-04-05 PROCEDURE — 6360000002 HC RX W HCPCS

## 2024-04-05 PROCEDURE — 3700000000 HC ANESTHESIA ATTENDED CARE: Performed by: OBSTETRICS & GYNECOLOGY

## 2024-04-05 PROCEDURE — 7100000000 HC PACU RECOVERY - FIRST 15 MIN: Performed by: OBSTETRICS & GYNECOLOGY

## 2024-04-05 PROCEDURE — 2500000003 HC RX 250 WO HCPCS: Performed by: OBSTETRICS & GYNECOLOGY

## 2024-04-05 PROCEDURE — 3700000001 HC ADD 15 MINUTES (ANESTHESIA): Performed by: OBSTETRICS & GYNECOLOGY

## 2024-04-05 RX ORDER — LEVOFLOXACIN 5 MG/ML
500 INJECTION, SOLUTION INTRAVENOUS
Status: COMPLETED | OUTPATIENT
Start: 2024-04-05 | End: 2024-04-05

## 2024-04-05 RX ORDER — ONDANSETRON 2 MG/ML
4 INJECTION INTRAMUSCULAR; INTRAVENOUS
Status: DISCONTINUED | OUTPATIENT
Start: 2024-04-05 | End: 2024-04-05 | Stop reason: HOSPADM

## 2024-04-05 RX ORDER — SODIUM CHLORIDE, SODIUM LACTATE, POTASSIUM CHLORIDE, CALCIUM CHLORIDE 600; 310; 30; 20 MG/100ML; MG/100ML; MG/100ML; MG/100ML
INJECTION, SOLUTION INTRAVENOUS CONTINUOUS PRN
Status: DISCONTINUED | OUTPATIENT
Start: 2024-04-05 | End: 2024-04-05 | Stop reason: SDUPTHER

## 2024-04-05 RX ORDER — SODIUM CHLORIDE 0.9 % (FLUSH) 0.9 %
5-40 SYRINGE (ML) INJECTION PRN
Status: DISCONTINUED | OUTPATIENT
Start: 2024-04-05 | End: 2024-04-05 | Stop reason: HOSPADM

## 2024-04-05 RX ORDER — SODIUM CHLORIDE 0.9 % (FLUSH) 0.9 %
5-40 SYRINGE (ML) INJECTION PRN
Status: CANCELLED | OUTPATIENT
Start: 2024-04-05

## 2024-04-05 RX ORDER — MIDAZOLAM HYDROCHLORIDE 1 MG/ML
INJECTION INTRAMUSCULAR; INTRAVENOUS PRN
Status: DISCONTINUED | OUTPATIENT
Start: 2024-04-05 | End: 2024-04-05 | Stop reason: SDUPTHER

## 2024-04-05 RX ORDER — SODIUM CHLORIDE 9 MG/ML
INJECTION, SOLUTION INTRAVENOUS PRN
Status: DISCONTINUED | OUTPATIENT
Start: 2024-04-05 | End: 2024-04-05 | Stop reason: HOSPADM

## 2024-04-05 RX ORDER — KETOROLAC TROMETHAMINE 30 MG/ML
30 INJECTION, SOLUTION INTRAMUSCULAR; INTRAVENOUS EVERY 6 HOURS
Status: DISCONTINUED | OUTPATIENT
Start: 2024-04-05 | End: 2024-04-05 | Stop reason: HOSPADM

## 2024-04-05 RX ORDER — FENTANYL CITRATE 50 UG/ML
INJECTION, SOLUTION INTRAMUSCULAR; INTRAVENOUS PRN
Status: DISCONTINUED | OUTPATIENT
Start: 2024-04-05 | End: 2024-04-05 | Stop reason: SDUPTHER

## 2024-04-05 RX ORDER — SODIUM CHLORIDE, SODIUM LACTATE, POTASSIUM CHLORIDE, CALCIUM CHLORIDE 600; 310; 30; 20 MG/100ML; MG/100ML; MG/100ML; MG/100ML
INJECTION, SOLUTION INTRAVENOUS CONTINUOUS
Status: DISCONTINUED | OUTPATIENT
Start: 2024-04-05 | End: 2024-04-05 | Stop reason: HOSPADM

## 2024-04-05 RX ORDER — ROCURONIUM BROMIDE 10 MG/ML
INJECTION, SOLUTION INTRAVENOUS PRN
Status: DISCONTINUED | OUTPATIENT
Start: 2024-04-05 | End: 2024-04-05 | Stop reason: SDUPTHER

## 2024-04-05 RX ORDER — LIDOCAINE HYDROCHLORIDE 20 MG/ML
INJECTION, SOLUTION INTRAVENOUS PRN
Status: DISCONTINUED | OUTPATIENT
Start: 2024-04-05 | End: 2024-04-05 | Stop reason: SDUPTHER

## 2024-04-05 RX ORDER — SODIUM CHLORIDE 0.9 % (FLUSH) 0.9 %
5-40 SYRINGE (ML) INJECTION EVERY 12 HOURS SCHEDULED
Status: DISCONTINUED | OUTPATIENT
Start: 2024-04-05 | End: 2024-04-05 | Stop reason: HOSPADM

## 2024-04-05 RX ORDER — ACETAMINOPHEN 500 MG
1000 TABLET ORAL EVERY 8 HOURS PRN
Status: CANCELLED | OUTPATIENT
Start: 2024-04-05

## 2024-04-05 RX ORDER — PROPOFOL 10 MG/ML
INJECTION, EMULSION INTRAVENOUS PRN
Status: DISCONTINUED | OUTPATIENT
Start: 2024-04-05 | End: 2024-04-05 | Stop reason: SDUPTHER

## 2024-04-05 RX ORDER — MIDAZOLAM HYDROCHLORIDE 1 MG/ML
1 INJECTION INTRAMUSCULAR; INTRAVENOUS
Status: DISCONTINUED | OUTPATIENT
Start: 2024-04-05 | End: 2024-04-05 | Stop reason: HOSPADM

## 2024-04-05 RX ORDER — SODIUM CHLORIDE 9 MG/ML
INJECTION, SOLUTION INTRAVENOUS PRN
Status: CANCELLED | OUTPATIENT
Start: 2024-04-05

## 2024-04-05 RX ORDER — DEXAMETHASONE SODIUM PHOSPHATE 10 MG/ML
INJECTION, SOLUTION INTRAMUSCULAR; INTRAVENOUS PRN
Status: DISCONTINUED | OUTPATIENT
Start: 2024-04-05 | End: 2024-04-05 | Stop reason: SDUPTHER

## 2024-04-05 RX ORDER — METRONIDAZOLE 500 MG/100ML
500 INJECTION, SOLUTION INTRAVENOUS
Status: COMPLETED | OUTPATIENT
Start: 2024-04-05 | End: 2024-04-05

## 2024-04-05 RX ORDER — ACETAMINOPHEN 500 MG
1000 TABLET ORAL ONCE
Status: COMPLETED | OUTPATIENT
Start: 2024-04-05 | End: 2024-04-05

## 2024-04-05 RX ORDER — SODIUM CHLORIDE 0.9 % (FLUSH) 0.9 %
5-40 SYRINGE (ML) INJECTION EVERY 12 HOURS SCHEDULED
Status: CANCELLED | OUTPATIENT
Start: 2024-04-05

## 2024-04-05 RX ORDER — GLYCOPYRROLATE 0.2 MG/ML
INJECTION INTRAMUSCULAR; INTRAVENOUS PRN
Status: DISCONTINUED | OUTPATIENT
Start: 2024-04-05 | End: 2024-04-05 | Stop reason: SDUPTHER

## 2024-04-05 RX ORDER — FENTANYL CITRATE 0.05 MG/ML
50 INJECTION, SOLUTION INTRAMUSCULAR; INTRAVENOUS EVERY 5 MIN PRN
Status: DISCONTINUED | OUTPATIENT
Start: 2024-04-05 | End: 2024-04-05 | Stop reason: HOSPADM

## 2024-04-05 RX ORDER — ONDANSETRON 2 MG/ML
INJECTION INTRAMUSCULAR; INTRAVENOUS PRN
Status: DISCONTINUED | OUTPATIENT
Start: 2024-04-05 | End: 2024-04-05 | Stop reason: SDUPTHER

## 2024-04-05 RX ORDER — LIDOCAINE HYDROCHLORIDE AND EPINEPHRINE 10; 10 MG/ML; UG/ML
INJECTION, SOLUTION INFILTRATION; PERINEURAL PRN
Status: DISCONTINUED | OUTPATIENT
Start: 2024-04-05 | End: 2024-04-05 | Stop reason: ALTCHOICE

## 2024-04-05 RX ORDER — NALOXONE HYDROCHLORIDE 0.4 MG/ML
INJECTION, SOLUTION INTRAMUSCULAR; INTRAVENOUS; SUBCUTANEOUS PRN
Status: DISCONTINUED | OUTPATIENT
Start: 2024-04-05 | End: 2024-04-05 | Stop reason: HOSPADM

## 2024-04-05 RX ADMIN — MIDAZOLAM 1 MG: 1 INJECTION INTRAMUSCULAR; INTRAVENOUS at 07:53

## 2024-04-05 RX ADMIN — ROCURONIUM BROMIDE 10 MG: 10 INJECTION, SOLUTION INTRAVENOUS at 08:11

## 2024-04-05 RX ADMIN — ROCURONIUM BROMIDE 40 MG: 10 INJECTION, SOLUTION INTRAVENOUS at 08:05

## 2024-04-05 RX ADMIN — SODIUM CHLORIDE, POTASSIUM CHLORIDE, SODIUM LACTATE AND CALCIUM CHLORIDE: 600; 310; 30; 20 INJECTION, SOLUTION INTRAVENOUS at 07:53

## 2024-04-05 RX ADMIN — LEVOFLOXACIN 500 MG: 5 INJECTION, SOLUTION INTRAVENOUS at 08:11

## 2024-04-05 RX ADMIN — METRONIDAZOLE 500 MG: 500 SOLUTION INTRAVENOUS at 08:11

## 2024-04-05 RX ADMIN — FENTANYL CITRATE 100 MCG: 50 INJECTION, SOLUTION INTRAMUSCULAR; INTRAVENOUS at 08:03

## 2024-04-05 RX ADMIN — SODIUM CHLORIDE, POTASSIUM CHLORIDE, SODIUM LACTATE AND CALCIUM CHLORIDE: 600; 310; 30; 20 INJECTION, SOLUTION INTRAVENOUS at 06:14

## 2024-04-05 RX ADMIN — PROPOFOL 40 MG: 10 INJECTION, EMULSION INTRAVENOUS at 09:15

## 2024-04-05 RX ADMIN — ACETAMINOPHEN 1000 MG: 500 TABLET ORAL at 12:16

## 2024-04-05 RX ADMIN — SUGAMMADEX 200 MG: 100 INJECTION, SOLUTION INTRAVENOUS at 09:14

## 2024-04-05 RX ADMIN — DEXAMETHASONE SODIUM PHOSPHATE 10 MG: 10 INJECTION, SOLUTION INTRAMUSCULAR; INTRAVENOUS at 08:11

## 2024-04-05 RX ADMIN — PROPOFOL 200 MG: 10 INJECTION, EMULSION INTRAVENOUS at 08:03

## 2024-04-05 RX ADMIN — ONDANSETRON 4 MG: 2 INJECTION INTRAMUSCULAR; INTRAVENOUS at 08:11

## 2024-04-05 RX ADMIN — GLYCOPYRROLATE 0.2 MG: 0.2 INJECTION, SOLUTION INTRAMUSCULAR; INTRAVENOUS at 08:03

## 2024-04-05 RX ADMIN — LIDOCAINE HYDROCHLORIDE 100 MG: 20 INJECTION, SOLUTION INTRAVENOUS at 08:03

## 2024-04-05 ASSESSMENT — PAIN SCALES - GENERAL
PAINLEVEL_OUTOF10: 4
PAINLEVEL_OUTOF10: 2

## 2024-04-05 ASSESSMENT — PAIN DESCRIPTION - LOCATION
LOCATION: ABDOMEN
LOCATION: ABDOMEN

## 2024-04-05 ASSESSMENT — PAIN - FUNCTIONAL ASSESSMENT
PAIN_FUNCTIONAL_ASSESSMENT: 0-10
PAIN_FUNCTIONAL_ASSESSMENT: NONE - DENIES PAIN

## 2024-04-05 ASSESSMENT — PAIN DESCRIPTION - DESCRIPTORS
DESCRIPTORS: CRAMPING
DESCRIPTORS: CRAMPING

## 2024-04-05 NOTE — ANESTHESIA PRE PROCEDURE
Department of Anesthesiology  Preprocedure Note       Name:  Leena Ye   Age:  58 y.o.  :  1965                                          MRN:  [id number]         Date:  2024      Surgeon: Surgeon(s):  Renetta Haider MD    Procedure: Procedure(s):  TRANSVAGINAL HYSTERECTOMY WITH ANTERIOR REPAIR    Medications prior to admission:   Prior to Admission medications    Medication Sig Start Date End Date Taking? Authorizing Provider   traZODone (DESYREL) 150 MG tablet Take one tab po nightly 24   Sebastian Mojica MD   gabapentin (NEURONTIN) 600 MG tablet Take 1 tablet by mouth daily as needed.    Provider, MD Julian       Current medications:    Current Facility-Administered Medications   Medication Dose Route Frequency Provider Last Rate Last Admin   • lactated ringers IV soln infusion   IntraVENous Continuous Neidig, Merlin Gaviria MD 10 mL/hr at 24 0614 New Bag at 24 0614       Allergies:    Allergies   Allergen Reactions   • Penicillin G Hives and Nausea And Vomiting       Problem List:    Patient Active Problem List   Diagnosis Code   • Displacement of lumbar intervertebral disc without myelopathy M51.26   • Spinal stenosis of lumbar region M48.061   • S/P lumbar fusion (PLIF) L5-S1 Z98.1   • Bilateral leg pain M79.604, M79.605   • DDD (degenerative disc disease), lumbar M51.36   • Chronic back pain M54.9, G89.29   • Chronic pain G89.29   • Postlaminectomy syndrome, lumbar M96.1   • Neural foraminal stenosis of lumbar spine M48.061   • S/P parathyroidectomy Z98.890, Z90.89   • Insomnia G47.00   • Stage 3 chronic kidney disease (HCC) N18.30   • Shoulder impingement, left M25.812   • Arthritis of right acromioclavicular joint M19.011   • Shoulder impingement, right M25.811   • CKD (chronic kidney disease) stage 4, GFR 15-29 ml/min ([facility]) N18.4   • Postprocedural hypoparathyroidism ([facility]) E89.2   • Traumatic complete tear of right rotator cuff S46.011A   • Impingement

## 2024-04-05 NOTE — OP NOTE
PATIENT NAME:   Leena Ye  DATE OF PROCEDURE:               4/5/2024  SURGEON:                                      RUI OGDEN M.D.   ASSISTANT:   PREOPERATIVE DIAGNOSIS:       A grade 2-3 uterine prolapse with severe cystocele   POSTOPERATIVE DIAGNOSIS:    Same.   OPERATION:                                  Transvaginal hysterectomy + anterior repair  ANESTHESIA:                                 General.   ESTIMATED BLOOD LOSS:          About 50 mL.   COMPLICATIONS:                          None.     DESCRIPTION OF PROCEDURE: With the patient in supine position, general anesthesia was administered without any complications. The patient was then placed in the dorsal lithotomy position using cane stirrups. The perineum was scrubbed and draped in the usual fashion. A Pratt catheter was inserted, and a clear-looking urine was recovered.A heavy weighted retractor was placed in the vagina. The anterior lip of the cervix was caught by a single tooth tenaculum, and the cervicovaginal intersection was injected with approximately 20 mL of 1% Xylocaine with epinephrine. A circumferential incision was done around the cervix and, starting posteriorly, the cul-de-sac was dissected, and we entered into the abdominal cavity without any complications. The posterior wall of the peritoneum was attached to the posterior vaginal cuff by a single suture of #1 vicryl for identification later on. The dissection was then carried anteriorly, and the bladder was dissected off the lower uterine segment, and the anterior cul-de-sac was entered without any problems, and the right angle Jazlyn retractor was placed there to move the bladder out of harm's way. The same thing then was done posteriorly, and the rectum was moved out of harm's way. Next, using the LigaSure Impact, the uterosacral ligaments were successfully clamped, coagulated and cut, and so was the cardinal ligament on both ends. The uterine vessels were then

## 2024-04-05 NOTE — H&P
Department of Gynecology  H&P Note          CHIEF COMPLAINT:   Uterine prolapse    History obtained from patient    HISTORY OF PRESENT ILLNESS:     The patient is a 58 y.o. female with grade 3 uterine prolapse and grade 2 cystocele affecting daily activities  Past Medical History:        Diagnosis Date    Chronic back pain     Chronic kidney disease     stage 3  follows with nephrology - stable per patient    Dog bite of thumb     right    Low back pain     Vaginal prolapse      Past Surgical History:        Procedure Laterality Date    CARPAL TUNNEL RELEASE Bilateral     CHOLECYSTECTOMY      HAND SURGERY Right 04/13/2022    RIGHT THUMB EXPLORATION WITH EXCISIONAL DEBRIDEMENT and NEUROLYSIS performed by Aditya Hopper MD at [facility] OR    LUMBAR FUSION  02/01/2013    PLIF  L5 - S1    NOSE SURGERY      four nose surgeries   ( d/t a fractures)    PAIN MANAGEMENT PROCEDURE N/A 02/01/2022    LUMBAR TRANSFORAMINAL EPIDURAL STEROID INJECTION LEFT L3 &RIGHT L4 UNDER XRAY GUIDANCE (77665) performed by Steffen Polk MD at [facility] OR    PAIN MANAGEMENT PROCEDURE N/A 08/02/2022    SPINAL CORD STIMULATOR TRIAL WITH [employer] performed by Steffen Polk MD at [facility] OR    PAIN MANAGEMENT PROCEDURE N/A 08/30/2022    SPINAL CORD STIMULATOR IMPLANT WITH B[employer] (CPT 63015) performed by Steffen Polk MD at [facility] OR    PARATHYROIDECTOMY Right 02/09/2018    ROTATOR CUFF REPAIR Right 2008    R rotator cuff repair    SHOULDER ARTHROSCOPY Right 02/21/2020    RIGHT SHOULDER ARTHROSCOPY. SUBACROMIAL DECOMPRESSION, LABRAL AND BICEP  DEBRIDEMENT, ROTATOR CUFF REPAIR performed by Everette Zimmer DO at [facility] OR    SHOULDER ARTHROSCOPY Left 11/12/2021    LEFT SHOULDER ARTHROSCOPY, SUBACROMIAL DECOMPRESSION,  ROTATOR CUFF REPAIR  (CPT 17068, 34765) (ARTHREX) performed by Everette Zimmer DO at [facility] OR    SPINAL CORD STIMULATOR IMPLANT      2023       Past Gynecological

## 2024-04-05 NOTE — ANESTHESIA POSTPROCEDURE EVALUATION
Department of Anesthesiology  Postprocedure Note    Patient: Leena Ye  MRN: [id number]  YOB: 1965  Date of evaluation: 4/5/2024    Procedure Summary       Date: 04/05/24 Room / Location: [address]    Anesthesia Start: 0753 Anesthesia Stop: 0937    Procedure: TRANSVAGINAL HYSTERECTOMY WITH ANTERIOR REPAIR Diagnosis:       Uterine prolapse      (Uterine prolapse [N81.4])    Surgeons: Renetta Haider MD Responsible Provider: Merlin Dnun MD    Anesthesia Type: General ASA Status: 3            Anesthesia Type: General    Omar Phase I: Omar Score: 9    Omar Phase II:      Anesthesia Post Evaluation    Patient location during evaluation: PACU  Patient participation: complete - patient participated  Level of consciousness: awake and alert  Pain score: 3  Airway patency: patent  Nausea & Vomiting: no nausea  Cardiovascular status: blood pressure returned to baseline  Respiratory status: acceptable  Hydration status: euvolemic    No notable events documented.

## 2024-04-05 NOTE — DISCHARGE INSTRUCTIONS
Pelvic Prolapse: What to Expect at Home  Your Recovery  You had pelvic prolapse surgery. This surgery put your organ back in place. It also adds support to your muscles.  You can expect to feel better and stronger each day. But you may get tired quickly and need pain medicine for a week or two. After a laparoscopy, you may have shoulder pain. This is caused by the air your doctor put in your belly to help see your organs better. The pain may last for a day or two. You may need about 4 to 6 weeks to fully recover from open surgery and 1 to 2 weeks to recover from laparoscopic surgery or vaginal surgery.  It is important to avoid heavy lifting while you are recovering, so that your incision can heal.  This care sheet gives you a general idea about how long it will take for you to recover. But each person recovers at a different pace. Follow the steps below to get better as quickly as possible.  How can you care for yourself at home?  Activity    Rest when you feel tired. Getting enough sleep will help you recover.     Try to walk each day. Start out by walking a little more than you did the day before. Bit by bit, increase the amount you walk. Walking boosts blood flow and helps prevent blood clots, pneumonia, and constipation.     Avoid lifting anything that will make you strain--which includes lifting a child, a vacuum, or grocery bags--for about 4 to 6 weeks after surgery.     Avoid strenuous activities, such as biking, jogging, weightlifting, and aerobic exercise, for about 4 to 6 weeks after surgery.     You may shower. Pat the incision dry when you are done. Do not take a bath for the first week after surgery or until your doctor tells you it is okay.     You may have some light vaginal bleeding. Wear sanitary pads if needed. Do not douche or use tampons.     Ask your doctor when you can drive again.     You will probably need to take 2 to 4 weeks off work for open surgery and 1 week off for laparoscopic      Bright red blood has soaked through the bandage over your incision.     You have vaginal discharge that has increased in amount or smells bad.     You have signs of infection, such as:  Increased pain, swelling, warmth, or redness.  Red streaks leading from the incision.  Pus draining from the incision.  A fever.     You cannot pass stools or gas.     You have signs of a blood clot in your leg (called deep vein thrombosis), such as:  Pain in your calf, back of knee, thigh, or groin.  Redness and swelling in your leg.     You have symptoms of a urinary tract infection. These may include:  Pain or burning when you urinate.  A frequent need to urinate without being able to pass much urine.  Pain in the flank, which is just below the rib cage and above the waist on either side of the back.  Blood in your urine.  A fever.   Watch closely for any changes in your health, and be sure to contact your doctor if you have any problems.  Where can you learn more?  Go to https://www.[employer]net/patientEd and enter J651 to learn more about \"Pelvic Prolapse: What to Expect at Home.\"  Current as of: November 27, 2023               Content Version: 14.0  © 6426-7370 [employer].   Care instructions adapted under license by [employer]. If you have questions about a medical condition or this instruction, always ask your healthcare professional. [employer] disclaims any warranty or liability for your use of this information.

## 2024-04-11 LAB — SURGICAL PATHOLOGY REPORT: NORMAL

## 2024-05-28 ASSESSMENT — PATIENT HEALTH QUESTIONNAIRE - PHQ9
1. LITTLE INTEREST OR PLEASURE IN DOING THINGS: NOT AT ALL
SUM OF ALL RESPONSES TO PHQ9 QUESTIONS 1 & 2: 0
2. FEELING DOWN, DEPRESSED OR HOPELESS: NOT AT ALL
SUM OF ALL RESPONSES TO PHQ9 QUESTIONS 1 & 2: 0
SUM OF ALL RESPONSES TO PHQ QUESTIONS 1-9: 0
2. FEELING DOWN, DEPRESSED OR HOPELESS: NOT AT ALL
1. LITTLE INTEREST OR PLEASURE IN DOING THINGS: NOT AT ALL

## 2024-05-30 ENCOUNTER — OFFICE VISIT (OUTPATIENT)
Dept: FAMILY MEDICINE CLINIC | Age: 59
End: 2024-05-30
Payer: COMMERCIAL

## 2024-05-30 VITALS
WEIGHT: 178 LBS | SYSTOLIC BLOOD PRESSURE: 122 MMHG | DIASTOLIC BLOOD PRESSURE: 78 MMHG | HEART RATE: 61 BPM | TEMPERATURE: 98 F | RESPIRATION RATE: 19 BRPM | BODY MASS INDEX: 34.95 KG/M2 | HEIGHT: 60 IN | OXYGEN SATURATION: 99 %

## 2024-05-30 DIAGNOSIS — M65.30 TRIGGER FINGER, UNSPECIFIED FINGER, UNSPECIFIED LATERALITY: ICD-10-CM

## 2024-05-30 DIAGNOSIS — M48.061 NEURAL FORAMINAL STENOSIS OF LUMBAR SPINE: Chronic | ICD-10-CM

## 2024-05-30 DIAGNOSIS — G89.4 CHRONIC PAIN SYNDROME: Chronic | ICD-10-CM

## 2024-05-30 DIAGNOSIS — G47.00 INSOMNIA, UNSPECIFIED TYPE: ICD-10-CM

## 2024-05-30 DIAGNOSIS — E89.2 POSTPROCEDURAL HYPOPARATHYROIDISM (HCC): ICD-10-CM

## 2024-05-30 DIAGNOSIS — Z96.89 S/P INSERTION OF SPINAL CORD STIMULATOR: ICD-10-CM

## 2024-05-30 DIAGNOSIS — Z12.11 SCREEN FOR COLON CANCER: ICD-10-CM

## 2024-05-30 DIAGNOSIS — N18.4 CKD (CHRONIC KIDNEY DISEASE) STAGE 4, GFR 15-29 ML/MIN (HCC): ICD-10-CM

## 2024-05-30 DIAGNOSIS — Z00.00 WELCOME TO MEDICARE PREVENTIVE VISIT: ICD-10-CM

## 2024-05-30 DIAGNOSIS — M79.673 HEEL PAIN, UNSPECIFIED LATERALITY: ICD-10-CM

## 2024-05-30 DIAGNOSIS — R73.9 HYPERGLYCEMIA: Primary | ICD-10-CM

## 2024-05-30 LAB — HBA1C MFR BLD: 5.6 %

## 2024-05-30 PROCEDURE — G8427 DOCREV CUR MEDS BY ELIG CLIN: HCPCS | Performed by: FAMILY MEDICINE

## 2024-05-30 PROCEDURE — 83036 HEMOGLOBIN GLYCOSYLATED A1C: CPT | Performed by: FAMILY MEDICINE

## 2024-05-30 PROCEDURE — 99214 OFFICE O/P EST MOD 30 MIN: CPT | Performed by: FAMILY MEDICINE

## 2024-05-30 PROCEDURE — G8417 CALC BMI ABV UP PARAM F/U: HCPCS | Performed by: FAMILY MEDICINE

## 2024-05-30 PROCEDURE — 1036F TOBACCO NON-USER: CPT | Performed by: FAMILY MEDICINE

## 2024-05-30 PROCEDURE — 3017F COLORECTAL CA SCREEN DOC REV: CPT | Performed by: FAMILY MEDICINE

## 2024-05-30 PROCEDURE — G0438 PPPS, INITIAL VISIT: HCPCS | Performed by: FAMILY MEDICINE

## 2024-05-30 RX ORDER — GABAPENTIN 600 MG/1
600 TABLET ORAL DAILY PRN
Qty: 90 TABLET | Refills: 1 | Status: SHIPPED | OUTPATIENT
Start: 2024-05-30 | End: 2027-02-23

## 2024-05-30 RX ORDER — TRAZODONE HYDROCHLORIDE 150 MG/1
TABLET ORAL
Qty: 90 TABLET | Refills: 1 | Status: SHIPPED | OUTPATIENT
Start: 2024-05-30

## 2024-05-30 SDOH — ECONOMIC STABILITY: FOOD INSECURITY: WITHIN THE PAST 12 MONTHS, THE FOOD YOU BOUGHT JUST DIDN'T LAST AND YOU DIDN'T HAVE MONEY TO GET MORE.: NEVER TRUE

## 2024-05-30 SDOH — ECONOMIC STABILITY: INCOME INSECURITY: IN THE LAST 12 MONTHS, WAS THERE A TIME WHEN YOU WERE NOT ABLE TO PAY THE MORTGAGE OR RENT ON TIME?: NO

## 2024-05-30 SDOH — ECONOMIC STABILITY: FOOD INSECURITY: WITHIN THE PAST 12 MONTHS, YOU WORRIED THAT YOUR FOOD WOULD RUN OUT BEFORE YOU GOT MONEY TO BUY MORE.: NEVER TRUE

## 2024-05-30 SDOH — ECONOMIC STABILITY: HOUSING INSECURITY: IN THE LAST 12 MONTHS, HOW MANY PLACES HAVE YOU LIVED?: 1

## 2024-05-30 SDOH — ECONOMIC STABILITY: INCOME INSECURITY: HOW HARD IS IT FOR YOU TO PAY FOR THE VERY BASICS LIKE FOOD, HOUSING, MEDICAL CARE, AND HEATING?: NOT HARD AT ALL

## 2024-05-30 ASSESSMENT — LIFESTYLE VARIABLES
HOW MANY STANDARD DRINKS CONTAINING ALCOHOL DO YOU HAVE ON A TYPICAL DAY: PATIENT DOES NOT DRINK
HOW OFTEN DO YOU HAVE A DRINK CONTAINING ALCOHOL: NEVER

## 2024-05-30 NOTE — PATIENT INSTRUCTIONS
of a heart attack. These may include:    Chest pain or pressure, or a strange feeling in the chest.     Sweating.     Shortness of breath.     Pain, pressure, or a strange feeling in the back, neck, jaw, or upper belly or in one or both shoulders or arms.     Lightheadedness or sudden weakness.     A fast or irregular heartbeat.   After you call 911, the  may tell you to chew 1 adult-strength or 2 to 4 low-dose aspirin. Wait for an ambulance. Do not try to drive yourself.  Watch closely for changes in your health, and be sure to contact your doctor if you have any problems.  Where can you learn more?  Go to https://www.[employer].net/patientEd and enter F075 to learn more about \"A Healthy Heart: Care Instructions.\"  Current as of: June 24, 2023               Content Version: 14.0  © 4467-1834 [employer].   Care instructions adapted under license by [employer]. If you have questions about a medical condition or this instruction, always ask your healthcare professional. [employer] disclaims any warranty or liability for your use of this information.      Personalized Preventive Plan for Leena Ye - 5/30/2024  Medicare offers a range of preventive health benefits. Some of the tests and screenings are paid in full while other may be subject to a deductible, co-insurance, and/or copay.    Some of these benefits include a comprehensive review of your medical history including lifestyle, illnesses that may run in your family, and various assessments and screenings as appropriate.    After reviewing your medical record and screening and assessments performed today your provider may have ordered immunizations, labs, imaging, and/or referrals for you.  A list of these orders (if applicable) as well as your Preventive Care list are included within your After Visit Summary for your review.    Other Preventive Recommendations:    A preventive eye exam performed by an eye specialist is

## 2024-05-30 NOTE — PROGRESS NOTES
FM Progress Note    Subjective:   CKD. Sees Nephro. Utd on labs. Nl urination.     Heel pain. ? Spur. Used to see Dr. Patricia.     Trigger finger.     Insomnia. Chronic. Trazodone. Requesting increase. Counseling provided. Worse lately because of leg pains from back. Gabapentin used to help with this.     Chronic LBP. S/p spinal stimulator, helps with leg pains. Used to see Dr. Moreno.       Health Maintenance Due   Topic Date Due    Hepatitis B vaccine (1 of 3 - 3-dose series) Never done    Pneumococcal 0-64 years Vaccine (1 of 2 - PCV) Never done    Shingles vaccine (1 of 2) Never done    Colorectal Cancer Screen  08/26/2023    COVID-19 Vaccine (3 - 2023-24 season) 09/01/2023         Objective:   /78   Pulse 61   Temp 98 °F (36.7 °C)   Resp 19   Ht 1.524 m (5')   Wt 80.7 kg (178 lb)   LMP 04/15/2015   SpO2 99%   BMI 34.76 kg/m²   General appearance: NAD, alert and interacting appropriately  HEENT: NCAT, PERRLA, EOMI   Resp: CTAB, no WRC  CVS: RRR, no MRG  Abdomen: BS +, SNDNT  Extremities: No clubbing, cyanosis, or edema. Warm. Dry.        I have reviewed this patient's previous records.    I have reviewed this patient's labs.    I have reviewed this patient's medications.      Assessment/Plan:    Leena was seen today for medicare awv and pain.    Diagnoses and all orders for this visit:    Hyperglycemia  -     POCT glycosylated hemoglobin (Hb A1C)    Trigger finger, unspecified finger, unspecified laterality  -     Jhonatan Muniz MD, Sports Medicine, [facility]    Postprocedural hypoparathyroidism (HCC)    CKD (chronic kidney disease) stage 4, GFR 15-29 ml/min (HCC)    Screen for colon cancer  -     Cologuard (Fecal DNA Colorectal Cancer Screening)    Insomnia, unspecified type  -     Melita Cao DO, Sleep Medicine, [place]  -     traZODone (DESYREL) 150 MG tablet; Take one tab po nightly  -     gabapentin (NEURONTIN) 600 MG tablet; Take 1 tablet by mouth daily

## 2024-05-30 NOTE — PROGRESS NOTES
Medicare Annual Wellness Visit    Leena Ye is here for Medicare AWV and Pain (Patient has pain in LT leg and heel spurs on rt and Rt hand pain )    Assessment & Plan   Hyperglycemia  -     POCT glycosylated hemoglobin (Hb A1C)    Recommendations for Preventive Services Due: see orders and patient instructions/AVS.  Recommended screening schedule for the next 5-10 years is provided to the patient in written form: see Patient Instructions/AVS.     No follow-ups on file.     Subjective       Patient's complete Health Risk Assessment and screening values have been reviewed and are found in Flowsheets. The following problems were reviewed today and where indicated follow up appointments were made and/or referrals ordered.    Positive Risk Factor Screenings with Interventions:                Activity, Diet, and Weight:  On average, how many days per week do you engage in moderate to strenuous exercise (like a brisk walk)?: 3 days  On average, how many minutes do you engage in exercise at this level?: 80 min    Do you eat balanced/healthy meals regularly?: Yes    Body mass index is 34.76 kg/m². (!) Abnormal      Obesity Interventions:  Increase activity as tolerated             Advanced Directives:  Do you have a Living Will?: (!) No    Intervention:  has NO advanced directive - information provided                 Objective   Vitals:    05/30/24 0942   BP: 122/78   Pulse: 61   Resp: 19   Temp: 98 °F (36.7 °C)   SpO2: 99%   Weight: 80.7 kg (178 lb)   Height: 1.524 m (5')      Body mass index is 34.76 kg/m².             Allergies   Allergen Reactions    Penicillin G Hives and Nausea And Vomiting     Prior to Visit Medications    Medication Sig Taking? Authorizing Provider   traZODone (DESYREL) 150 MG tablet Take one tab po nightly Yes Sebastian Mojica MD   gabapentin (NEURONTIN) 600 MG tablet Take 1 tablet by mouth daily as needed. Yes ProviderJulian MD CareTeam (Including outside

## 2024-06-11 ENCOUNTER — OFFICE VISIT (OUTPATIENT)
Dept: ORTHOPEDIC SURGERY | Age: 59
End: 2024-06-11
Payer: COMMERCIAL

## 2024-06-11 VITALS — HEIGHT: 60 IN | WEIGHT: 178 LBS | BODY MASS INDEX: 34.95 KG/M2

## 2024-06-11 DIAGNOSIS — M79.641 RIGHT HAND PAIN: Primary | ICD-10-CM

## 2024-06-11 DIAGNOSIS — M65.331 TRIGGER MIDDLE FINGER OF RIGHT HAND: ICD-10-CM

## 2024-06-11 DIAGNOSIS — M65.321 TRIGGER INDEX FINGER OF RIGHT HAND: ICD-10-CM

## 2024-06-11 PROCEDURE — 20550 NJX 1 TENDON SHEATH/LIGAMENT: CPT | Performed by: FAMILY MEDICINE

## 2024-06-11 PROCEDURE — G8427 DOCREV CUR MEDS BY ELIG CLIN: HCPCS | Performed by: FAMILY MEDICINE

## 2024-06-11 PROCEDURE — 99203 OFFICE O/P NEW LOW 30 MIN: CPT | Performed by: FAMILY MEDICINE

## 2024-06-11 PROCEDURE — 3017F COLORECTAL CA SCREEN DOC REV: CPT | Performed by: FAMILY MEDICINE

## 2024-06-11 PROCEDURE — 1036F TOBACCO NON-USER: CPT | Performed by: FAMILY MEDICINE

## 2024-06-11 PROCEDURE — 76942 ECHO GUIDE FOR BIOPSY: CPT | Performed by: FAMILY MEDICINE

## 2024-06-11 PROCEDURE — G8417 CALC BMI ABV UP PARAM F/U: HCPCS | Performed by: FAMILY MEDICINE

## 2024-06-11 RX ORDER — LIDOCAINE HYDROCHLORIDE 10 MG/ML
2 INJECTION, SOLUTION INFILTRATION; PERINEURAL ONCE
Status: COMPLETED | OUTPATIENT
Start: 2024-06-11 | End: 2024-06-11

## 2024-06-11 RX ORDER — BETAMETHASONE SODIUM PHOSPHATE AND BETAMETHASONE ACETATE 3; 3 MG/ML; MG/ML
12 INJECTION, SUSPENSION INTRA-ARTICULAR; INTRALESIONAL; INTRAMUSCULAR; SOFT TISSUE ONCE
Status: COMPLETED | OUTPATIENT
Start: 2024-06-11 | End: 2024-06-11

## 2024-06-11 RX ADMIN — LIDOCAINE HYDROCHLORIDE 2 ML: 10 INJECTION, SOLUTION INFILTRATION; PERINEURAL at 11:36

## 2024-06-11 RX ADMIN — BETAMETHASONE SODIUM PHOSPHATE AND BETAMETHASONE ACETATE 12 MG: 3; 3 INJECTION, SUSPENSION INTRA-ARTICULAR; INTRALESIONAL; INTRAMUSCULAR; SOFT TISSUE at 11:35

## 2024-06-11 NOTE — PROGRESS NOTES
PROCEDURE NOTE:    DIAGNOSIS    RIGHT trigger finger, INDEX finger  RIGHT trigger finger, MIDDLE finger    PROCEDURE    Ultrasound-guided RIGHT INDEX finger flexor tendon sheath steroid injection at the A1 pulley.  Ultrasound-guided RIGHT MIDDLE finger flexor tendon sheath steroid injection at the A1 pulley.    PROCEDURAL PAUSE    Procedural pause conducted to verify: correct patient identity, procedure to be performed, and as applicable, correct side and site, correct patient position, and availability of implants, special equipment, or special requirements.    PROCEDURE DETAILS    The procedure was carried out under sterile technique.    Patient Position: Seated    Localization Process: The RIGHT MIDDLE finger A1 pulley was evaluated under ultrasound prior to starting the procedure. The skin was prepped with Betadine and Alcohol.    Approach: In-plane.    Injection: A 25-gauge 2-inch needle was advanced from an in-plane, distal to proximal approach into the flexor tendon sheath at the a1 pulley. After visualization of the needle tip in the target area and negative aspiration for blood, a mixture of 0.5 cc of 1% lidocaine and 0.5 cc of betamethasone (6 mg/cc) was injected into the tendon sheath at the a1 pulley with excellent sonographic flow. Images of procedure were permanently recorded.    Localization Process: The RIGHT INDEX finger A1 pulley was evaluated under ultrasound prior to starting the procedure. The skin was prepped with Betadine and Alcohol.    Approach: In-plane.    Injection: A 25-gauge 2-inch needle was advanced from an in-plane, distal to proximal approach into the flexor tendon sheath at the a1 pulley. After visualization of the needle tip in the target area and negative aspiration for blood, a mixture of 0.5 cc of 1% lidocaine and 0.5 cc of betamethasone (6 mg/cc) was injected into the tendon sheath at the a1 pulley with excellent sonographic flow. Images of procedure were permanently

## 2024-06-11 NOTE — PROGRESS NOTES
[facility]  PRIMARY CARE SPORTS MEDICINE  DATE OF VISIT : 2024    Patient : Leena Ye  Age : 58 y.o.   : 1965  MRN : [id number]   ______________________________________________________________________    Chief Complaint :   Chief Complaint   Patient presents with    Finger Pain     Right index and middle finger TF CSI. Patient states that she had Sx after a dog bite, also has previous Hx of carpal tunnel and TF release on the thumb.       HPI : Leena Ye is 58 y.o. female who presented to the clinic today for evaluation of right hand pain. Patient complaints of right index and right middle finger catching. This has been present several months however recently has become progressively more aggravating. This affects most every activity involving gripping. Originally only a catching sensation was present, however now locking and pain are present. Treatment to date: Activity modification and OTC medications without significant relief.      Past Medical History :  Past Medical History:   Diagnosis Date    Chronic back pain     Chronic kidney disease     stage 3  follows with nephrology - stable per patient    Dog bite of thumb     right    Low back pain     Vaginal prolapse      Past Surgical History:   Procedure Laterality Date    CARPAL TUNNEL RELEASE Bilateral     CHOLECYSTECTOMY      HAND SURGERY Right 2022    RIGHT THUMB EXPLORATION WITH EXCISIONAL DEBRIDEMENT and NEUROLYSIS performed by Aditya Hopper MD at [facility] OR    HYSTERECTOMY (CERVIX STATUS UNKNOWN) N/A 2024    TRANSVAGINAL HYSTERECTOMY WITH ANTERIOR REPAIR performed by Renetta Haider MD at [facility] OR    LUMBAR FUSION  2013    PLIF  L5 - S1    NOSE SURGERY      four nose surgeries   ( d/t a fractures)    PAIN MANAGEMENT PROCEDURE N/A 2022    LUMBAR TRANSFORAMINAL EPIDURAL STEROID INJECTION LEFT L3 &RIGHT L4 UNDER XRAY GUIDANCE (96534) performed by Steffen Polk MD at [facility] OR    PAIN

## 2024-07-12 ENCOUNTER — HOSPITAL ENCOUNTER (OUTPATIENT)
Age: 59
Discharge: HOME | End: 2024-07-12
Payer: COMMERCIAL

## 2024-07-12 LAB
25(OH)D3 SERPL-MCNC: 48 NG/ML (ref 30–100)
ALBUMIN SERPL-MCNC: 4.3 G/DL (ref 3.5–5.2)
ALP SERPL-CCNC: 80 U/L (ref 35–104)
ALT SERPL-CCNC: 14 U/L (ref 0–32)
ANION GAP SERPL CALCULATED.3IONS-SCNC: 10 MMOL/L (ref 7–16)
AST SERPL-CCNC: 18 U/L (ref 0–31)
BILIRUB SERPL-MCNC: 0.4 MG/DL (ref 0–1.2)
BUN SERPL-MCNC: 17 MG/DL (ref 6–20)
CALCIUM SERPL-MCNC: 9.4 MG/DL (ref 8.6–10.2)
CHLORIDE SERPL-SCNC: 105 MMOL/L (ref 98–107)
CO2 SERPL-SCNC: 26 MMOL/L (ref 22–29)
CREAT SERPL-MCNC: 1.4 MG/DL (ref 0.5–1)
ERYTHROCYTE [DISTWIDTH] IN BLOOD BY AUTOMATED COUNT: 12.2 % (ref 11.5–15)
GFR, ESTIMATED: 44 ML/MIN/1.73M2
GLUCOSE SERPL-MCNC: 116 MG/DL (ref 74–99)
HCT VFR BLD AUTO: 40 % (ref 34–48)
HGB BLD-MCNC: 13.1 G/DL (ref 11.5–15.5)
MCH RBC QN AUTO: 26.8 PG (ref 26–35)
MCHC RBC AUTO-ENTMCNC: 32.8 G/DL (ref 32–34.5)
MCV RBC AUTO: 82 FL (ref 80–99.9)
PHOSPHATE SERPL-MCNC: 3.3 MG/DL (ref 2.5–4.5)
PLATELET # BLD AUTO: 183 K/UL (ref 130–450)
PMV BLD AUTO: 10.7 FL (ref 7–12)
POTASSIUM SERPL-SCNC: 4.2 MMOL/L (ref 3.5–5)
PROT SERPL-MCNC: 7.1 G/DL (ref 6.4–8.3)
PTH-INTACT SERPL-MCNC: 53.1 PG/ML (ref 15–65)
RBC # BLD AUTO: 4.88 M/UL (ref 3.5–5.5)
SODIUM SERPL-SCNC: 141 MMOL/L (ref 132–146)
WBC OTHER # BLD: 6.3 K/UL (ref 4.5–11.5)

## 2024-07-12 PROCEDURE — 85027 COMPLETE CBC AUTOMATED: CPT

## 2024-07-12 PROCEDURE — 84100 ASSAY OF PHOSPHORUS: CPT

## 2024-07-12 PROCEDURE — 82306 VITAMIN D 25 HYDROXY: CPT

## 2024-07-12 PROCEDURE — 80053 COMPREHEN METABOLIC PANEL: CPT

## 2024-07-12 PROCEDURE — 83970 ASSAY OF PARATHORMONE: CPT

## 2024-07-12 PROCEDURE — 36415 COLL VENOUS BLD VENIPUNCTURE: CPT

## 2024-08-02 ENCOUNTER — OFFICE VISIT (OUTPATIENT)
Dept: PAIN MANAGEMENT | Age: 59
End: 2024-08-02
Payer: COMMERCIAL

## 2024-08-02 VITALS
HEART RATE: 60 BPM | RESPIRATION RATE: 18 BRPM | OXYGEN SATURATION: 97 % | HEIGHT: 60 IN | WEIGHT: 178 LBS | SYSTOLIC BLOOD PRESSURE: 130 MMHG | DIASTOLIC BLOOD PRESSURE: 82 MMHG | BODY MASS INDEX: 34.95 KG/M2 | TEMPERATURE: 96.1 F

## 2024-08-02 DIAGNOSIS — Z96.89 S/P INSERTION OF SPINAL CORD STIMULATOR: ICD-10-CM

## 2024-08-02 DIAGNOSIS — M96.1 POSTLAMINECTOMY SYNDROME, LUMBAR: Primary | ICD-10-CM

## 2024-08-02 DIAGNOSIS — M47.817 LUMBOSACRAL SPONDYLOSIS WITHOUT MYELOPATHY: ICD-10-CM

## 2024-08-02 DIAGNOSIS — M51.36 DDD (DEGENERATIVE DISC DISEASE), LUMBAR: ICD-10-CM

## 2024-08-02 DIAGNOSIS — M54.16 LUMBAR RADICULAR PAIN: ICD-10-CM

## 2024-08-02 PROCEDURE — 3017F COLORECTAL CA SCREEN DOC REV: CPT | Performed by: ANESTHESIOLOGY

## 2024-08-02 PROCEDURE — G8417 CALC BMI ABV UP PARAM F/U: HCPCS | Performed by: ANESTHESIOLOGY

## 2024-08-02 PROCEDURE — 1036F TOBACCO NON-USER: CPT | Performed by: ANESTHESIOLOGY

## 2024-08-02 PROCEDURE — 99213 OFFICE O/P EST LOW 20 MIN: CPT | Performed by: ANESTHESIOLOGY

## 2024-08-02 PROCEDURE — G8427 DOCREV CUR MEDS BY ELIG CLIN: HCPCS | Performed by: ANESTHESIOLOGY

## 2024-08-02 NOTE — PROGRESS NOTES
[facility]  [address], [place] [address]  [phone]    Follow up Note      Leena M Ephraim     Date of Visit:  8/2/2024    CC:  Patient presents for follow up   Chief Complaint   Patient presents with    Back Pain     Has SCS     HPI:  H/o Chronic low back pain.     S/P SCS lead and IPG implantation on 8/30/2022.    SCS is helping her very well. Significant improvement in pain and functionality.    Nursing notes and details of the pain history reviewed. Please see intake notes for details.    Prior treatment:   PT  Meds  Surgery  SCS    Past Medical History:   Diagnosis Date    Chronic back pain     Chronic kidney disease     stage 3  follows with nephrology - stable per patient    Dog bite of thumb     right    Low back pain     Vaginal prolapse        Past Surgical History:   Procedure Laterality Date    CARPAL TUNNEL RELEASE Bilateral     CHOLECYSTECTOMY      HAND SURGERY Right 04/13/2022    RIGHT THUMB EXPLORATION WITH EXCISIONAL DEBRIDEMENT and NEUROLYSIS performed by Aditya Hopper MD at [facility] OR    HYSTERECTOMY (CERVIX STATUS UNKNOWN) N/A 4/5/2024    TRANSVAGINAL HYSTERECTOMY WITH ANTERIOR REPAIR performed by Renetta Haider MD at [facility] OR    LUMBAR FUSION  02/01/2013    PLIF  L5 - S1    NOSE SURGERY      four nose surgeries   ( d/t a fractures)    PAIN MANAGEMENT PROCEDURE N/A 02/01/2022    LUMBAR TRANSFORAMINAL EPIDURAL STEROID INJECTION LEFT L3 &RIGHT L4 UNDER XRAY GUIDANCE (88377) performed by Steffen Polk MD at [facility] OR    PAIN MANAGEMENT PROCEDURE N/A 08/02/2022    SPINAL CORD STIMULATOR TRIAL WITH [employer]ENT[employer] performed by Steffen Polk MD at [facility] OR    PAIN MANAGEMENT PROCEDURE N/A 08/30/2022    SPINAL CORD STIMULATOR IMPLANT WITH B[employer] SCIENTIFIC (CPT 85385) performed by Steffen Polk MD at [facility] OR    PARATHYROIDECTOMY Right 02/09/2018    ROTATOR CUFF REPAIR Right 2008    R rotator cuff repair

## 2024-08-02 NOTE — PROGRESS NOTES
Leena Ye presents to the [facility] on 8/2/2024. Leena is complaining of pain lower back, radiates to BLE. The pain is constant. The pain is described as aching and stabbing. Pain is rated on her best day at a 4, on her worst day at a 7, and on average at a 4 on the VAS scale. She took her last dose of Neurontin, Motrin, and Aleve  Aleve is PRN.      Any procedures since your last visit: No  She is  on NSAIDS and  is not on anticoagulation medications to include none   Pacemaker or defibrillator: No   Medication Contract and Consent for Opioid Use Documents Filed        No documents found                       LMP 04/15/2015      Patient's last menstrual period was 04/15/2015.

## 2024-08-28 ENCOUNTER — OFFICE VISIT (OUTPATIENT)
Dept: PAIN MANAGEMENT | Age: 59
End: 2024-08-28
Payer: COMMERCIAL

## 2024-08-28 VITALS
RESPIRATION RATE: 18 BRPM | HEART RATE: 60 BPM | WEIGHT: 178 LBS | DIASTOLIC BLOOD PRESSURE: 70 MMHG | HEIGHT: 60 IN | OXYGEN SATURATION: 98 % | SYSTOLIC BLOOD PRESSURE: 110 MMHG | BODY MASS INDEX: 34.95 KG/M2 | TEMPERATURE: 97.2 F

## 2024-08-28 DIAGNOSIS — M47.817 LUMBOSACRAL SPONDYLOSIS WITHOUT MYELOPATHY: ICD-10-CM

## 2024-08-28 DIAGNOSIS — Z96.89 S/P INSERTION OF SPINAL CORD STIMULATOR: ICD-10-CM

## 2024-08-28 DIAGNOSIS — M51.36 DDD (DEGENERATIVE DISC DISEASE), LUMBAR: ICD-10-CM

## 2024-08-28 DIAGNOSIS — M54.16 LUMBAR RADICULAR PAIN: ICD-10-CM

## 2024-08-28 DIAGNOSIS — M96.1 POSTLAMINECTOMY SYNDROME, LUMBAR: Primary | ICD-10-CM

## 2024-08-28 PROCEDURE — 1036F TOBACCO NON-USER: CPT | Performed by: ANESTHESIOLOGY

## 2024-08-28 PROCEDURE — 99213 OFFICE O/P EST LOW 20 MIN: CPT | Performed by: ANESTHESIOLOGY

## 2024-08-28 PROCEDURE — 3017F COLORECTAL CA SCREEN DOC REV: CPT | Performed by: ANESTHESIOLOGY

## 2024-08-28 PROCEDURE — G8417 CALC BMI ABV UP PARAM F/U: HCPCS | Performed by: ANESTHESIOLOGY

## 2024-08-28 PROCEDURE — G8427 DOCREV CUR MEDS BY ELIG CLIN: HCPCS | Performed by: ANESTHESIOLOGY

## 2024-08-28 PROCEDURE — 95972 ALYS CPLX SP/PN NPGT W/PRGRM: CPT | Performed by: ANESTHESIOLOGY

## 2024-08-28 NOTE — PROGRESS NOTES
[facility]  [address], [place] [address]  [phone]    Follow up Note      Leena HATCH Ephraim     Date of Visit:  8/28/2024    CC:  Patient presents for follow up   Chief Complaint   Patient presents with    Back Pain     HPI:  H/o Chronic low back pain.     S/P SCS lead and IPG implantation on 8/30/2022.    SCS is helping her very well. Significant improvement in pain and functionality.    Nursing notes and details of the pain history reviewed. Please see intake notes for details.    Prior treatment:   PT  Meds  Surgery  SCS    Past Medical History:   Diagnosis Date    Chronic back pain     Chronic kidney disease     stage 3  follows with nephrology - stable per patient    Dog bite of thumb     right    Low back pain     Vaginal prolapse        Past Surgical History:   Procedure Laterality Date    CARPAL TUNNEL RELEASE Bilateral     CHOLECYSTECTOMY      HAND SURGERY Right 04/13/2022    RIGHT THUMB EXPLORATION WITH EXCISIONAL DEBRIDEMENT and NEUROLYSIS performed by Aditya Hopper MD at [facility] OR    HYSTERECTOMY (CERVIX STATUS UNKNOWN) N/A 4/5/2024    TRANSVAGINAL HYSTERECTOMY WITH ANTERIOR REPAIR performed by Renetta Haider MD at [facility] OR    LUMBAR FUSION  02/01/2013    PLIF  L5 - S1    NOSE SURGERY      four nose surgeries   ( d/t a fractures)    PAIN MANAGEMENT PROCEDURE N/A 02/01/2022    LUMBAR TRANSFORAMINAL EPIDURAL STEROID INJECTION LEFT L3 &RIGHT L4 UNDER XRAY GUIDANCE (51883) performed by Steffen Polk MD at [facility] OR    PAIN MANAGEMENT PROCEDURE N/A 08/02/2022    SPINAL CORD STIMULATOR TRIAL WITH BOSTON SCIENTIFIC performed by Steffen Polk MD at [facility] OR    PAIN MANAGEMENT PROCEDURE N/A 08/30/2022    SPINAL CORD STIMULATOR IMPLANT WITH BOSTON SCIENTIFIC (CPT 05723) performed by Steffen Polk MD at [facility] OR    PARATHYROIDECTOMY Right 02/09/2018    ROTATOR CUFF REPAIR Right 2008    R rotator cuff repair    SHOULDER

## 2024-08-28 NOTE — PROGRESS NOTES
Leena Ye presents to the [facility] on 8/28/2024. Leena is complaining of pain in her lower back that radiates to BLE. The pain is constant. The pain is described as aching and stabbing. Pain is rated on her best day at a 4, on her worst day at a 7, and on average at a 4 on the VAS scale. She took her last dose of Neurontin, Motrin, and Aleve  yesterday.      Any procedures since your last visit: No    She is  on NSAIDS and  is not on anticoagulation medications to include none and is managed by NA.     Pacemaker or defibrillator: No     Medication Contract and Consent for Opioid Use Documents Filed        No documents found                       Resp 18   Ht 1.524 m (5')   Wt 80.7 kg (178 lb)   LMP 04/15/2015   BMI 34.76 kg/m²      Patient's last menstrual period was 04/15/2015.

## 2024-11-22 DIAGNOSIS — G89.4 CHRONIC PAIN SYNDROME: Chronic | ICD-10-CM

## 2024-11-22 DIAGNOSIS — Z96.89 S/P INSERTION OF SPINAL CORD STIMULATOR: ICD-10-CM

## 2024-11-22 DIAGNOSIS — G47.00 INSOMNIA, UNSPECIFIED TYPE: ICD-10-CM

## 2024-11-22 DIAGNOSIS — M48.061 NEURAL FORAMINAL STENOSIS OF LUMBAR SPINE: Chronic | ICD-10-CM

## 2024-11-22 RX ORDER — TRAZODONE HYDROCHLORIDE 150 MG/1
TABLET ORAL
Qty: 90 TABLET | Refills: 0 | Status: SHIPPED | OUTPATIENT
Start: 2024-11-22

## 2024-11-22 RX ORDER — GABAPENTIN 600 MG/1
TABLET ORAL
Qty: 90 TABLET | Refills: 0 | Status: SHIPPED | OUTPATIENT
Start: 2024-11-22 | End: 2027-08-18

## 2025-02-19 DIAGNOSIS — Z96.89 S/P INSERTION OF SPINAL CORD STIMULATOR: ICD-10-CM

## 2025-02-19 DIAGNOSIS — G89.4 CHRONIC PAIN SYNDROME: Chronic | ICD-10-CM

## 2025-02-19 DIAGNOSIS — M48.061 NEURAL FORAMINAL STENOSIS OF LUMBAR SPINE: Chronic | ICD-10-CM

## 2025-02-19 DIAGNOSIS — G47.00 INSOMNIA, UNSPECIFIED TYPE: ICD-10-CM

## 2025-02-19 RX ORDER — GABAPENTIN 600 MG/1
TABLET ORAL
Qty: 90 TABLET | Refills: 0 | Status: SHIPPED | OUTPATIENT
Start: 2025-02-19 | End: 2027-11-15

## 2025-02-19 RX ORDER — TRAZODONE HYDROCHLORIDE 150 MG/1
TABLET ORAL
Qty: 90 TABLET | Refills: 0 | Status: SHIPPED | OUTPATIENT
Start: 2025-02-19

## 2025-02-19 NOTE — TELEPHONE ENCOUNTER
Name of Medication(s) Requested:  Requested Prescriptions     Pending Prescriptions Disp Refills    gabapentin (NEURONTIN) 600 MG tablet [Pharmacy Med Name: Gabapentin Oral Tablet 600 MG] 90 tablet 0     Sig: TAKE ONE TABLET BY MOUTH DAILY AS NEEDED (LEG PAIN)    traZODone (DESYREL) 150 MG tablet [Pharmacy Med Name: traZODone HCl Oral Tablet 150 MG] 90 tablet 0     Sig: TAEK 1 TABLET BY MOUTH NIGHTLY       Medication is on current medication list Yes    Dosage and directions were verified? Yes    Quantity verified: 90 day supply     Pharmacy Verified?  Yes    Last Appointment:  5/30/2024    Future appts:  Future Appointments   Date Time Provider Department Center   2/28/2025 11:45 AM Steffen Polk MD [facility]   6/3/2025 10:15 AM Sebastian Mojica MD [facility]        (If no appt send self scheduling link. .REFILLAPPT)  Scheduling request sent?     [] Yes  [x] No    Does patient need updated?  [] Yes  [] No

## 2025-02-28 ENCOUNTER — OFFICE VISIT (OUTPATIENT)
Dept: PAIN MANAGEMENT | Age: 60
End: 2025-02-28
Payer: COMMERCIAL

## 2025-02-28 VITALS
DIASTOLIC BLOOD PRESSURE: 90 MMHG | RESPIRATION RATE: 18 BRPM | WEIGHT: 178 LBS | BODY MASS INDEX: 34.95 KG/M2 | HEIGHT: 60 IN | SYSTOLIC BLOOD PRESSURE: 130 MMHG | HEART RATE: 62 BPM | OXYGEN SATURATION: 96 % | TEMPERATURE: 96.9 F

## 2025-02-28 DIAGNOSIS — Z96.89 S/P INSERTION OF SPINAL CORD STIMULATOR: ICD-10-CM

## 2025-02-28 DIAGNOSIS — M54.16 LUMBAR RADICULAR PAIN: ICD-10-CM

## 2025-02-28 DIAGNOSIS — M51.369 DEGENERATION OF INTERVERTEBRAL DISC OF LUMBAR REGION, UNSPECIFIED WHETHER PAIN PRESENT: ICD-10-CM

## 2025-02-28 DIAGNOSIS — M47.817 LUMBOSACRAL SPONDYLOSIS WITHOUT MYELOPATHY: ICD-10-CM

## 2025-02-28 DIAGNOSIS — M96.1 POSTLAMINECTOMY SYNDROME, LUMBAR: Primary | ICD-10-CM

## 2025-02-28 PROCEDURE — 95971 ALYS SMPL SP/PN NPGT W/PRGRM: CPT | Performed by: ANESTHESIOLOGY

## 2025-02-28 PROCEDURE — 99213 OFFICE O/P EST LOW 20 MIN: CPT | Performed by: ANESTHESIOLOGY

## 2025-02-28 NOTE — PROGRESS NOTES
[facility]  [address], [place] [address]  [phone]    Follow up Note      Leena HATCH Ephraim     Date of Visit:  2/28/2025    CC:  Patient presents for follow up   Chief Complaint   Patient presents with    Back Pain     HPI:  H/o Chronic low back pain.     S/P SCS lead and IPG implantation on 8/30/2022.    SCS is helping her very well. Significant improvement in pain and functionality.    Nursing notes and details of the pain history reviewed. Please see intake notes for details.    Prior treatment:   PT  Meds  Surgery  SCS    Past Medical History:   Diagnosis Date    Chronic back pain     Chronic kidney disease     stage 3  follows with nephrology - stable per patient    Dog bite of thumb     right    Low back pain     Vaginal prolapse        Past Surgical History:   Procedure Laterality Date    CARPAL TUNNEL RELEASE Bilateral     CHOLECYSTECTOMY      HAND SURGERY Right 04/13/2022    RIGHT THUMB EXPLORATION WITH EXCISIONAL DEBRIDEMENT and NEUROLYSIS performed by Aditya Hopper MD at [facility] OR    HYSTERECTOMY (CERVIX STATUS UNKNOWN) N/A 4/5/2024    TRANSVAGINAL HYSTERECTOMY WITH ANTERIOR REPAIR performed by Renetta Haider MD at [facility] OR    LUMBAR FUSION  02/01/2013    PLIF  L5 - S1    NOSE SURGERY      four nose surgeries   ( d/t a fractures)    PAIN MANAGEMENT PROCEDURE N/A 02/01/2022    LUMBAR TRANSFORAMINAL EPIDURAL STEROID INJECTION LEFT L3 &RIGHT L4 UNDER XRAY GUIDANCE (57118) performed by Steffen Polk MD at [facility] OR    PAIN MANAGEMENT PROCEDURE N/A 08/02/2022    SPINAL CORD STIMULATOR TRIAL WITH [employer] performed by Steffen Polk MD at [facility] OR    PAIN MANAGEMENT PROCEDURE N/A 08/30/2022    SPINAL CORD STIMULATOR IMPLANT WITH [employer] (CPT 83740) performed by Steffen Polk MD at [facility] OR    PARATHYROIDECTOMY Right 02/09/2018    ROTATOR CUFF REPAIR Right 2008    R rotator cuff repair    SHOULDER

## 2025-02-28 NOTE — PROGRESS NOTES
Leena Ye presents to the [facility] on 2/28/2025. Leena is complaining of pain in her lower back that radiates to BLE. The pain is constant. The pain is described as aching and stabbing. Pain is rated on her best day at a 5, on her worst day at a 7, and on average at a 5 on the VAS scale. She took her last dose of Neurontin, Motrin, and Aleve  yesterday.      Any procedures since your last visit: No    She is  on NSAIDS and  is not on anticoagulation medications to include none and is managed by NA.     Pacemaker or defibrillator: No     Medication Contract and Consent for Opioid Use Documents Filed        No documents found                       Resp 18   Ht 1.524 m (5')   Wt 80.7 kg (178 lb)   LMP 04/15/2015   BMI 34.76 kg/m²      Patient's last menstrual period was 04/15/2015.

## 2025-03-26 ENCOUNTER — HOSPITAL ENCOUNTER (OUTPATIENT)
Age: 60
Discharge: HOME | End: 2025-03-26
Payer: COMMERCIAL

## 2025-03-26 LAB
25(OH)D3 SERPL-MCNC: 46.6 NG/ML (ref 30–100)
ALBUMIN SERPL-MCNC: 4.5 G/DL (ref 3.5–5.2)
ALP SERPL-CCNC: 82 U/L (ref 35–104)
ALT SERPL-CCNC: 13 U/L (ref 0–32)
ANION GAP SERPL CALCULATED.3IONS-SCNC: 11 MMOL/L (ref 7–16)
AST SERPL-CCNC: 17 U/L (ref 0–31)
BILIRUB SERPL-MCNC: 0.7 MG/DL (ref 0–1.2)
BILIRUB UR QL STRIP: NEGATIVE
BUN SERPL-MCNC: 20 MG/DL (ref 6–20)
CALCIUM SERPL-MCNC: 9.6 MG/DL (ref 8.6–10.2)
CHLORIDE SERPL-SCNC: 107 MMOL/L (ref 98–107)
CLARITY UR: CLEAR
CO2 SERPL-SCNC: 25 MMOL/L (ref 22–29)
COLOR UR: YELLOW
CREAT SERPL-MCNC: 1.3 MG/DL (ref 0.5–1)
CREAT UR-MCNC: 136 MG/DL (ref 29–226)
ERYTHROCYTE [DISTWIDTH] IN BLOOD BY AUTOMATED COUNT: 12.4 % (ref 11.5–15)
GFR, ESTIMATED: 46 ML/MIN/1.73M2
GLUCOSE SERPL-MCNC: 126 MG/DL (ref 74–99)
GLUCOSE UR STRIP-MCNC: NEGATIVE MG/DL
HCT VFR BLD AUTO: 42.7 % (ref 34–48)
HGB BLD-MCNC: 14 G/DL (ref 11.5–15.5)
HGB UR QL STRIP.AUTO: NEGATIVE
KETONES UR STRIP-MCNC: NEGATIVE MG/DL
LEUKOCYTE ESTERASE UR QL STRIP: NEGATIVE
MCH RBC QN AUTO: 26.7 PG (ref 26–35)
MCHC RBC AUTO-ENTMCNC: 32.8 G/DL (ref 32–34.5)
MCV RBC AUTO: 81.3 FL (ref 80–99.9)
MICROALBUMIN UR-MCNC: <12 MG/L (ref 0–19)
MICROALBUMIN/CREAT UR-RTO: NORMAL MCG/MG CREAT (ref 0–30)
NITRITE UR QL STRIP: NEGATIVE
PH UR STRIP: 6 [PH] (ref 5–8)
PHOSPHATE SERPL-MCNC: 3.1 MG/DL (ref 2.5–4.5)
PLATELET # BLD AUTO: 162 K/UL (ref 130–450)
PMV BLD AUTO: 11 FL (ref 7–12)
POTASSIUM SERPL-SCNC: 4.4 MMOL/L (ref 3.5–5)
PROT SERPL-MCNC: 7.6 G/DL (ref 6.4–8.3)
PROT UR STRIP-MCNC: NEGATIVE MG/DL
PTH-INTACT SERPL-MCNC: 53.4 PG/ML (ref 15–65)
RBC # BLD AUTO: 5.25 M/UL (ref 3.5–5.5)
RBC #/AREA URNS HPF: NORMAL /HPF
SODIUM SERPL-SCNC: 143 MMOL/L (ref 132–146)
SP GR UR STRIP: 1.02 (ref 1–1.03)
TOTAL PROTEIN, URINE: 7 MG/DL (ref 0–12)
UROBILINOGEN UR STRIP-ACNC: 0.2 EU/DL (ref 0–1)
WBC #/AREA URNS HPF: NORMAL /HPF
WBC OTHER # BLD: 6 K/UL (ref 4.5–11.5)

## 2025-03-26 PROCEDURE — 82306 VITAMIN D 25 HYDROXY: CPT

## 2025-03-26 PROCEDURE — 84100 ASSAY OF PHOSPHORUS: CPT

## 2025-03-26 PROCEDURE — 82570 ASSAY OF URINE CREATININE: CPT

## 2025-03-26 PROCEDURE — 84156 ASSAY OF PROTEIN URINE: CPT

## 2025-03-26 PROCEDURE — 36415 COLL VENOUS BLD VENIPUNCTURE: CPT

## 2025-03-26 PROCEDURE — 81001 URINALYSIS AUTO W/SCOPE: CPT

## 2025-03-26 PROCEDURE — 83970 ASSAY OF PARATHORMONE: CPT

## 2025-03-26 PROCEDURE — 80053 COMPREHEN METABOLIC PANEL: CPT

## 2025-03-26 PROCEDURE — 85027 COMPLETE CBC AUTOMATED: CPT

## 2025-03-26 PROCEDURE — 82043 UR ALBUMIN QUANTITATIVE: CPT

## 2025-05-17 DIAGNOSIS — Z96.89 S/P INSERTION OF SPINAL CORD STIMULATOR: ICD-10-CM

## 2025-05-17 DIAGNOSIS — M48.061 NEURAL FORAMINAL STENOSIS OF LUMBAR SPINE: Chronic | ICD-10-CM

## 2025-05-17 DIAGNOSIS — G47.00 INSOMNIA, UNSPECIFIED TYPE: ICD-10-CM

## 2025-05-17 DIAGNOSIS — G89.4 CHRONIC PAIN SYNDROME: Chronic | ICD-10-CM

## 2025-05-18 DIAGNOSIS — G89.4 CHRONIC PAIN SYNDROME: Chronic | ICD-10-CM

## 2025-05-18 DIAGNOSIS — M48.061 NEURAL FORAMINAL STENOSIS OF LUMBAR SPINE: Chronic | ICD-10-CM

## 2025-05-18 DIAGNOSIS — Z96.89 S/P INSERTION OF SPINAL CORD STIMULATOR: ICD-10-CM

## 2025-05-18 DIAGNOSIS — G47.00 INSOMNIA, UNSPECIFIED TYPE: ICD-10-CM

## 2025-05-19 RX ORDER — GABAPENTIN 600 MG/1
TABLET ORAL
Qty: 90 TABLET | Refills: 0 | OUTPATIENT
Start: 2025-05-19 | End: 2028-02-12

## 2025-05-19 RX ORDER — TRAZODONE HYDROCHLORIDE 150 MG/1
TABLET ORAL
Qty: 90 TABLET | Refills: 0 | OUTPATIENT
Start: 2025-05-19

## 2025-05-19 RX ORDER — GABAPENTIN 600 MG/1
TABLET ORAL
Qty: 90 TABLET | Refills: 0 | Status: SHIPPED | OUTPATIENT
Start: 2025-05-19 | End: 2028-02-12

## 2025-05-19 RX ORDER — TRAZODONE HYDROCHLORIDE 150 MG/1
TABLET ORAL
Qty: 90 TABLET | Refills: 0 | Status: SHIPPED | OUTPATIENT
Start: 2025-05-19

## 2025-05-19 NOTE — TELEPHONE ENCOUNTER
Name of Medication(s) Requested:  Requested Prescriptions     Pending Prescriptions Disp Refills    traZODone (DESYREL) 150 MG tablet [Pharmacy Med Name: traZODone HCl Oral Tablet 150 MG] 90 tablet 0     Sig: TAEK 1 TABLET BY MOUTH NIGHTLY    gabapentin (NEURONTIN) 600 MG tablet [Pharmacy Med Name: Gabapentin Oral Tablet 600 MG] 90 tablet 0     Sig: TAKE ONE TABLET BY MOUTH DAILY AS NEEDED (LEG PAIN)       Medication is on current medication list Yes    Dosage and directions were verified? Yes    Quantity verified: 90 day supply     Pharmacy Verified?  Yes    Last Appointment:  5/30/2024    Future appts:  Future Appointments   Date Time Provider Department Center   6/3/2025 10:15 AM Sebastian Mojica MD [place] [facility]   8/29/2025  9:45 AM Steffen Polk MD [facility]        (If no appt send self scheduling link. .REFILLAPPT)  Scheduling request sent?     [] Yes  [x] No    Does patient need updated?  [] Yes  [] No

## 2025-06-03 ENCOUNTER — OFFICE VISIT (OUTPATIENT)
Dept: FAMILY MEDICINE CLINIC | Age: 60
End: 2025-06-03
Payer: MEDICARE

## 2025-06-03 VITALS
HEIGHT: 60 IN | WEIGHT: 182 LBS | RESPIRATION RATE: 17 BRPM | BODY MASS INDEX: 35.73 KG/M2 | SYSTOLIC BLOOD PRESSURE: 122 MMHG | TEMPERATURE: 97.1 F | HEART RATE: 58 BPM | OXYGEN SATURATION: 97 % | DIASTOLIC BLOOD PRESSURE: 81 MMHG

## 2025-06-03 DIAGNOSIS — Z00.00 MEDICARE ANNUAL WELLNESS VISIT, SUBSEQUENT: Primary | ICD-10-CM

## 2025-06-03 PROCEDURE — 3017F COLORECTAL CA SCREEN DOC REV: CPT | Performed by: FAMILY MEDICINE

## 2025-06-03 PROCEDURE — G0439 PPPS, SUBSEQ VISIT: HCPCS | Performed by: FAMILY MEDICINE

## 2025-06-03 SDOH — ECONOMIC STABILITY: INCOME INSECURITY: IN THE LAST 12 MONTHS, WAS THERE A TIME WHEN YOU WERE NOT ABLE TO PAY THE MORTGAGE OR RENT ON TIME?: NO

## 2025-06-03 SDOH — HEALTH STABILITY: PHYSICAL HEALTH: ON AVERAGE, HOW MANY DAYS PER WEEK DO YOU ENGAGE IN MODERATE TO STRENUOUS EXERCISE (LIKE A BRISK WALK)?: 6 DAYS

## 2025-06-03 SDOH — ECONOMIC STABILITY: FOOD INSECURITY: WITHIN THE PAST 12 MONTHS, YOU WORRIED THAT YOUR FOOD WOULD RUN OUT BEFORE YOU GOT MONEY TO BUY MORE.: NEVER TRUE

## 2025-06-03 SDOH — ECONOMIC STABILITY: FOOD INSECURITY: WITHIN THE PAST 12 MONTHS, THE FOOD YOU BOUGHT JUST DIDN'T LAST AND YOU DIDN'T HAVE MONEY TO GET MORE.: NEVER TRUE

## 2025-06-03 SDOH — HEALTH STABILITY: PHYSICAL HEALTH: ON AVERAGE, HOW MANY MINUTES DO YOU ENGAGE IN EXERCISE AT THIS LEVEL?: 150+ MIN

## 2025-06-03 ASSESSMENT — LIFESTYLE VARIABLES
HOW MANY STANDARD DRINKS CONTAINING ALCOHOL DO YOU HAVE ON A TYPICAL DAY: 0
HOW OFTEN DO YOU HAVE A DRINK CONTAINING ALCOHOL: NEVER
HOW OFTEN DO YOU HAVE A DRINK CONTAINING ALCOHOL: 1
HOW OFTEN DO YOU HAVE SIX OR MORE DRINKS ON ONE OCCASION: 1
HOW MANY STANDARD DRINKS CONTAINING ALCOHOL DO YOU HAVE ON A TYPICAL DAY: PATIENT DOES NOT DRINK

## 2025-06-03 ASSESSMENT — PATIENT HEALTH QUESTIONNAIRE - PHQ9
2. FEELING DOWN, DEPRESSED OR HOPELESS: NOT AT ALL
SUM OF ALL RESPONSES TO PHQ QUESTIONS 1-9: 0
SUM OF ALL RESPONSES TO PHQ QUESTIONS 1-9: 0
1. LITTLE INTEREST OR PLEASURE IN DOING THINGS: NOT AT ALL
SUM OF ALL RESPONSES TO PHQ QUESTIONS 1-9: 0
SUM OF ALL RESPONSES TO PHQ QUESTIONS 1-9: 0

## 2025-06-03 NOTE — PROGRESS NOTES
Medicare Annual Wellness Visit    Leena Ye is here for Medicare AWV    Assessment & Plan   Medicare annual wellness visit, subsequent       Return for Medicare Annual Wellness Visit in 1 year.     Subjective     Declined PNA and shingles shots    Patient's complete Health Risk Assessment and screening values have been reviewed and are found in Flowsheets. The following problems were reviewed today and where indicated follow up appointments were made and/or referrals ordered.    Positive Risk Factor Screenings with Interventions:    Fall Risk:  Do you feel unsteady or are you worried about falling? : (Proxy-Rptd) no  2 or more falls in past year?: (!) (Proxy-Rptd) yes  Fall with injury in past year?: (Proxy-Rptd) no     Interventions:    Reviewed medications, home hazards, visual acuity, and co-morbidities that can increase risk for falls               Abnormal BMI (obese):  Body mass index is 35.54 kg/m². (!) Abnormal  Interventions:  Discussed weight loss options w/ pt. No comorbidities, declined further intervention, staying active.            Safety:  Do you have non-slip mats or non-slip surfaces or shower bars or grab bars in your shower or bathtub?: (!) (Proxy-Rptd) No  Interventions:  Advised to get non-slip surface in tub     Advanced Directives:  Do you have a Living Will?: (!) (Proxy-Rptd) No    Intervention:  has NO advanced directive - information provided                   Objective   Vitals:    06/03/25 1057   BP: 122/81   Pulse: 58   Resp: 17   Temp: 97.1 °F (36.2 °C)   TempSrc: Temporal   SpO2: 97%   Weight: 82.6 kg (182 lb)   Height: 1.524 m (5')      Body mass index is 35.54 kg/m².                  Allergies   Allergen Reactions    Penicillin G Hives and Nausea And Vomiting     Prior to Visit Medications    Medication Sig Taking? Authorizing Provider   traZODone (DESYREL) 150 MG tablet TAEK 1 TABLET BY MOUTH NIGHTLY Yes Sebastian Mojica MD   gabapentin (NEURONTIN) 600 MG tablet TAKE

## 2025-06-03 NOTE — PATIENT INSTRUCTIONS
doctor your wishes about life support and other treatment. The form is also called a declaration.  Medical power of .  This form lets you name a person to make treatment decisions for you when you can't speak for yourself. This person is called a health care agent (health care proxy, health care surrogate). The form is also called a durable power of  for health care.  If you do not have an advance directive, decisions about your medical care may be made by a family member, or by a doctor or a  who doesn't know you.  It may help to think of an advance directive as a gift to the people who care for you. If you have one, they won't have to make tough decisions by themselves.  For more information, including forms for your state, see the [employer] website (www.caringinfo.org/planning/advance-directives/).  Follow-up care is a key part of your treatment and safety. Be sure to make and go to all appointments, and call your doctor if you are having problems. It's also a good idea to know your test results and keep a list of the medicines you take.  What should you include in an advance directive?  Many states have a unique advance directive form. (It may ask you to address specific issues.) Or you might use a universal form that's approved by many states.  If your form doesn't tell you what to address, it may be hard to know what to include in your advance directive. Use the questions below to help you get started.  Who do you want to make decisions about your medical care if you are not able to?  What life-support measures do you want if you have a serious illness that gets worse over time or can't be cured?  What are you most afraid of that might happen? (Maybe you're afraid of having pain, losing your independence, or being kept alive by machines.)  Where would you prefer to die? (Your home? A hospital? A nursing home?)  Do you want to donate your organs when you die?  Do you want certain Caodaism

## 2025-08-15 DIAGNOSIS — M48.061 NEURAL FORAMINAL STENOSIS OF LUMBAR SPINE: Chronic | ICD-10-CM

## 2025-08-15 DIAGNOSIS — G89.4 CHRONIC PAIN SYNDROME: Chronic | ICD-10-CM

## 2025-08-15 DIAGNOSIS — G47.00 INSOMNIA, UNSPECIFIED TYPE: ICD-10-CM

## 2025-08-15 DIAGNOSIS — Z96.89 S/P INSERTION OF SPINAL CORD STIMULATOR: ICD-10-CM

## 2025-08-15 RX ORDER — GABAPENTIN 600 MG/1
TABLET ORAL
Qty: 90 TABLET | Refills: 0 | Status: SHIPPED | OUTPATIENT
Start: 2025-08-15 | End: 2028-05-10

## 2025-08-15 RX ORDER — TRAZODONE HYDROCHLORIDE 150 MG/1
TABLET ORAL
Qty: 90 TABLET | Refills: 0 | Status: SHIPPED | OUTPATIENT
Start: 2025-08-15

## 2025-08-22 ENCOUNTER — OFFICE VISIT (OUTPATIENT)
Dept: FAMILY MEDICINE CLINIC | Age: 60
End: 2025-08-22
Payer: MEDICARE

## 2025-08-22 VITALS
TEMPERATURE: 97.5 F | SYSTOLIC BLOOD PRESSURE: 106 MMHG | HEART RATE: 56 BPM | DIASTOLIC BLOOD PRESSURE: 72 MMHG | BODY MASS INDEX: 35.94 KG/M2 | WEIGHT: 184 LBS

## 2025-08-22 DIAGNOSIS — Z01.818 PREOP EXAMINATION: Primary | ICD-10-CM

## 2025-08-22 PROCEDURE — 99213 OFFICE O/P EST LOW 20 MIN: CPT | Performed by: FAMILY MEDICINE

## 2025-08-29 ENCOUNTER — OFFICE VISIT (OUTPATIENT)
Age: 60
End: 2025-08-29
Payer: MEDICARE

## 2025-08-29 VITALS
DIASTOLIC BLOOD PRESSURE: 70 MMHG | HEIGHT: 60 IN | BODY MASS INDEX: 36.12 KG/M2 | OXYGEN SATURATION: 97 % | TEMPERATURE: 96.9 F | HEART RATE: 76 BPM | SYSTOLIC BLOOD PRESSURE: 110 MMHG | WEIGHT: 184 LBS | RESPIRATION RATE: 16 BRPM

## 2025-08-29 DIAGNOSIS — M51.369 DEGENERATION OF INTERVERTEBRAL DISC OF LUMBAR REGION, UNSPECIFIED WHETHER PAIN PRESENT: ICD-10-CM

## 2025-08-29 DIAGNOSIS — M54.16 LUMBAR RADICULAR PAIN: ICD-10-CM

## 2025-08-29 DIAGNOSIS — Z96.89 S/P INSERTION OF SPINAL CORD STIMULATOR: ICD-10-CM

## 2025-08-29 DIAGNOSIS — M96.1 POSTLAMINECTOMY SYNDROME, LUMBAR: Primary | ICD-10-CM

## 2025-08-29 PROCEDURE — 99213 OFFICE O/P EST LOW 20 MIN: CPT | Performed by: ANESTHESIOLOGY

## 2025-08-29 RX ORDER — HYDROCODONE BITARTRATE AND ACETAMINOPHEN 5; 325 MG/1; MG/1
TABLET ORAL
COMMUNITY
Start: 2025-08-26

## (undated) DEVICE — GAUZE,SPONGE,4"X4",16PLY,XRAY,STRL,LF: Brand: MEDLINE

## (undated) DEVICE — PADDING,UNDERCAST,COTTON, 3X4YD STERILE: Brand: MEDLINE

## (undated) DEVICE — Z CONVERTED USE 2275207 CLOTH PREP W7.5XL7.5IN 2% CHG SKIN ALC AND RNS FREE

## (undated) DEVICE — TUBING, SUCTION, 1/4" X 10', STRAIGHT: Brand: MEDLINE

## (undated) DEVICE — GLOVE ORANGE PI 7   MSG9070

## (undated) DEVICE — BASIC PACK: Brand: CONVERTORS

## (undated) DEVICE — SPEAR SURG TRIANG SHP HNDL PCH WECK-CEL

## (undated) DEVICE — BLADE,STAINLESS-STEEL,15,STRL,DISPOSABLE: Brand: MEDLINE

## (undated) DEVICE — HANDLE CVR PATENTED RETENTION DISC STRL LIGHT SHLD

## (undated) DEVICE — NEEDLE, QUINCKE, 22GX5": Brand: MEDLINE

## (undated) DEVICE — COUNTER NDL 10 COUNT HLD 20 FOAM BLK SGL MAG

## (undated) DEVICE — Device

## (undated) DEVICE — NEEDLE SUT PASS FOR ROT CUF LABRAL REP MULTFI SCORPION

## (undated) DEVICE — 22GX5" WHITACRE SPINAL NEEDLE: Brand: MEDLINE

## (undated) DEVICE — SPONGE LAP W18XL18IN WHT COT 4 PLY FLD STRUNG RADPQ DISP ST

## (undated) DEVICE — KIT PATIENT TRIAL ALPHA

## (undated) DEVICE — TOWEL,OR,DSP,ST,BLUE,STD,6/PK,12PK/CS: Brand: MEDLINE

## (undated) DEVICE — SYRINGE, LUER LOCK, 10ML: Brand: MEDLINE

## (undated) DEVICE — 3M™ IOBAN™ 2 ANTIMICROBIAL INCISE DRAPE 6640EZ: Brand: IOBAN™ 2

## (undated) DEVICE — STANDARD HYPODERMIC NEEDLE,POLYPROPYLENE HUB: Brand: MONOJECT

## (undated) DEVICE — BINDER ABD UNISX 9IN 62IN L AND XL UNIV

## (undated) DEVICE — ELECTRODE PT RET AD L9FT HI MOIST COND ADH HYDRGEL CORDED

## (undated) DEVICE — BLADE CLIPPER GEN PURP NS

## (undated) DEVICE — VAGINAL PREP TRAY: Brand: MEDLINE INDUSTRIES, INC.

## (undated) DEVICE — BLADE,STAINLESS-STEEL,10,STRL,DISPOSABLE: Brand: MEDLINE

## (undated) DEVICE — 1010 S-DRAPE TOWEL DRAPE 10/BX: Brand: STERI-DRAPE™

## (undated) DEVICE — GLOVE SURG SZ 65 L12IN FNGR THK79MIL GRN LTX FREE

## (undated) DEVICE — NEEDLE HYPO 18GA L1.5IN PNK POLYPR HUB S STL THN WALL FILL

## (undated) DEVICE — LEAD HAND

## (undated) DEVICE — COVER,LIGHT HANDLE,FLX,1/PK: Brand: MEDLINE INDUSTRIES, INC.

## (undated) DEVICE — MEDI-VAC NON-CONDUCTIVE SUCTION TUBING: Brand: CARDINAL HEALTH

## (undated) DEVICE — 6 ML SYRINGE LUER-LOCK TIP: Brand: MONOJECT

## (undated) DEVICE — 1530S-1, 1 IN X 1.5 YD (2,5 CM X 1,37 M), UNPACKAGED ROLLS, 100 RL/CARTON, 5 CARTONS/CASE, 500 RL/CA: Brand: 3M™ MICROPORE™

## (undated) DEVICE — 5-IN-1 BARBED CONNECTOR POLYPROPYLENE 3/16 - 9/16 IN. (5 - 14.3 MM): Brand: ARGYLE

## (undated) DEVICE — CHARGER 2500 KIT: Brand: OMNIA

## (undated) DEVICE — NEPTUNE E-SEP SMOKE EVACUATION PENCIL, COATED, 70MM BLADE, PUSH BUTTON SWITCH: Brand: NEPTUNE E-SEP

## (undated) DEVICE — TRAY SET HAND REUSABLE

## (undated) DEVICE — GAUZE,SPONGE,4"X4",12PLY,STERILE,LF,2'S: Brand: MEDLINE

## (undated) DEVICE — NEEDLE HYPO 25GA L1.5IN BLU POLYPR HUB S STL REG BVL STR

## (undated) DEVICE — 1LYRTR 16FR10ML100%SIL UMS SNP: Brand: MEDLINE INDUSTRIES, INC.

## (undated) DEVICE — LAPAROTOMY PACK WITH POLYREINFORCED GOWNS: Brand: CONVERTORS

## (undated) DEVICE — NON-DEHP CATHETER EXTENSION SET, MALE LUER LOCK ADAPTER

## (undated) DEVICE — CART DISP LID TRANSPOSAL

## (undated) DEVICE — KENDALL SCD EXPRESS SLEEVES, KNEE LENGTH, MEDIUM: Brand: KENDALL SCD

## (undated) DEVICE — GARMENT,MEDLINE,DVT,INT,CALF,MED, GEN2: Brand: MEDLINE

## (undated) DEVICE — 1810 FOAM BLOCK NEEDLE COUNTER: Brand: DEVON

## (undated) DEVICE — CRADLE ARM W8.75XH12.5XL16IN FOAM SUPP ELEVATION VENT

## (undated) DEVICE — INTENDED FOR TISSUE SEPARATION, AND OTHER PROCEDURES THAT REQUIRE A SHARP SURGICAL BLADE TO PUNCTURE OR CUT.: Brand: BARD-PARKER ® STAINLESS STEEL BLADES

## (undated) DEVICE — PEN: MARKING STD 100/CS: Brand: MEDICAL ACTION INDUSTRIES

## (undated) DEVICE — 35CM LONG TUNNELING TOOL

## (undated) DEVICE — DRESSING FOAM W3.5XL6IN POSTOP STRP GENTLE OPTIFOAM AG

## (undated) DEVICE — SUTURE PERMA-HAND SZ 2-0 L30IN NONABSORBABLE BLK L26MM SH K833H

## (undated) DEVICE — WRENCH SURG L76CM SPNL CRD HEX STIM SYS SURG EQUIP PRECIS

## (undated) DEVICE — CANNULA ARTHSCP L4CM DIA8MM PASSPRT BTTN

## (undated) DEVICE — TUBING, SUCTION, 3/16" X 10', STRAIGHT: Brand: MEDLINE

## (undated) DEVICE — DRAPE SHEET, X-LARGE: Brand: CONVERTORS

## (undated) DEVICE — GLOVE ORANGE PI 8 1/2   MSG9085

## (undated) DEVICE — BLADE SHV L13CM DIA4MM DBL CUT COOLCUT

## (undated) DEVICE — CABLE SURG L60CM EXTN NEUROMODULATION PRECIS SPECTR

## (undated) DEVICE — CABLE NEUROSTIM EXTN 1X16 L213CM OR PRECIS SPECTR

## (undated) DEVICE — TOWEL OR BLUEE 16X26IN ST 8 PACK ORB08 16X26ORTWL

## (undated) DEVICE — NDL CNTR 40CT FM MAG: Brand: MEDLINE INDUSTRIES, INC.

## (undated) DEVICE — SEALER ENDOSCP NANO COAT OPN DIV CRV L JAW LIGASURE IMPACT

## (undated) DEVICE — DEVICE SEAL BLNT TIP NANO COAT HND AND FT ACT LIGASURE

## (undated) DEVICE — GOWN SURG XL LNG LEN SPUNBOND REINF VELC TIE LEV 4 IMPERV

## (undated) DEVICE — SUTURE BAG: Brand: DEVON

## (undated) DEVICE — 3M™ STERI-DRAPE™ U-DRAPE, LONG 1019: Brand: STERI-DRAPE™

## (undated) DEVICE — 3M™ MEDIPORE™ SOFT CLOTH TAPE, 4 INCH X 10 YARDS, 12 ROLLS/CASE, 2964: Brand: 3M™ MEDIPORE™

## (undated) DEVICE — SUTURE ETHLN SZ 3-0 L18IN NONABSORBABLE BLK L24MM PS-1 3/8 1663G

## (undated) DEVICE — SUTURE PROL SZ 3-0 L18IN NONABSORBABLE BLU L19MM PS-2 3/8 8687H

## (undated) DEVICE — SOLUTION IRRIG 1000ML STRL H2O USP PLAS POUR BTL

## (undated) DEVICE — GARMENT COMPR STD FOR 17IN CALF UNIF THER FLOTRN

## (undated) DEVICE — DRESSING GZ XRFRM 4X4(25/BX 6BX/CS)

## (undated) DEVICE — PROBE ABLAT 90DEG ASPIR MULTIPORT BPLR RF 1 PC ELECTRD ERGO

## (undated) DEVICE — PADDING,UNDERCAST,COTTON, 4"X4YD STERILE: Brand: MEDLINE

## (undated) DEVICE — TAPE SURG W4INXL11YD 2IN PERF LINERLESS NONWOVEN MEDFIX EZ

## (undated) DEVICE — SURGICAL PROCEDURE PACK HND

## (undated) DEVICE — APPLICATOR MEDICATED 10.5 CC SOLUTION HI LT ORNG CHLORAPREP

## (undated) DEVICE — GAUZE,SPONGE,4"X4",16PLY,STRL,LF,10/TRAY: Brand: MEDLINE

## (undated) DEVICE — SOLUTION IV IRRIG POUR BRL 0.9% SODIUM CHL 2F7124

## (undated) DEVICE — DOUBLE BASIN SET: Brand: MEDLINE INDUSTRIES, INC.

## (undated) DEVICE — WIPES SKIN CLOTH READYPREP 9 X 10.5 IN 2% CHLORHEX GLUCONATE CHG PREOP

## (undated) DEVICE — SOLUTION IRRIG 1000ML 0.9% SOD CHL USP POUR PLAS BTL

## (undated) DEVICE — GOWN SURG XL SMS FAB NONREINFORCED RAGLAN SLV HK LOOP CLSR

## (undated) DEVICE — DRAPE SURG W88XL116IN SMS BODY SPL ORTH N FEN REINF FLD PCH

## (undated) DEVICE — CHLORAPREP 26ML ORANGE

## (undated) DEVICE — 12 ML SYRINGE,LUER-LOCK TIP: Brand: MONOJECT

## (undated) DEVICE — SYRINGE MED 10ML LUERLOCK TIP W/O SFTY DISP

## (undated) DEVICE — SUTURE VCRL SZ 2-0 L27IN ABSRB UD L26MM SH 1/2 CIR J417H

## (undated) DEVICE — SHEET,DRAPE,40X58,STERILE: Brand: MEDLINE

## (undated) DEVICE — BASIC DOUBLE BASIN 2-LF: Brand: MEDLINE INDUSTRIES, INC.

## (undated) DEVICE — 3 ML SYRINGE LUER-LOCK TIP: Brand: MONOJECT

## (undated) DEVICE — SUTURE ETHLN SZ 2-0 L18IN NONABSORBABLE BLK L26MM FS 3/8 664G

## (undated) DEVICE — 4-PORT MANIFOLD: Brand: NEPTUNE 2

## (undated) DEVICE — PACK PROC 3IN1 W/ L12FT DIA0.25IN REINF SUCT TBNG W50XL901IN

## (undated) DEVICE — SUTURE SUTTAPE FIBERLINK 1.3MM WHT BLU CLS LOOP AR7535

## (undated) DEVICE — SUTURE MCRYL SZ 4-0 L18IN ABSRB UD L19MM PS-2 3/8 CIR PRIM Y496G

## (undated) DEVICE — GLOVE ORANGE PI 7 1/2   MSG9075

## (undated) DEVICE — TUBING PMP L16FT MAIN DISP FOR AR-6400 AR-6475

## (undated) DEVICE — MARKER,SKIN,WI/RULER AND LABELS: Brand: MEDLINE

## (undated) DEVICE — GLOVE SURG SZ 6 THK91MIL LTX FREE SYN POLYISOPRENE ANTI

## (undated) DEVICE — BANDAGE ADH W1XL3IN NAT FAB WVN FLX DURABLE N ADH PD SEAL

## (undated) DEVICE — YANKAUER,POOLE TIP,STERILE,50/CS: Brand: MEDLINE

## (undated) DEVICE — CANNULA ARTHSCP L3CM ID8MM DBL DAM 1 PC MOLD LO PROF FLNG

## (undated) DEVICE — DRAPE,SHOULDER,ORTHOMAX,W/POUCH,5/CS: Brand: MEDLINE

## (undated) DEVICE — GOWN,SIRUS,FABRNF,XL,20/CS: Brand: MEDLINE

## (undated) DEVICE — NEEDLE SPNL L3.5IN PNK HUB S STL REG WALL FIT STYL W/ QNCKE

## (undated) DEVICE — SUTURE FIBERTAPE FIBERWIRE SZ 2-0 30IN NONABSORB BLU AR72377

## (undated) DEVICE — DRAPE ARTC ARM W36XL28IN BND BG RUBBERBAND

## (undated) DEVICE — PAD,SANITARY,11 IN,MAXI,W/WINGS,N-STRL: Brand: MEDLINE

## (undated) DEVICE — SOLUTION IRRIG 1000ML 09% SOD CHL USP PIC PLAS CONTAINER

## (undated) DEVICE — 3M™ RED DOT™ MONITORING ELECTRODE WITH FOAM TAPE AND STICKY GEL 2560, 50/BAG, 20/CASE, 72/PLT: Brand: RED DOT™

## (undated) DEVICE — BUR SHV L13CM DIA5.5MM 8 FLUT OVL CLEARCUT COOLCUT

## (undated) DEVICE — CLOTH SURG PREP PREOPERATIVE CHLORHEXIDINE GLUC 2% READYPREP

## (undated) DEVICE — SOLUTION SURG PREP ANTIMICROBIAL 4 OZ SKIN WND EXIDINE

## (undated) DEVICE — SLEEVE TRAC SPANDEX LAT W/ 4IN COBAN SUPERFICIAL RAD NRV PD

## (undated) DEVICE — GLOVE SURG SZ 8 L12IN FNGR THK94MIL STD WHT LTX FREE

## (undated) DEVICE — SPONGE GZ W4XL4IN COT RADPQ HIGHLY ABSRB

## (undated) DEVICE — GOWN,SIRUS,POLYRNF,BRTHSLV,XLN/XXL,18/CS: Brand: MEDLINE

## (undated) DEVICE — PADDING UNDERCAST W4INXL4YD COT FBR LO LINTING WYTEX

## (undated) DEVICE — KIT CHARGING CHRG BASE STN PWR SUPL CHRG BELT PRECIS SPECTR

## (undated) DEVICE — NEEDLE NEUROSTIMULATOR 11CM SPNL CRD ENTRADA

## (undated) DEVICE — SPONGE,LAP,4"X18",XR,ST,5/PK,40PK/CS: Brand: MEDLINE INDUSTRIES, INC.

## (undated) DEVICE — GOWN,SIRUS,NONRNF,SETINSLV,XL,20/CS: Brand: MEDLINE